# Patient Record
Sex: FEMALE | Race: WHITE | Employment: OTHER | URBAN - METROPOLITAN AREA
[De-identification: names, ages, dates, MRNs, and addresses within clinical notes are randomized per-mention and may not be internally consistent; named-entity substitution may affect disease eponyms.]

---

## 2017-03-29 ENCOUNTER — OP HISTORICAL/CONVERTED ENCOUNTER (OUTPATIENT)
Dept: OTHER | Age: 72
End: 2017-03-29

## 2017-03-30 ENCOUNTER — OP HISTORICAL/CONVERTED ENCOUNTER (OUTPATIENT)
Dept: OTHER | Age: 72
End: 2017-03-30

## 2017-04-12 ENCOUNTER — OP HISTORICAL/CONVERTED ENCOUNTER (OUTPATIENT)
Dept: OTHER | Age: 72
End: 2017-04-12

## 2017-05-02 ENCOUNTER — OP HISTORICAL/CONVERTED ENCOUNTER (OUTPATIENT)
Dept: OTHER | Age: 72
End: 2017-05-02

## 2017-05-12 ENCOUNTER — OP HISTORICAL/CONVERTED ENCOUNTER (OUTPATIENT)
Dept: OTHER | Age: 72
End: 2017-05-12

## 2017-06-05 ENCOUNTER — OP HISTORICAL/CONVERTED ENCOUNTER (OUTPATIENT)
Dept: OTHER | Age: 72
End: 2017-06-05

## 2017-06-06 ENCOUNTER — OP HISTORICAL/CONVERTED ENCOUNTER (OUTPATIENT)
Dept: OTHER | Age: 72
End: 2017-06-06

## 2017-08-24 ENCOUNTER — OP HISTORICAL/CONVERTED ENCOUNTER (OUTPATIENT)
Dept: OTHER | Age: 72
End: 2017-08-24

## 2019-07-11 ENCOUNTER — ED HISTORICAL/CONVERTED ENCOUNTER (OUTPATIENT)
Dept: OTHER | Age: 74
End: 2019-07-11

## 2019-10-15 ENCOUNTER — OP HISTORICAL/CONVERTED ENCOUNTER (OUTPATIENT)
Dept: OTHER | Age: 74
End: 2019-10-15

## 2020-07-17 ENCOUNTER — ED HISTORICAL/CONVERTED ENCOUNTER (OUTPATIENT)
Dept: OTHER | Age: 75
End: 2020-07-17

## 2021-05-09 ENCOUNTER — HOSPITAL ENCOUNTER (INPATIENT)
Age: 76
LOS: 3 days | Discharge: NURSING, REHAB OR LONG-TERM CARE | DRG: 871 | End: 2021-05-13
Attending: EMERGENCY MEDICINE | Admitting: FAMILY MEDICINE
Payer: MEDICARE

## 2021-05-09 ENCOUNTER — APPOINTMENT (OUTPATIENT)
Dept: GENERAL RADIOLOGY | Age: 76
DRG: 871 | End: 2021-05-09
Attending: EMERGENCY MEDICINE
Payer: MEDICARE

## 2021-05-09 ENCOUNTER — APPOINTMENT (OUTPATIENT)
Dept: CT IMAGING | Age: 76
DRG: 871 | End: 2021-05-09
Attending: EMERGENCY MEDICINE
Payer: MEDICARE

## 2021-05-09 DIAGNOSIS — S30.0XXA LUMBAR CONTUSION, INITIAL ENCOUNTER: ICD-10-CM

## 2021-05-09 DIAGNOSIS — S80.00XA CONTUSION OF KNEE, UNSPECIFIED LATERALITY, INITIAL ENCOUNTER: ICD-10-CM

## 2021-05-09 DIAGNOSIS — N39.0 URINARY TRACT INFECTION WITHOUT HEMATURIA, SITE UNSPECIFIED: ICD-10-CM

## 2021-05-09 DIAGNOSIS — R53.1 GENERALIZED WEAKNESS: ICD-10-CM

## 2021-05-09 DIAGNOSIS — A41.9 SEPTICEMIA (HCC): Primary | ICD-10-CM

## 2021-05-09 LAB
ALBUMIN SERPL-MCNC: 3.5 G/DL (ref 3.5–5)
ALBUMIN/GLOB SERPL: 1 {RATIO} (ref 1.1–2.2)
ALP SERPL-CCNC: 106 U/L (ref 45–117)
ALT SERPL-CCNC: 43 U/L (ref 12–78)
ANION GAP SERPL CALC-SCNC: 13 MMOL/L (ref 5–15)
APPEARANCE UR: CLEAR
AST SERPL W P-5'-P-CCNC: 52 U/L (ref 15–37)
BACTERIA URNS QL MICRO: ABNORMAL /HPF
BACTERIA URNS QL MICRO: ABNORMAL /HPF
BASOPHILS # BLD: 0 K/UL (ref 0–0.1)
BASOPHILS NFR BLD: 0 % (ref 0–1)
BILIRUB SERPL-MCNC: 1 MG/DL (ref 0.2–1)
BILIRUB UR QL: NEGATIVE
BUN SERPL-MCNC: 12 MG/DL (ref 6–20)
BUN/CREAT SERPL: 13 (ref 12–20)
CA-I BLD-MCNC: 9.6 MG/DL (ref 8.5–10.1)
CHLORIDE SERPL-SCNC: 112 MMOL/L (ref 97–108)
CK SERPL-CCNC: 1109 U/L (ref 26–192)
CO2 SERPL-SCNC: 17 MMOL/L (ref 21–32)
COLOR UR: ABNORMAL
CREAT SERPL-MCNC: 0.9 MG/DL (ref 0.55–1.02)
DIFFERENTIAL METHOD BLD: ABNORMAL
EOSINOPHIL # BLD: 0 K/UL (ref 0–0.4)
EOSINOPHIL NFR BLD: 0 % (ref 0–7)
ERYTHROCYTE [DISTWIDTH] IN BLOOD BY AUTOMATED COUNT: 13.2 % (ref 11.5–14.5)
GLOBULIN SER CALC-MCNC: 3.6 G/DL (ref 2–4)
GLUCOSE SERPL-MCNC: 114 MG/DL (ref 65–100)
GLUCOSE UR STRIP.AUTO-MCNC: NEGATIVE MG/DL
HCT VFR BLD AUTO: 38.9 % (ref 35–47)
HGB BLD-MCNC: 13.5 G/DL (ref 11.5–16)
HGB UR QL STRIP: ABNORMAL
IMM GRANULOCYTES # BLD AUTO: 0 K/UL (ref 0–0.04)
IMM GRANULOCYTES NFR BLD AUTO: 0 % (ref 0–0.5)
INR PPP: 1 (ref 0.9–1.1)
KETONES UR QL STRIP.AUTO: NEGATIVE MG/DL
LACTATE SERPL-SCNC: 5.6 MMOL/L (ref 0.4–2)
LEUKOCYTE ESTERASE UR QL STRIP.AUTO: NEGATIVE
LYMPHOCYTES # BLD: 0.9 K/UL (ref 0.8–3.5)
LYMPHOCYTES NFR BLD: 7 % (ref 12–49)
MAGNESIUM SERPL-MCNC: 1.4 MG/DL (ref 1.6–2.4)
MCH RBC QN AUTO: 32.2 PG (ref 26–34)
MCHC RBC AUTO-ENTMCNC: 34.7 G/DL (ref 30–36.5)
MCV RBC AUTO: 92.8 FL (ref 80–99)
MONOCYTES # BLD: 0.7 K/UL (ref 0–1)
MONOCYTES NFR BLD: 6 % (ref 5–13)
MUCOUS THREADS URNS QL MICRO: ABNORMAL /LPF
NEUTS SEG # BLD: 10.5 K/UL (ref 1.8–8)
NEUTS SEG NFR BLD: 87 % (ref 32–75)
NITRITE UR QL STRIP.AUTO: POSITIVE
NRBC # BLD: 0 K/UL (ref 0–0.01)
NRBC BLD-RTO: 0 PER 100 WBC
PH UR STRIP: 5 [PH] (ref 5–8)
PLATELET # BLD AUTO: 288 K/UL (ref 150–400)
PMV BLD AUTO: 9.7 FL (ref 8.9–12.9)
POTASSIUM SERPL-SCNC: 3.2 MMOL/L (ref 3.5–5.1)
PROT SERPL-MCNC: 7.1 G/DL (ref 6.4–8.2)
PROT UR STRIP-MCNC: NEGATIVE MG/DL
PROTHROMBIN TIME: 13.3 SEC (ref 11.9–14.7)
RBC # BLD AUTO: 4.19 M/UL (ref 3.8–5.2)
RBC #/AREA URNS HPF: ABNORMAL /HPF (ref 0–5)
RBC #/AREA URNS HPF: ABNORMAL /HPF (ref 0–5)
SODIUM SERPL-SCNC: 142 MMOL/L (ref 136–145)
SP GR UR REFRACTOMETRY: 1.02 (ref 1–1.03)
TROPONIN I SERPL-MCNC: <0.05 NG/ML
UROBILINOGEN UR QL STRIP.AUTO: 0.1 EU/DL (ref 0.1–1)
WBC # BLD AUTO: 12.2 K/UL (ref 3.6–11)
WBC URNS QL MICRO: ABNORMAL /HPF (ref 0–4)
WBC URNS QL MICRO: ABNORMAL /HPF (ref 0–4)

## 2021-05-09 PROCEDURE — 36415 COLL VENOUS BLD VENIPUNCTURE: CPT

## 2021-05-09 PROCEDURE — 72131 CT LUMBAR SPINE W/O DYE: CPT

## 2021-05-09 PROCEDURE — 81001 URINALYSIS AUTO W/SCOPE: CPT

## 2021-05-09 PROCEDURE — 74011000636 HC RX REV CODE- 636: Performed by: EMERGENCY MEDICINE

## 2021-05-09 PROCEDURE — 74011000258 HC RX REV CODE- 258: Performed by: EMERGENCY MEDICINE

## 2021-05-09 PROCEDURE — 82550 ASSAY OF CK (CPK): CPT

## 2021-05-09 PROCEDURE — 70450 CT HEAD/BRAIN W/O DYE: CPT

## 2021-05-09 PROCEDURE — 74011250637 HC RX REV CODE- 250/637: Performed by: EMERGENCY MEDICINE

## 2021-05-09 PROCEDURE — 96365 THER/PROPH/DIAG IV INF INIT: CPT

## 2021-05-09 PROCEDURE — 96361 HYDRATE IV INFUSION ADD-ON: CPT

## 2021-05-09 PROCEDURE — 85025 COMPLETE CBC W/AUTO DIFF WBC: CPT

## 2021-05-09 PROCEDURE — 80053 COMPREHEN METABOLIC PANEL: CPT

## 2021-05-09 PROCEDURE — 85610 PROTHROMBIN TIME: CPT

## 2021-05-09 PROCEDURE — 73562 X-RAY EXAM OF KNEE 3: CPT

## 2021-05-09 PROCEDURE — 93005 ELECTROCARDIOGRAM TRACING: CPT

## 2021-05-09 PROCEDURE — 84484 ASSAY OF TROPONIN QUANT: CPT

## 2021-05-09 PROCEDURE — 94760 N-INVAS EAR/PLS OXIMETRY 1: CPT

## 2021-05-09 PROCEDURE — 74011250636 HC RX REV CODE- 250/636: Performed by: EMERGENCY MEDICINE

## 2021-05-09 PROCEDURE — 51702 INSERT TEMP BLADDER CATH: CPT

## 2021-05-09 PROCEDURE — 70498 CT ANGIOGRAPHY NECK: CPT

## 2021-05-09 PROCEDURE — 83605 ASSAY OF LACTIC ACID: CPT

## 2021-05-09 PROCEDURE — 87040 BLOOD CULTURE FOR BACTERIA: CPT

## 2021-05-09 PROCEDURE — 71045 X-RAY EXAM CHEST 1 VIEW: CPT

## 2021-05-09 PROCEDURE — 83735 ASSAY OF MAGNESIUM: CPT

## 2021-05-09 PROCEDURE — 99285 EMERGENCY DEPT VISIT HI MDM: CPT

## 2021-05-09 RX ORDER — ASPIRIN 325 MG
325 TABLET ORAL
Status: COMPLETED | OUTPATIENT
Start: 2021-05-09 | End: 2021-05-09

## 2021-05-09 RX ADMIN — SODIUM CHLORIDE 1000 ML: 9 INJECTION, SOLUTION INTRAVENOUS at 22:11

## 2021-05-09 RX ADMIN — PIPERACILLIN AND TAZOBACTAM 3.38 G: 3; .375 INJECTION, POWDER, LYOPHILIZED, FOR SOLUTION INTRAVENOUS at 23:06

## 2021-05-09 RX ADMIN — ASPIRIN 325 MG ORAL TABLET 325 MG: 325 PILL ORAL at 22:58

## 2021-05-09 RX ADMIN — IOPAMIDOL 100 ML: 755 INJECTION, SOLUTION INTRAVENOUS at 22:42

## 2021-05-10 PROBLEM — N39.0 UTI (URINARY TRACT INFECTION): Status: ACTIVE | Noted: 2021-05-10

## 2021-05-10 LAB
ANION GAP SERPL CALC-SCNC: 7 MMOL/L (ref 5–15)
ATRIAL RATE: 394 BPM
BUN SERPL-MCNC: 9 MG/DL (ref 6–20)
BUN/CREAT SERPL: 12 (ref 12–20)
CA-I BLD-MCNC: 8.9 MG/DL (ref 8.5–10.1)
CALCULATED P AXIS, ECG09: 77 DEGREES
CALCULATED R AXIS, ECG10: -66 DEGREES
CALCULATED T AXIS, ECG11: -68 DEGREES
CHLORIDE SERPL-SCNC: 118 MMOL/L (ref 97–108)
CO2 SERPL-SCNC: 19 MMOL/L (ref 21–32)
CREAT SERPL-MCNC: 0.77 MG/DL (ref 0.55–1.02)
DIAGNOSIS, 93000: NORMAL
ERYTHROCYTE [DISTWIDTH] IN BLOOD BY AUTOMATED COUNT: 13.7 % (ref 11.5–14.5)
GLUCOSE SERPL-MCNC: 87 MG/DL (ref 65–100)
HCT VFR BLD AUTO: 36.3 % (ref 35–47)
HGB BLD-MCNC: 12.2 G/DL (ref 11.5–16)
LACTATE SERPL-SCNC: 2.2 MMOL/L (ref 0.4–2)
MAGNESIUM SERPL-MCNC: 1.8 MG/DL (ref 1.6–2.4)
MCH RBC QN AUTO: 31.9 PG (ref 26–34)
MCHC RBC AUTO-ENTMCNC: 33.6 G/DL (ref 30–36.5)
MCV RBC AUTO: 95 FL (ref 80–99)
NRBC # BLD: 0 K/UL (ref 0–0.01)
NRBC BLD-RTO: 0 PER 100 WBC
P-R INTERVAL, ECG05: 138 MS
PLATELET # BLD AUTO: 210 K/UL (ref 150–400)
PMV BLD AUTO: 10 FL (ref 8.9–12.9)
POTASSIUM SERPL-SCNC: 4 MMOL/L (ref 3.5–5.1)
Q-T INTERVAL, ECG07: 336 MS
QRS DURATION, ECG06: 68 MS
QTC CALCULATION (BEZET), ECG08: 458 MS
RBC # BLD AUTO: 3.82 M/UL (ref 3.8–5.2)
SODIUM SERPL-SCNC: 144 MMOL/L (ref 136–145)
TSH SERPL DL<=0.05 MIU/L-ACNC: 0.08 UIU/ML (ref 0.36–3.74)
VENTRICULAR RATE, ECG03: 112 BPM
WBC # BLD AUTO: 8 K/UL (ref 3.6–11)

## 2021-05-10 PROCEDURE — 83605 ASSAY OF LACTIC ACID: CPT

## 2021-05-10 PROCEDURE — 87186 SC STD MICRODIL/AGAR DIL: CPT

## 2021-05-10 PROCEDURE — 96375 TX/PRO/DX INJ NEW DRUG ADDON: CPT

## 2021-05-10 PROCEDURE — 97530 THERAPEUTIC ACTIVITIES: CPT

## 2021-05-10 PROCEDURE — 74011250637 HC RX REV CODE- 250/637: Performed by: FAMILY MEDICINE

## 2021-05-10 PROCEDURE — 80061 LIPID PANEL: CPT

## 2021-05-10 PROCEDURE — 74011250636 HC RX REV CODE- 250/636: Performed by: FAMILY MEDICINE

## 2021-05-10 PROCEDURE — 80048 BASIC METABOLIC PNL TOTAL CA: CPT

## 2021-05-10 PROCEDURE — 85027 COMPLETE CBC AUTOMATED: CPT

## 2021-05-10 PROCEDURE — 96366 THER/PROPH/DIAG IV INF ADDON: CPT

## 2021-05-10 PROCEDURE — 83735 ASSAY OF MAGNESIUM: CPT

## 2021-05-10 PROCEDURE — 97161 PT EVAL LOW COMPLEX 20 MIN: CPT

## 2021-05-10 PROCEDURE — 74011250637 HC RX REV CODE- 250/637: Performed by: PHYSICIAN ASSISTANT

## 2021-05-10 PROCEDURE — 65270000029 HC RM PRIVATE

## 2021-05-10 PROCEDURE — 84443 ASSAY THYROID STIM HORMONE: CPT

## 2021-05-10 PROCEDURE — 87086 URINE CULTURE/COLONY COUNT: CPT

## 2021-05-10 PROCEDURE — 74011250636 HC RX REV CODE- 250/636: Performed by: EMERGENCY MEDICINE

## 2021-05-10 PROCEDURE — 87077 CULTURE AEROBIC IDENTIFY: CPT

## 2021-05-10 RX ORDER — PANTOPRAZOLE SODIUM 40 MG/1
40 TABLET, DELAYED RELEASE ORAL
Status: DISCONTINUED | OUTPATIENT
Start: 2021-05-10 | End: 2021-05-13 | Stop reason: HOSPADM

## 2021-05-10 RX ORDER — MAGNESIUM SULFATE 1 G/100ML
1 INJECTION INTRAVENOUS
Status: COMPLETED | OUTPATIENT
Start: 2021-05-10 | End: 2021-05-10

## 2021-05-10 RX ORDER — TROSPIUM CHLORIDE 20 MG/1
20 TABLET, FILM COATED ORAL 2 TIMES DAILY
Status: DISCONTINUED | OUTPATIENT
Start: 2021-05-10 | End: 2021-05-13 | Stop reason: HOSPADM

## 2021-05-10 RX ORDER — PROMETHAZINE HYDROCHLORIDE 25 MG/1
12.5 TABLET ORAL
Status: DISCONTINUED | OUTPATIENT
Start: 2021-05-10 | End: 2021-05-13 | Stop reason: HOSPADM

## 2021-05-10 RX ORDER — ATORVASTATIN CALCIUM 40 MG/1
40 TABLET, FILM COATED ORAL
Status: DISCONTINUED | OUTPATIENT
Start: 2021-05-10 | End: 2021-05-13 | Stop reason: HOSPADM

## 2021-05-10 RX ORDER — LEVOTHYROXINE SODIUM 75 UG/1
75 TABLET ORAL DAILY
COMMUNITY
Start: 2021-04-14 | End: 2021-05-13

## 2021-05-10 RX ORDER — POLYETHYLENE GLYCOL 3350 17 G/17G
17 POWDER, FOR SOLUTION ORAL DAILY PRN
Status: DISCONTINUED | OUTPATIENT
Start: 2021-05-10 | End: 2021-05-13 | Stop reason: HOSPADM

## 2021-05-10 RX ORDER — SODIUM CHLORIDE 9 MG/ML
75 INJECTION, SOLUTION INTRAVENOUS CONTINUOUS
Status: DISPENSED | OUTPATIENT
Start: 2021-05-10 | End: 2021-05-10

## 2021-05-10 RX ORDER — ATORVASTATIN CALCIUM 40 MG/1
40 TABLET, FILM COATED ORAL
COMMUNITY
Start: 2021-05-03

## 2021-05-10 RX ORDER — TOLTERODINE TARTRATE 2 MG/1
2 CAPSULE, EXTENDED RELEASE ORAL DAILY
COMMUNITY
Start: 2021-04-14 | End: 2022-06-02

## 2021-05-10 RX ORDER — LOPERAMIDE HYDROCHLORIDE 2 MG/1
4 CAPSULE ORAL
Status: DISCONTINUED | OUTPATIENT
Start: 2021-05-10 | End: 2021-05-13 | Stop reason: HOSPADM

## 2021-05-10 RX ORDER — ACETAMINOPHEN 325 MG/1
650 TABLET ORAL
Status: DISCONTINUED | OUTPATIENT
Start: 2021-05-10 | End: 2021-05-13 | Stop reason: HOSPADM

## 2021-05-10 RX ORDER — POTASSIUM CHLORIDE 20 MEQ/1
20 TABLET, EXTENDED RELEASE ORAL 2 TIMES DAILY
Status: DISCONTINUED | OUTPATIENT
Start: 2021-05-10 | End: 2021-05-10

## 2021-05-10 RX ORDER — PROPRANOLOL HYDROCHLORIDE 20 MG/1
10 TABLET ORAL 2 TIMES DAILY
Status: DISCONTINUED | OUTPATIENT
Start: 2021-05-10 | End: 2021-05-13 | Stop reason: HOSPADM

## 2021-05-10 RX ORDER — PROPRANOLOL HYDROCHLORIDE 10 MG/1
20 TABLET ORAL 2 TIMES DAILY
COMMUNITY
Start: 2021-02-26 | End: 2022-06-06

## 2021-05-10 RX ORDER — ONDANSETRON 2 MG/ML
4 INJECTION INTRAMUSCULAR; INTRAVENOUS
Status: DISCONTINUED | OUTPATIENT
Start: 2021-05-10 | End: 2021-05-13 | Stop reason: HOSPADM

## 2021-05-10 RX ORDER — ACETAMINOPHEN 650 MG/1
650 SUPPOSITORY RECTAL
Status: DISCONTINUED | OUTPATIENT
Start: 2021-05-10 | End: 2021-05-13 | Stop reason: HOSPADM

## 2021-05-10 RX ORDER — POTASSIUM CHLORIDE 750 MG/1
40 TABLET, FILM COATED, EXTENDED RELEASE ORAL
Status: COMPLETED | OUTPATIENT
Start: 2021-05-10 | End: 2021-05-10

## 2021-05-10 RX ORDER — LEVOTHYROXINE SODIUM 75 UG/1
75 TABLET ORAL DAILY
Status: DISCONTINUED | OUTPATIENT
Start: 2021-05-10 | End: 2021-05-11

## 2021-05-10 RX ADMIN — TROSPIUM CHLORIDE 20 MG: 20 TABLET, FILM COATED ORAL at 12:39

## 2021-05-10 RX ADMIN — ACETAMINOPHEN 650 MG: 325 TABLET, FILM COATED ORAL at 16:22

## 2021-05-10 RX ADMIN — LEVOTHYROXINE SODIUM 75 MCG: 75 TABLET ORAL at 09:03

## 2021-05-10 RX ADMIN — ATORVASTATIN CALCIUM 40 MG: 40 TABLET, FILM COATED ORAL at 21:55

## 2021-05-10 RX ADMIN — PANTOPRAZOLE SODIUM 40 MG: 40 TABLET, DELAYED RELEASE ORAL at 09:03

## 2021-05-10 RX ADMIN — PROPRANOLOL HYDROCHLORIDE 10 MG: 20 TABLET ORAL at 09:03

## 2021-05-10 RX ADMIN — TROSPIUM CHLORIDE 20 MG: 20 TABLET, FILM COATED ORAL at 21:55

## 2021-05-10 RX ADMIN — POTASSIUM CHLORIDE 40 MEQ: 750 TABLET, FILM COATED, EXTENDED RELEASE ORAL at 00:39

## 2021-05-10 RX ADMIN — SODIUM CHLORIDE 75 ML/HR: 9 INJECTION, SOLUTION INTRAVENOUS at 00:37

## 2021-05-10 RX ADMIN — LOPERAMIDE HYDROCHLORIDE 4 MG: 2 CAPSULE ORAL at 16:22

## 2021-05-10 RX ADMIN — PROPRANOLOL HYDROCHLORIDE 10 MG: 20 TABLET ORAL at 21:55

## 2021-05-10 RX ADMIN — MAGNESIUM SULFATE HEPTAHYDRATE 1 G: 1 INJECTION, SOLUTION INTRAVENOUS at 00:03

## 2021-05-10 NOTE — ED PROVIDER NOTES
EMERGENCY DEPARTMENT HISTORY AND PHYSICAL EXAM      Date: 5/9/2021  Patient Name: Marge Conway      History of Presenting Illness     Chief Complaint   Patient presents with   [facility] Fall    Altered mental status       History Provided By: Patient and EMS    HPI: Marge Conway, 77 y.o. female with a past medical history significant Unknown at this time presents to the ED with cc of fall, possible stroke. This morning neighbor found patient on the floor. Patient refused to come to emergency department. Later on today patient was found by cousin on the floor and appears to be more confused and had slightly slurred speech. Cousin called ambulance ambulance brought the patient to emergency department. Upon arrival patient has a slight confusion. She complains of generalized weakness. She complains of bilateral knee pain. She complains of lower back pain. Patient denies chest pain or shortness of breath. Patient denies any other complaints at this time. There are no other complaints, changes, or physical findings at this time. PCP: No primary care provider on file. Past History     Past Medical History:  Past Medical History:   Diagnosis Date    HTN (hypertension)     Hypercholesterolemia     Hypothyroidism     Urinary incontinence        Past Surgical History:  History reviewed. No pertinent surgical history. Family History:  History reviewed. No pertinent family history. Social History:  Social History     Tobacco Use    Smoking status: Never Smoker    Smokeless tobacco: Never Used   Substance Use Topics    Alcohol use: Not Currently    Drug use: Not Currently       Allergies: Allergies   Allergen Reactions    Codeine Unknown (comments)         Review of Systems     Review of Systems   Constitutional: Positive for chills and fatigue. HENT: Negative. Eyes: Negative. Respiratory: Negative. Cardiovascular: Negative. Gastrointestinal: Negative.     Genitourinary: Negative. Musculoskeletal: Negative. Skin: Negative. Neurological: Positive for weakness. Psychiatric/Behavioral: Negative. All other systems reviewed and are negative. Physical Exam     Physical Exam  Vitals signs and nursing note reviewed. Constitutional:       General: She is not in acute distress. Appearance: Normal appearance. She is normal weight. She is not ill-appearing or diaphoretic. HENT:      Head: Normocephalic. Right Ear: External ear normal.      Left Ear: External ear normal.      Nose: Nose normal. No congestion or rhinorrhea. Mouth/Throat:      Pharynx: Oropharynx is clear. No oropharyngeal exudate or posterior oropharyngeal erythema. Eyes:      General: No scleral icterus. Right eye: No discharge. Left eye: No discharge. Extraocular Movements: Extraocular movements intact. Conjunctiva/sclera: Conjunctivae normal.      Pupils: Pupils are equal, round, and reactive to light. Neck:      Musculoskeletal: Normal range of motion and neck supple. No neck rigidity or muscular tenderness. Cardiovascular:      Rate and Rhythm: Normal rate and regular rhythm. Pulses: Normal pulses. Heart sounds: Normal heart sounds. No murmur. Pulmonary:      Effort: Pulmonary effort is normal. No respiratory distress. Breath sounds: Normal breath sounds. No stridor. No wheezing, rhonchi or rales. Chest:      Chest wall: No tenderness. Abdominal:      General: Abdomen is flat. Bowel sounds are normal. There is no distension. Palpations: Abdomen is soft. Tenderness: There is no abdominal tenderness. There is no right CVA tenderness, left CVA tenderness, guarding or rebound. Musculoskeletal:         General: Tenderness present. No swelling, deformity or signs of injury. Right lower leg: No edema. Left lower leg: No edema.       Comments: Positive tenderness and mild erythema noted on the anterior aspects of the both knees. Decreased range of motion of both knees due to the pain. Neurovascular intact. Positive tenderness and decreased range of motion of lower lumbar area. Neurovascular intact. No signs of erythema, cellulitis or abscess. Skin:     General: Skin is warm. Capillary Refill: Capillary refill takes less than 2 seconds. Coloration: Skin is not jaundiced or pale. Findings: No bruising, erythema, lesion or rash. Neurological:      General: No focal deficit present. Mental Status: She is alert. Mental status is at baseline. GCS: GCS eye subscore is 4. GCS verbal subscore is 5. GCS motor subscore is 6. Cranial Nerves: No cranial nerve deficit. Sensory: No sensory deficit. Motor: No weakness. Coordination: Coordination normal.      Comments: Positive generalized weakness. Positive mild confusion. Questionable slurred speech. Patient follows command. Patient moves all her extremities. However she says she is kind of weak in the lower extremities. Psychiatric:         Mood and Affect: Mood normal.         Behavior: Behavior normal.         Thought Content:  Thought content normal.         Judgment: Judgment normal.         Lab and Diagnostic Study Results     Labs -     Recent Results (from the past 12 hour(s))   URINALYSIS W/ RFLX MICROSCOPIC    Collection Time: 05/09/21  9:50 PM   Result Value Ref Range    Color Yellow/Straw      Appearance Clear Clear      Specific gravity 1.023 1.003 - 1.030      pH (UA) 5.0 5.0 - 8.0      Protein Negative Negative mg/dL    Glucose Negative Negative mg/dL    Ketone Negative Negative mg/dL    Bilirubin Negative Negative      Blood Moderate (A) Negative      Urobilinogen 0.1 0.1 - 1.0 EU/dL    Nitrites Positive (A) Negative      Leukocyte Esterase Negative Negative      WBC 5-10 0 - 4 /hpf    RBC 0-5 0 - 5 /hpf    Bacteria 3+ (A) Negative /hpf    Mucus Trace /lpf   URINE MICROSCOPIC    Collection Time: 05/09/21  9:50 PM Result Value Ref Range    WBC 5-10 0 - 4 /hpf    RBC 0-5 0 - 5 /hpf    Bacteria 3+ (A) Negative /hpf   CBC WITH AUTOMATED DIFF    Collection Time: 05/09/21 10:05 PM   Result Value Ref Range    WBC 12.2 (H) 3.6 - 11.0 K/uL    RBC 4.19 3.80 - 5.20 M/uL    HGB 13.5 11.5 - 16.0 g/dL    HCT 38.9 35.0 - 47.0 %    MCV 92.8 80.0 - 99.0 FL    MCH 32.2 26.0 - 34.0 PG    MCHC 34.7 30.0 - 36.5 g/dL    RDW 13.2 11.5 - 14.5 %    PLATELET 334 834 - 705 K/uL    MPV 9.7 8.9 - 12.9 FL    NRBC 0.0 0.0  WBC    ABSOLUTE NRBC 0.00 0.00 - 0.01 K/uL    NEUTROPHILS 87 (H) 32 - 75 %    LYMPHOCYTES 7 (L) 12 - 49 %    MONOCYTES 6 5 - 13 %    EOSINOPHILS 0 0 - 7 %    BASOPHILS 0 0 - 1 %    IMMATURE GRANULOCYTES 0 0 - 0.5 %    ABS. NEUTROPHILS 10.5 (H) 1.8 - 8.0 K/UL    ABS. LYMPHOCYTES 0.9 0.8 - 3.5 K/UL    ABS. MONOCYTES 0.7 0.0 - 1.0 K/UL    ABS. EOSINOPHILS 0.0 0.0 - 0.4 K/UL    ABS. BASOPHILS 0.0 0.0 - 0.1 K/UL    ABS. IMM. GRANS. 0.0 0.00 - 0.04 K/UL    DF AUTOMATED     PROTHROMBIN TIME + INR    Collection Time: 05/09/21 10:05 PM   Result Value Ref Range    Prothrombin time 13.3 11.9 - 14.7 sec    INR 1.0 0.9 - 1.1     METABOLIC PANEL, COMPREHENSIVE    Collection Time: 05/09/21 10:05 PM   Result Value Ref Range    Sodium 142 136 - 145 mmol/L    Potassium 3.2 (L) 3.5 - 5.1 mmol/L    Chloride 112 (H) 97 - 108 mmol/L    CO2 17 (L) 21 - 32 mmol/L    Anion gap 13 5 - 15 mmol/L    Glucose 114 (H) 65 - 100 mg/dL    BUN 12 6 - 20 mg/dL    Creatinine 0.90 0.55 - 1.02 mg/dL    BUN/Creatinine ratio 13 12 - 20      GFR est AA >60 >60 ml/min/1.73m2    GFR est non-AA >60 >60 ml/min/1.73m2    Calcium 9.6 8.5 - 10.1 mg/dL    Bilirubin, total 1.0 0.2 - 1.0 mg/dL    AST (SGOT) 52 (H) 15 - 37 U/L    ALT (SGPT) 43 12 - 78 U/L    Alk.  phosphatase 106 45 - 117 U/L    Protein, total 7.1 6.4 - 8.2 g/dL    Albumin 3.5 3.5 - 5.0 g/dL    Globulin 3.6 2.0 - 4.0 g/dL    A-G Ratio 1.0 (L) 1.1 - 2.2     TROPONIN I    Collection Time: 05/09/21 10:05 PM   Result Value Ref Range    Troponin-I, Qt. <0.05 <0.05 ng/mL   MAGNESIUM    Collection Time: 05/09/21 10:05 PM   Result Value Ref Range    Magnesium 1.4 (L) 1.6 - 2.4 mg/dL   LACTIC ACID    Collection Time: 05/09/21 10:05 PM   Result Value Ref Range    Lactic acid 5.6 (HH) 0.4 - 2.0 mmol/L   CK    Collection Time: 05/09/21 10:05 PM   Result Value Ref Range    CK 1,109 (H) 26 - 192 U/L       Radiologic Studies -   [unfilled]  CT Results  (Last 48 hours)               05/09/21 2241  CT SPINE LUMB WO CONT Final result    Impression:  Mild multilevel degenerative disc changes, chronic superior endplate   depressions of T12 and L4, but no acute findings related to the patient's   current trauma. Narrative:  HISTORY:  fall. pain   Dose reduction technique: All CT scans at this facility are performed using dose reduction optimization   technique as appropriate on the exam including the following: Automated exposure   control, adjustment of the MA and/or KV according to patient size of use of   iterative reconstructive technique. .       TECHNIQUE: Noncontrast CT of the lumbar spine with multiplanar reconstructions. COMPARISON: 7/11/2019 abdominopelvic CT   LIMITATIONS: None       FRACTURES: No acute fractures. Superior endplate depression at R23 and L4 are   unchanged from the patient's prior exam.   ALIGNMENT: Normal   VERTEBRAL BODIES: Some mild anterior osteophyte formation. Superior endplate   depressions at T12 and L4 again noted. DISC SPACES: Mild posterior disc bulge at L2-3. Posterior disc/osteophyte   complex at L3-4 and with mild bilateral neural foraminal narrowing. POSTERIOR ELEMENTS: Mild multilevel facet arthropathic changes. SPINAL CANAL: Normal   SACROILIAC JOINT: Visualized portions unremarkable   PARASPINAL SOFT TISSUES: Normal       OTHER: None           05/09/21 2241  CTA CODE NEURO HEAD AND NECK W CONT Final result    Impression:  Normal CT angiogram of the neck.             HISTORY: weakness   Dose reduction technique: All CT scans at this facility are performed using dose reduction optimization   technique as appropriate on the exam including the following: Automated exposure   control, adjustment of the MA and/or KV according to patient size and/or use of   iterative reconstructive technique. TECHNIQUE: CTA CODE NEURO HEAD W CONTCT angiogram of the brain was performed and   maximum intensity projection images (MIPS) were generated. 100 cc Isovue-370 injected. All stenosis measurements performed using NASCET   criteria. COMPARISON: Brain CT performed as previous to this examination   LIMITATIONS: None           Noncontrast brain CT demonstrates no evidence of acute intracranial hemorrhage,   mass effect, or midline shift. No acute extra-axial abnormalities. Ventricles   are patent and clear. Postcontrast brain CT angiogram findings:   CAROTID ARTERIES: Normal   ANTERIOR CEREBRAL ARTERIES: Normal   MIDDLE CEREBRAL ARTERIES: Normal   POSTERIOR CEREBRAL ARTERIES: Normal   BASILAR ARTERY: Normal   VERTEBRAL ARTERIES: Normal   VENOUS STRUCTURES: Visualized venous structures unremarkable. OTHER: None       IMPRESSION: Normal CT angiogram of the brain. Narrative:  HISTORY:  weakness   Dose reduction technique: All CT scans at this facility are performed using dose reduction optimization   technique as appropriate on the exam including the following: Automated exposure   control, adjustment of the MA and/or KV according to patient size of use of   iterative reconstructive technique. .       TECHNIQUE: CTA CODE NEURO NECK W CONT   CT angiogram of the neck performed and   maximum intensity projection images (MIPS) generated. 100 cc Isovue-370 injected.                              All stenosis measurements performed using NASCET   criteria   COMPARISON: None   LIMITATIONS: None       AORTIC ARCH: Visualized aortic arch normal   CAROTID ARTERIES: Normal   VERTEBRAL ARTERIES: Normal       OTHER ARTERIES: Other visualized arteries appear unremarkable   VENOUS STRUCTURES: Visualized venous structures appear unremarkable   OSSEOUS STRUCTURES and SOFT TISSUES: Normal   OTHER: None           05/09/21 2147  CT CODE NEURO HEAD WO CONTRAST Final result    Impression:      1. No acute intracranial abnormality. 2. Atrophy with periventricular white matter hypoattenuation, nonspecific, but   most commonly seen in the setting of chronic small vessel ischemic disease. I called these results to Dr. Laila Meyers at 10:00 PM on 5/9/2021       Narrative:  EXAMINATION: Computed tomography of the head without contrast.       HISTORY: Weakness       TECHNIQUE: Routine axial images were obtained through the brain without the use   of IV contrast. Sagittal and coronal reformatted images were performed at the CT   console. Dose Reduction: All CT scans at this facility are performed using dose reduction   optimization techniques as appropriate to a performed exam including the   following: Automated exposure control, adjustments of the mA and/or kV according   to patient size, or use of iterative reconstruction technique. COMPARISON: CT angiography of the head dated 8/12/2011       FINDINGS:    There is no acute intracranial hemorrhage. There is moderate periventricular and   deep white matter hypoattenuation. No definite CT evidence of acute vascular   territorial distribution infarction. There is mild generalized volume loss. Ventricles are normal in size and   morphology. There is no mass effect or midline shift. The basilar cisterns are   patent. Native lenses are absent. There are degenerative changes of the   temporomandibular joints. There is mild mucosal thickening of the maxillary sinuses. Mastoid air cells are   clear. There is no acute calvarial fracture.                CXR Results  (Last 48 hours)               05/09/21 2253  XR CHEST PORT Final result    Impression:  No acute cardiopulmonary abnormality. Narrative:  EXAMINATION: XR CHEST PORT       HISTORY: Fall       COMPARISON: 11/21/2016       FINDINGS: Single view. The lungs are clear. There is no pneumothorax or pleural   effusion. Heart size and mediastinal contours are unchanged. The thoracic aorta   is atherosclerotic. Small, incompletely evaluated focus of sclerosis in the left   proximal humerus. Medical Decision Making and ED Course   - I am the first and primary provider for this patient AND AM THE PRIMARY PROVIDER OF RECORD. - I reviewed the vital signs, available nursing notes, past medical history, past surgical history, family history and social history. - Initial assessment performed. The patients presenting problems have been discussed, and the staff are in agreement with the care plan formulated and outlined with them. I have encouraged them to ask questions as they arise throughout their visit. Vital Signs-Reviewed the patient's vital signs. Patient Vitals for the past 12 hrs:   Temp Pulse Resp BP SpO2   05/09/21 2309  96 24 134/80 100 %   05/09/21 2200 97.6 °F (36.4 °C) 76 17 (!) 170/65 98 %       EKG interpretation: (Preliminary): EKG was done at 9:52 PM.  Sinus tachycardia. Rate of 112. Left axis deviation. No acute ST-T elevation. No STEMI. No old EKG available for comparison at this point. }.       Records Reviewed: Nursing Notes        ED Course:              Provider Notes (Medical Decision Making):     MDM  Number of Diagnoses or Management Options  Contusion of knee, unspecified laterality, initial encounter: new, needed workup  Generalized weakness: new, needed workup  Lumbar contusion, initial encounter: new, needed workup  Septicemia [facility]): new, needed workup  Urinary tract infection without hematuria, site unspecified: new, needed workup  Diagnosis management comments: Differential diagnosis includes CVA, TIA, fall, multiple injury, cardiac event, CNS event, sepsis, UTI, pneumonia. Amount and/or Complexity of Data Reviewed  Clinical lab tests: ordered and reviewed  Tests in the radiology section of CPT®: ordered and reviewed  Tests in the medicine section of CPT®: reviewed and ordered  Discuss the patient with other providers: yes (Case discussed with Dr. Katy Montano. We will admit the patient to her service.)  Independent visualization of images, tracings, or specimens: yes (EKG was done at 9:52 PM.  Sinus tachycardia. Rate of 112. Left axis deviation. No acute ST-T elevation. No STEMI. No old EKG available for comparison at this point.)    Risk of Complications, Morbidity, and/or Mortality  Presenting problems: high  Diagnostic procedures: high  Management options: high  General comments: Patient remained stable throughout the course of treatment. CT of head, CTA of head, CT of neck were all negative for any acute pathology. Patient has a leukocytosis and lactic acidosis. UA was positive for UTI. Patient was made to order blood culture. IV fluid was given. IV antibiotic was given. Will admit patient to hospital.  Case discussed with admitting physician. Will admit to her service.                Consultations:       Consultations:         Procedures and Critical Care       Performed by: Annette Castro DO  PROCEDURES:  Procedures               CRITICAL CARE NOTE :  9:36 PM  Amount of Critical Care Time: 75(minutes)    IMPENDING DETERIORATION -Metabolic and CNS and sepsis  ASSOCIATED RISK FACTORS - Metabolic changes and Dehydration  MANAGEMENT- Bedside Assessment and Supervision of Care  INTERPRETATION -  Xrays, CT Scan, ECG and Blood Pressure  INTERVENTIONS - hemodynamic mngmt and Neurologic interventions   CASE REVIEW - Hospitalist/Intensivist  TREATMENT RESPONSE -Stable  PERFORMED BY - Self    NOTES   :  I have spent critical care time involved in lab review, consultations with specialist, family decision- making, bedside attention and documentation. This time excludes time spent in any separate billed procedures. During this entire length of time I was immediately available to the patient . Guerrero Haas DO        Disposition     Disposition: Condition stable    Admitted      Diagnosis     Clinical Impression:   1. Septicemia ([facility] [address]    2. Urinary tract infection without hematuria, site unspecified    3. Lumbar contusion, initial encounter    4. Contusion of knee, unspecified laterality, initial encounter    5. Generalized weakness        Attestations:    Guerrero Haas DO    Please note that this dictation was completed with [employer], the computer voice recognition software. Quite often unanticipated grammatical, syntax, homophones, and other interpretive errors are inadvertently transcribed by the computer software. Please disregard these errors. Please excuse any errors that have escaped final proofreading. Thank you.

## 2021-05-10 NOTE — PROGRESS NOTES
PHYSICAL THERAPY EVALUATION  Patient: Willie Esteves (67 y.o. female)  Date: 5/10/2021  Primary Diagnosis: UTI (urinary tract infection) [N39.0]        Precautions: falls    ASSESSMENT  This is a 76 y/o female  with past medical history of essential tremors, hyperlipidemia, hypothyroidism and urinary incontinence presenting to the ER from home for evaluation of confusion and multiple recent falls . On 5/8/21 , emergency medical services were called after neighbor found patient on the floor of her home. Initially patient refused to come to the ER. On 5/8/21  evening, cousin found her on the floor with some confusion. She has had some increased fatigue and weakness over the past several days. Pt admitted on 5/9/21 for UTI. CTA of the head reports normal CT angiogram of the brain. CT of the C-spine and   X-ray of the left and right knee have no evidence of acute fractures. Pt. Is A& O x 3 , disoriented to time ,   as per pt report , she lives  alone in a singe story home with 5 steps to enter , HR on bilateral sides ,  IND with ADL's and mod I for functional transfers/mobility with rollator prior to admission . Based on the objective data described below, the patient presents with confusion , [place], decreased strength , 3+/5 grossly in  b/l LE ,  balance deficits , generalized weakness , decreased safety awareness , decreased activity tolerance and increased need for  assist with functional mobility ( needs SBA for rolling from side to side  , Calista for supine >sit transfers , intact static sitting balance with support, Calista for sit <>stand  , poor static  standing balance with constant support sec to body tremors , is able to take few side steps towards the [facility]- 3' with Rw, modA  with narrow TATO , and decreased foot clearance b/l Le ) . Pt would benefit from skilled PT services while at [facility] in order to increase safety and independence with functional transfers/mobility. Recommend d/c to SNF when medically appropriate . Current Level of Function Impacting Discharge (mobility/balance): Requires min/modA for functional mobility . Functional Outcome Measure: The patient scored 14 on the AM PAC basic mobility  outcome measure which is indicative of medium complexity . Other factors to consider for discharge: PLOF , lives alone , severity of deficits          PLAN :  Recommendations and Planned Interventions: bed mobility training, transfer training, gait training, therapeutic exercises, neuromuscular re-education, patient and family training/education, and therapeutic activities      Frequency/Duration: Patient will be followed by physical therapy:  5 times a week to address goals. Recommendation for discharge: (in order for the patient to meet his/her long term goals)  SNF    This discharge recommendation:  Has been made in collaboration with the attending provider and/or case management    IF patient discharges home will need the following DME: wheelchair         SUBJECTIVE:   Patient stated I fell at home     OBJECTIVE DATA SUMMARY:   HISTORY:    Past Medical History:   Diagnosis Date    Essential tremor     Hypercholesterolemia     Hypothyroidism     Urinary incontinence    History reviewed. No pertinent surgical history. Personal factors and/or comorbidities impacting plan of care:     Home Situation  Home Environment: Private residence  # Steps to Enter: 5  Rails to Enter: Yes  Hand Rails : Bilateral  One/Two Story Residence: One story  Living Alone: Yes  Current DME Used/Available at Home: Walker, rollator, Shower chair    PLOF: Pt IND for ADLS/IADLS, mod I with mobility with rollator prior to admission.      EXAMINATION/PRESENTATION/DECISION MAKING:   Critical Behavior:  Neurologic State: Alert, Confused  Orientation Level: Oriented to person, Oriented to place, Disoriented to situation, Disoriented to time  Cognition: Appropriate safety awareness, Follows commands, Memory loss, Recognition of people/places     Hearing: Auditory  Auditory Impairment: Hard of hearing, bilateral  Skin:  intact where exposed     Range Of Motion:  AROM: Within functional limits    Strength:    Strength: Generally decreased, functional    Tone & Sensation:   Tone: Normal    Sensation: Intact    Coordination:  Coordination: Generally decreased, functional(3+/5 grossly)    Functional Mobility:  Bed Mobility:  Rolling: Stand-by assistance  Supine to Sit: Minimum assistance  Sit to Supine: Maximum assistance  Scooting: Moderate assistance  Transfers:  Sit to Stand: Minimum assistance  Stand to Sit: Minimum assistance     Balance:   Sitting: Intact; With support  Standing: Impaired; With support  Standing - Static: Poor;Constant support  Standing - Dynamic : Poor;Constant support  Ambulation/Gait Training:  Distance (ft): 3 Feet (ft)(took few side steps towards the [facility] )  Assistive Device: Gait belt;Walker, rolling  Ambulation - Level of Assistance: Moderate assistance    Base of Support: Narrowed      Functional Measure:    [address] [place] [id number] AM-PAC 6 Clicks         Basic Mobility Inpatient Short Form  How much difficulty does the patient currently have. .. Unable A Lot A Little None   1. Turning over in bed (including adjusting bedclothes, sheets and blankets)? [] 1   [] 2   [x] 3   [] 4   2. Sitting down on and standing up from a chair with arms ( e.g., wheelchair, bedside commode, etc.)   [] 1   [] 2   [x] 3   [] 4   3. Moving from lying on back to sitting on the side of the bed? [] 1   [] 2   [x] 3   [] 4          How much help from another person does the patient currently need. .. Total A Lot A Little None   4. Moving to and from a bed to a chair (including a wheelchair)? [] 1   [x] 2   [] 3   [] 4   5. Need to walk in hospital room? [] 1   [x] 2   [] 3   [] 4   6. Climbing 3-5 steps with a railing? [x] 1   [] 2   [] 3   [] 4   © 2007, Trustees of [address] [place] [address] [address] [address], under license to [employer].  All rights reserved     Score:  Initial:14 Most Recent: X (Date: 5/10/21-- )   Interpretation of Tool:  Represents activities that are increasingly more difficult (i.e. Bed mobility, Transfers, Gait). Score 24 23 22-20 19-15 14-10 9-7 6   Modifier CH CI CJ CK CL CM CN          Physical Therapy Evaluation Charge Determination   History Examination Presentation Decision-Making   MEDIUM  Complexity : 1-2 comorbidities / personal factors will impact the outcome/ POC  MEDIUM Complexity : 3 Standardized tests and measures addressing body structure, function, activity limitation and / or participation in recreation  LOW Complexity : Stable, uncomplicated  Other Functional Measure [facility] 6 medium complexity      Based on the above components, the patient evaluation is determined to be of the following complexity level: LOW     Pain Ratin/10    Activity Tolerance:   Fair  Please refer to the flowsheet for vital signs taken during this treatment. After treatment patient left in no apparent distress:   Supine in bed, Call bell within reach and Bed / chair alarm activated    COMMUNICATION/EDUCATION:   The patients plan of care was discussed with: Registered nurse. Fall prevention education was provided and the patient/caregiver indicated understanding. and Patient/family agree to work toward stated goals and plan of care. Problem: Mobility Impaired (Adult and Pediatric)  Goal: *Acute Goals and Plan of Care (Insert Text)  Description: Pt will be I with LE HEP in 7 days. Pt will perform bed mobility with mod I in 7 days. Pt will perform transfers with CGA in 7 days. Pt will amb -40 feet with LRAD safely with Calista in 7 days.    Outcome: Not Met     Thank you for this referral.  Fabiola Bergman   Time Calculation: 39 mins

## 2021-05-10 NOTE — PROGRESS NOTES
Problem: Falls - Risk of  Goal: *Absence of Falls  Description: Document Omid Shove Fall Risk and appropriate interventions in the flowsheet. Outcome: Progressing Towards Goal  Note: Fall Risk Interventions:  Mobility Interventions: PT Consult for mobility concerns    Mentation Interventions: Eyeglasses and hearing aids, Door open when patient unattended, More frequent rounding, Reorient patient         Elimination Interventions: Call light in reach              Problem: Patient Education: Go to Patient Education Activity  Goal: Patient/Family Education  Outcome: Progressing Towards Goal     Problem: Pressure Injury - Risk of  Goal: *Prevention of pressure injury  Description: Document Garth Scale and appropriate interventions in the flowsheet.   Outcome: Progressing Towards Goal  Note: Pressure Injury Interventions:       Moisture Interventions: Absorbent underpads    Activity Interventions: Assess need for specialty bed, Increase time out of bed, PT/OT evaluation    Mobility Interventions: PT/OT evaluation    Nutrition Interventions: Document food/fluid/supplement intake                     Problem: Patient Education: Go to Patient Education Activity  Goal: Patient/Family Education  Outcome: Progressing Towards Goal

## 2021-05-10 NOTE — H&P
History and Physical    Patient: Tip Echeverria MRN: [id number]  SSN: [id number]    YOB: 1945  Age: 77 y.o. Sex: female      Subjective:      Chief Complaint: Multiple falls, confusion    HPI: Tip Echeverria is a 77 y.o. female with past medical history of essential tremors, hyperlipidemia, hypothyroidism and urinary incontinence presenting to the ER from home for evaluation of confusion and multiple falls. Ms. Latoya Marrero is currently alert and oriented x3 at the time of my examination. She gives a history of essential tremors and requiring a walker to assist with ambulation over the past several years. She reports multiple falls over the past several days. Yesterday, emergency medical services were called after neighbor found patient on the floor of her home. Initially patient refused to come to the ER. Yesterday evening, cousin found her on the floor with some confusion. Ms. Latoya Marrero reports recent nausea, vomiting, chills. She has had some increased fatigue and weakness over the past several days. She denies recent fever, abdominal pain, dysuria, expressive aphasia, numbness/tingling of extremities, facial droop, headache, change in vision, shortness of breath, chest pain or palpitations. Ms. Latoya Marrero was brought to the ER for further treatment and evaluation. On arrival to the ER, temperature is 97.6 °F, blood pressure 170/65, pulse 76, respirations 17 and oxygen saturation 98% on room air. Stroke alert was called by EMS. On initial examination by ER physician, Ms. Latoya Marrero did not have her dentures in and had a hard time speaking. She was found to have no neurological deficits. Neurologist was consulted via telemedicine. CTA of the head reports normal CT angiogram of the brain. CT of the C-spine had no evidence of acute fracture or injury. Neurologist did not recommend TPA. On examination, patient had no neurological deficits.   Abnormal laboratory work includes white blood cell count 12.2, lactic acid 5.6 and urinalysis was positive for nitrites, leukocyte esterase, bacteria and white blood cells. Chest x-ray had no evidence of acute cardiopulmonary process. X-ray of the left and right knee have no evidence of acute fractures. Aspirin, IV Zosyn and potassium chloride (20 mEq) was administered in the ER. Hospital service has been asked to admit for further treatment and evaluation. Past medical history, past surgical history, family history, social history and home medication list was reviewed at the time of admission. Ms. Barbara Vera denies a history of tobacco, alcohol or illicit drug use. Ms. Barbara Vera currently lives alone and uses a walker to assist with ambulation. Patient is a full code. Past Medical History:   Diagnosis Date    Essential tremor     Hypercholesterolemia     Hypothyroidism     Urinary incontinence      History reviewed. No pertinent surgical history. History reviewed. No pertinent family history. Social History     Tobacco Use    Smoking status: Never Smoker    Smokeless tobacco: Never Used   Substance Use Topics    Alcohol use: Not Currently      Prior to Admission medications    Medication Sig Start Date End Date Taking? Authorizing Provider   atorvastatin (LIPITOR) 40 mg tablet Take 40 mg by mouth nightly. 5/3/21  Yes Provider, Historical   levothyroxine (SYNTHROID) 75 mcg tablet Take 75 mcg by mouth daily. 4/14/21  Yes Provider, Historical   Detrol LA 2 mg ER capsule Take 2 mg by mouth daily. 4/14/21  Yes Provider, Historical   propranoloL (INDERAL) 10 mg tablet Take 10 mg by mouth two (2) times a day. 2/26/21   Provider, Historical        Allergies   Allergen Reactions    Codeine Unknown (comments)       Review of Systems:  Constitutional: Positive for falls, generalized weakness, fatigue, chills. Denies fevers, unexplained weight loss, night sweats.   Head, Eyes, Ears, Nose, Mouth, Throat: Denies nasal congestion, sore throat, rhinorrhea, earache, ringing of the ears, difficulty hearing, facial pain, facial swelling. Respiratory: Denies shortness of breath, wheezing, cough, sputum production, hemoptysis. Denies use of oxygen at home. Cardiovascular: Denies chest pain, irregular heart beat, racing pulse, lower extremity edema, dizziness, dyspnea on exertion, orthopnea. Gastrointestinal: Positive for nausea, vomiting. Denies diarrhea, constipation, abdominal pain, loss of appetite, acid reflux, melena, hematochezia, change in bowel habits. Endocrine: Denies intolerance to heat or cold. Denies polyuria, polydipsia, polyphagia. Genitourinary: No increased urinary frequency, dysuria, hematuria, urinary incontinence, increased urinary frequency  Integument/Breast: Denies rash, itching, new skin lesion. Musculoskeletal: Denies joint swelling, joint pain, myalgias, neck pain, back pain. Neurological: Denies headaches, dizziness, confusion, tremors, numbness/tingling, weakness, problems with balance, loss of consciousness. Hematologic: Denies easy bleeding, easy bruising, lymphadenopathy. Behavioral/Psychiatric: Denies anxiety, depression, increased irritability, mood swings, delusions, hallucination, SI/HI. Objective:     Vitals:    05/09/21 2200 05/09/21 2309 05/10/21 0122   BP: (!) 170/65 134/80    Pulse: 76 96 82   Resp: 17 24    Temp: 97.6 °F (36.4 °C)     SpO2: 98% 100%    Weight: 56.2 kg (124 lb)     Height: 5' 2\" (1.575 m)          Physical Exam:  General: Alert and Oriented x 3. Cooperative and friendly. No acute distress. Nourished and well developed. Patient is hard of hearing. Head/Eyes: Normocephalic, atraumatic, EOMI, PERRLA. Nose/Mouth: Turbinates within normal limits, No drainage. Mucous membranes are moist.  Edentulous. Throat and Neck: No masses, JVD, thyromegaly or lymphadenopathy appreciated. Cervical spine has good range of motion without pain. Lung: Clear to auscultation bilaterally without wheezes, rhonchi or crackles.  Good air movement bilaterally. Symmetric chest rise with respirations. Heart: Regular rate and rhythm. Normal S1/S2. No appreciated murmurs, rubs or gallops. No lower extremity edema. Abdomen: Soft, non-tender, non-distended. Bowel sounds present in all four quadrants. No masses appreciated. Extremities:  Atraumatic. Able to move all extremities symmetrically. No abnormal bony protuberances appreciated. Resting tremor in upper extremities. Back: No pain with palpation over spinous processes or paraspinal musculature. No CVA tenderness. Skin: Clean, dry and intact without appreciated lesions. Neurologic: A&Ox3. Cranial nerves 2-12 are grossly intact. Intact sensation and motor strength in all 4 extremities. Facial features are symmetric and speech is clear. No focal deficits. Psychiatric: Normal affect, normal thought process, good eye contact.      Recent Results (from the past 24 hour(s))   URINALYSIS W/ RFLX MICROSCOPIC    Collection Time: 05/09/21  9:50 PM   Result Value Ref Range    Color Yellow/Straw      Appearance Clear Clear      Specific gravity 1.023 1.003 - 1.030      pH (UA) 5.0 5.0 - 8.0      Protein Negative Negative mg/dL    Glucose Negative Negative mg/dL    Ketone Negative Negative mg/dL    Bilirubin Negative Negative      Blood Moderate (A) Negative      Urobilinogen 0.1 0.1 - 1.0 EU/dL    Nitrites Positive (A) Negative      Leukocyte Esterase Negative Negative      WBC 5-10 0 - 4 /hpf    RBC 0-5 0 - 5 /hpf    Bacteria 3+ (A) Negative /hpf    Mucus Trace /lpf   URINE MICROSCOPIC    Collection Time: 05/09/21  9:50 PM   Result Value Ref Range    WBC 5-10 0 - 4 /hpf    RBC 0-5 0 - 5 /hpf    Bacteria 3+ (A) Negative /hpf   CBC WITH AUTOMATED DIFF    Collection Time: 05/09/21 10:05 PM   Result Value Ref Range    WBC 12.2 (H) 3.6 - 11.0 K/uL    RBC 4.19 3.80 - 5.20 M/uL    HGB 13.5 11.5 - 16.0 g/dL    HCT 38.9 35.0 - 47.0 %    MCV 92.8 80.0 - 99.0 FL    MCH 32.2 26.0 - 34.0 PG    MCHC 34.7 30.0 - 36.5 g/dL    RDW 13.2 11.5 - 14.5 %    PLATELET 162 972 - 468 K/uL    MPV 9.7 8.9 - 12.9 FL    NRBC 0.0 0.0  WBC    ABSOLUTE NRBC 0.00 0.00 - 0.01 K/uL    NEUTROPHILS 87 (H) 32 - 75 %    LYMPHOCYTES 7 (L) 12 - 49 %    MONOCYTES 6 5 - 13 %    EOSINOPHILS 0 0 - 7 %    BASOPHILS 0 0 - 1 %    IMMATURE GRANULOCYTES 0 0 - 0.5 %    ABS. NEUTROPHILS 10.5 (H) 1.8 - 8.0 K/UL    ABS. LYMPHOCYTES 0.9 0.8 - 3.5 K/UL    ABS. MONOCYTES 0.7 0.0 - 1.0 K/UL    ABS. EOSINOPHILS 0.0 0.0 - 0.4 K/UL    ABS. BASOPHILS 0.0 0.0 - 0.1 K/UL    ABS. IMM. GRANS. 0.0 0.00 - 0.04 K/UL    DF AUTOMATED     PROTHROMBIN TIME + INR    Collection Time: 05/09/21 10:05 PM   Result Value Ref Range    Prothrombin time 13.3 11.9 - 14.7 sec    INR 1.0 0.9 - 1.1     METABOLIC PANEL, COMPREHENSIVE    Collection Time: 05/09/21 10:05 PM   Result Value Ref Range    Sodium 142 136 - 145 mmol/L    Potassium 3.2 (L) 3.5 - 5.1 mmol/L    Chloride 112 (H) 97 - 108 mmol/L    CO2 17 (L) 21 - 32 mmol/L    Anion gap 13 5 - 15 mmol/L    Glucose 114 (H) 65 - 100 mg/dL    BUN 12 6 - 20 mg/dL    Creatinine 0.90 0.55 - 1.02 mg/dL    BUN/Creatinine ratio 13 12 - 20      GFR est AA >60 >60 ml/min/1.73m2    GFR est non-AA >60 >60 ml/min/1.73m2    Calcium 9.6 8.5 - 10.1 mg/dL    Bilirubin, total 1.0 0.2 - 1.0 mg/dL    AST (SGOT) 52 (H) 15 - 37 U/L    ALT (SGPT) 43 12 - 78 U/L    Alk.  phosphatase 106 45 - 117 U/L    Protein, total 7.1 6.4 - 8.2 g/dL    Albumin 3.5 3.5 - 5.0 g/dL    Globulin 3.6 2.0 - 4.0 g/dL    A-G Ratio 1.0 (L) 1.1 - 2.2     TROPONIN I    Collection Time: 05/09/21 10:05 PM   Result Value Ref Range    Troponin-I, Qt. <0.05 <0.05 ng/mL   MAGNESIUM    Collection Time: 05/09/21 10:05 PM   Result Value Ref Range    Magnesium 1.4 (L) 1.6 - 2.4 mg/dL   LACTIC ACID    Collection Time: 05/09/21 10:05 PM   Result Value Ref Range    Lactic acid 5.6 (HH) 0.4 - 2.0 mmol/L   CK    Collection Time: 05/09/21 10:05 PM   Result Value Ref Range    CK 1,109 (H) 26 - 192 U/L XR Results (maximum last 3): Results from [place] encounter on 05/09/21   XR KNEE RT 3 V    Narrative EXAMINATION: XR KNEE RT 3 V    HISTORY: Fall    COMPARISON: None available. FINDINGS: 3 view(s) of the right knee are submitted. Study is slightly  overpenetrated, which limits evaluation of the soft tissues. There is no acute  fracture or dislocation. There may be trace fluid in the joint. Impression 1. No acute fracture   XR KNEE LT 3 V    Narrative EXAMINATION: XR KNEE LT 3 V    HISTORY: Fall    COMPARISON: None available. FINDINGS: 3 view(s) of the left knee are submitted. There is no acute fracture  or dislocation. No definite joint effusion. There are mild degenerative changes. Impression 1. No acute fracture of the left knee. XR CHEST PORT    Narrative EXAMINATION: XR CHEST PORT    HISTORY: Fall    COMPARISON: 11/21/2016    FINDINGS: Single view. The lungs are clear. There is no pneumothorax or pleural  effusion. Heart size and mediastinal contours are unchanged. The thoracic aorta  is atherosclerotic. Small, incompletely evaluated focus of sclerosis in the left  proximal humerus. Impression No acute cardiopulmonary abnormality. CT Results (maximum last 3): Results from [place] encounter on 05/09/21   CTA CODE NEURO HEAD AND NECK W CONT    Narrative HISTORY:  weakness  Dose reduction technique: All CT scans at this facility are performed using dose reduction optimization  technique as appropriate on the exam including the following: Automated exposure  control, adjustment of the MA and/or KV according to patient size of use of  iterative reconstructive technique. .    TECHNIQUE: CTA CODE NEURO NECK W CONT   CT angiogram of the neck performed and  maximum intensity projection images (MIPS) generated. 100 cc Isovue-370 injected.                             All stenosis measurements performed using NASCET  criteria  COMPARISON: None  LIMITATIONS: None    AORTIC ARCH: Visualized aortic arch normal  CAROTID ARTERIES: Normal  VERTEBRAL ARTERIES: Normal    OTHER ARTERIES: Other visualized arteries appear unremarkable  VENOUS STRUCTURES: Visualized venous structures appear unremarkable  OSSEOUS STRUCTURES and SOFT TISSUES: Normal  OTHER: None      Impression Normal CT angiogram of the neck. HISTORY:  weakness  Dose reduction technique: All CT scans at this facility are performed using dose reduction optimization  technique as appropriate on the exam including the following: Automated exposure  control, adjustment of the MA and/or KV according to patient size and/or use of  iterative reconstructive technique. TECHNIQUE: CTA CODE NEURO HEAD W CONTCT angiogram of the brain was performed and  maximum intensity projection images (MIPS) were generated. 100 cc Isovue-370 injected. All stenosis measurements performed using NASCET  criteria. COMPARISON: Brain CT performed as previous to this examination  LIMITATIONS: None      Noncontrast brain CT demonstrates no evidence of acute intracranial hemorrhage,  mass effect, or midline shift. No acute extra-axial abnormalities. Ventricles  are patent and clear. Postcontrast brain CT angiogram findings:  CAROTID ARTERIES: Normal  ANTERIOR CEREBRAL ARTERIES: Normal  MIDDLE CEREBRAL ARTERIES: Normal  POSTERIOR CEREBRAL ARTERIES: Normal  BASILAR ARTERY: Normal  VERTEBRAL ARTERIES: Normal  VENOUS STRUCTURES: Visualized venous structures unremarkable. OTHER: None    IMPRESSION: Normal CT angiogram of the brain. CT SPINE LUMB WO CONT    Narrative HISTORY:  fall. pain  Dose reduction technique:   All CT scans at this facility are performed using dose reduction optimization  technique as appropriate on the exam including the following: Automated exposure  control, adjustment of the MA and/or KV according to patient size of use of  iterative reconstructive technique. .    TECHNIQUE: Noncontrast CT of the lumbar spine with multiplanar reconstructions. COMPARISON: 7/11/2019 abdominopelvic CT  LIMITATIONS: None    FRACTURES: No acute fractures. Superior endplate depression at P34 and L4 are  unchanged from the patient's prior exam.  ALIGNMENT: Normal  VERTEBRAL BODIES: Some mild anterior osteophyte formation. Superior endplate  depressions at T12 and L4 again noted. DISC SPACES: Mild posterior disc bulge at L2-3. Posterior disc/osteophyte  complex at L3-4 and with mild bilateral neural foraminal narrowing. POSTERIOR ELEMENTS: Mild multilevel facet arthropathic changes. SPINAL CANAL: Normal  SACROILIAC JOINT: Visualized portions unremarkable  PARASPINAL SOFT TISSUES: Normal    OTHER: None      Impression Mild multilevel degenerative disc changes, chronic superior endplate  depressions of T12 and L4, but no acute findings related to the patient's  current trauma. CT CODE NEURO HEAD WO CONTRAST    Narrative EXAMINATION: Computed tomography of the head without contrast.    HISTORY: Weakness    TECHNIQUE: Routine axial images were obtained through the brain without the use  of IV contrast. Sagittal and coronal reformatted images were performed at the CT  console. Dose Reduction: All CT scans at this facility are performed using dose reduction  optimization techniques as appropriate to a performed exam including the  following: Automated exposure control, adjustments of the mA and/or kV according  to patient size, or use of iterative reconstruction technique. COMPARISON: CT angiography of the head dated 8/12/2011    FINDINGS:   There is no acute intracranial hemorrhage. There is moderate periventricular and  deep white matter hypoattenuation. No definite CT evidence of acute vascular  territorial distribution infarction. There is mild generalized volume loss. Ventricles are normal in size and  morphology.  There is no mass effect or midline shift. The basilar cisterns are  patent. Native lenses are absent. There are degenerative changes of the  temporomandibular joints. There is mild mucosal thickening of the maxillary sinuses. Mastoid air cells are  clear. There is no acute calvarial fracture. Impression 1. No acute intracranial abnormality. 2. Atrophy with periventricular white matter hypoattenuation, nonspecific, but  most commonly seen in the setting of chronic small vessel ischemic disease. I called these results to Dr. Yohan Olivera at 10:00 PM on 5/9/2021       Assessment:     Beverley Fisher is a 77 y.o. female who presents with generalized weakness, fatigue, falls, chills and nausea with vomiting. EMS called stroke alert because patient had difficulty speaking. Ms. Alexandra Marcos did not have her dentures and had a hard time speaking initially on arrival.  At the time of my examination, patient does not have any neurological deficits. I have low suspicion for acute stroke. Urine analysis is positive for acute infection and lactic acid is 5.6. Admit Ms. Alexandra Marcos for sepsis secondary to urinary tract infection. Plan:     1. Admit to remote telemetry bed. 2. Change IV Zosyn to ceftriaxone. Monitor blood cultures. Order urine culture. 3. Repeat lactic acid. 4. Hypokalemia, potassium was replenished with p.o. supplements. Continue to monitor electrolytes during hospitalization and replenish as needed. 5. Order physical therapy consult for complaints of multiple falls and generalized weakness. 6. For history of hypothyroidism, continue home dose of Synthroid and check TSH level. 7. For history of essential tremor, continue home dose of propranolol (with holding parameters). 8. For history of hyperlipidemia, continue home dose of Lipitor. Check lipid panel. 9. History of urinary incontinence, continue home dose of Detrol. GI PPX: Diet ordered. DVT PPX: SCDs.      Signed By: Eliane Cohen MD     May 10, 2021

## 2021-05-10 NOTE — ED TRIAGE NOTES
Earlier today friends found her in the floor and called EMS but she refused transport at that time, later today cousin came over and felt that she was a little more confused than normal and not as alert as normal and that her speech is different than it normal is.  Stroke alert called at triage

## 2021-05-10 NOTE — PROGRESS NOTES
12 Hr Chart Check      Hospitalist Progress Note    Subjective:   Daily Progress Note: 5/10/2021 7:54 AM    Nettie Allison is a 77 y.o. female with past medical history of essential tremors, hyperlipidemia, hypothyroidism and urinary incontinence presenting to the ER from home for evaluation of confusion and multiple falls. She gives a history of essential tremors and requiring a walker to assist with ambulation over the past several years. In  the ER, temperature is 97.6 °F, blood pressure 170/65, pulse 76, respirations 17 and oxygen saturation 98% on room air. Stroke alert was called by EMS. Neurologist was consulted via telemedicine. CTA of the head reports normal CT angiogram of the brain. CT of the C-spine had no evidence of acute fracture or injury. Neurologist did not recommend TPA. Abnormal laboratory work includes white blood cell count 12.2, lactic acid 5.6 and urinalysis was positive for nitrites, leukocyte esterase, bacteria and white blood cells. Chest x-ray had no evidence of acute cardiopulmonary process. X-ray of the left and right knee have no evidence of acute fractures. Aspirin, IV Zosyn and potassium chloride (20 mEq) was administered in the ER. Started on IV rocephin. PT/OT consulted. Urine and blood cultures pending. Subjective: No acute issues overnight.      Current Facility-Administered Medications   Medication Dose Route Frequency    acetaminophen (TYLENOL) tablet 650 mg  650 mg Oral Q6H PRN    Or    acetaminophen (TYLENOL) suppository 650 mg  650 mg Rectal Q6H PRN    polyethylene glycol (MIRALAX) packet 17 g  17 g Oral DAILY PRN    promethazine (PHENERGAN) tablet 12.5 mg  12.5 mg Oral Q6H PRN    Or    ondansetron (ZOFRAN) injection 4 mg  4 mg IntraVENous Q6H PRN    0.9% sodium chloride infusion  75 mL/hr IntraVENous CONTINUOUS    cefTRIAXone (ROCEPHIN) 1 g in sterile water (preservative free) 10 mL IV syringe  1 g IntraVENous Q12H    atorvastatin (LIPITOR) tablet 40 mg  40 mg Oral QHS    . PHARMACY TO SUBSTITUTE PER PROTOCOL (Reordered from: Detrol LA 2 mg ER capsule)    Per Protocol    levothyroxine (SYNTHROID) tablet 75 mcg  75 mcg Oral DAILY    propranoloL (INDERAL) tablet 10 mg  10 mg Oral BID        Review of Systems  Constitutional: No fevers, No chills, No sweats, ++ fatigue, ++Weakness  Eyes: No redness  Ears, nose, mouth, throat, and face: No nasal congestion, No sore throat, No voice change  Respiratory: No Shortness of Breath, No cough, No wheezing  Cardiovascular: No chest pain, No palpitations, No extremity edema  Gastrointestinal: No nausea, No vomiting, No diarrhea, No abdominal pain  Genitourinary: No frequency, No dysuria, No hematuria  Integument/breast: No skin lesion(s)   Neurological: No Confusion, No headaches, No dizziness      Objective:     Visit Vitals  /78   Pulse 95   Temp 98.4 °F (36.9 °C)   Resp 20   Ht 5' 2\" (1.575 m)   Wt 56.2 kg (124 lb)   SpO2 98%   Breastfeeding No   BMI 22.68 kg/m²           Temp (24hrs), Av °F (36.7 °C), Min:97.6 °F (36.4 °C), Max:98.4 °F (36.9 °C)      No intake/output data recorded.  1901 - 05/10 0700  In: 1100 [I.V.:1100]  Out: -     PHYSICAL EXAM:  Constitutional: elderly appearing, No acute distress  Skin: Extremities and face reveal no rashes. HEENT: Sclerae anicteric. Extra-occular muscles are intact. No oral ulcers. The neck is supple and no masses. Cardiovascular: Regular rate and rhythm. Respiratory:  Clear breath sounds bilaterally with no wheezes, rales, or rhonchi. GI: Abdomen nondistended, soft, and nontender. Normal active bowel sounds. Musculoskeletal: No pitting edema of the lower legs. Able to move all ext  Neurological:  Patient is alert and oriented.  Cranial nerves II-XII grossly intact  Psychiatric: Mood appears appropriate       Data Review    Recent Results (from the past 24 hour(s))   URINALYSIS W/ RFLX MICROSCOPIC    Collection Time: 21  9:50 PM   Result Value Ref Range Color Yellow/Straw      Appearance Clear Clear      Specific gravity 1.023 1.003 - 1.030      pH (UA) 5.0 5.0 - 8.0      Protein Negative Negative mg/dL    Glucose Negative Negative mg/dL    Ketone Negative Negative mg/dL    Bilirubin Negative Negative      Blood Moderate (A) Negative      Urobilinogen 0.1 0.1 - 1.0 EU/dL    Nitrites Positive (A) Negative      Leukocyte Esterase Negative Negative      WBC 5-10 0 - 4 /hpf    RBC 0-5 0 - 5 /hpf    Bacteria 3+ (A) Negative /hpf    Mucus Trace /lpf   URINE MICROSCOPIC    Collection Time: 05/09/21  9:50 PM   Result Value Ref Range    WBC 5-10 0 - 4 /hpf    RBC 0-5 0 - 5 /hpf    Bacteria 3+ (A) Negative /hpf   CBC WITH AUTOMATED DIFF    Collection Time: 05/09/21 10:05 PM   Result Value Ref Range    WBC 12.2 (H) 3.6 - 11.0 K/uL    RBC 4.19 3.80 - 5.20 M/uL    HGB 13.5 11.5 - 16.0 g/dL    HCT 38.9 35.0 - 47.0 %    MCV 92.8 80.0 - 99.0 FL    MCH 32.2 26.0 - 34.0 PG    MCHC 34.7 30.0 - 36.5 g/dL    RDW 13.2 11.5 - 14.5 %    PLATELET 473 523 - 582 K/uL    MPV 9.7 8.9 - 12.9 FL    NRBC 0.0 0.0  WBC    ABSOLUTE NRBC 0.00 0.00 - 0.01 K/uL    NEUTROPHILS 87 (H) 32 - 75 %    LYMPHOCYTES 7 (L) 12 - 49 %    MONOCYTES 6 5 - 13 %    EOSINOPHILS 0 0 - 7 %    BASOPHILS 0 0 - 1 %    IMMATURE GRANULOCYTES 0 0 - 0.5 %    ABS. NEUTROPHILS 10.5 (H) 1.8 - 8.0 K/UL    ABS. LYMPHOCYTES 0.9 0.8 - 3.5 K/UL    ABS. MONOCYTES 0.7 0.0 - 1.0 K/UL    ABS. EOSINOPHILS 0.0 0.0 - 0.4 K/UL    ABS. BASOPHILS 0.0 0.0 - 0.1 K/UL    ABS. IMM.  GRANS. 0.0 0.00 - 0.04 K/UL    DF AUTOMATED     PROTHROMBIN TIME + INR    Collection Time: 05/09/21 10:05 PM   Result Value Ref Range    Prothrombin time 13.3 11.9 - 14.7 sec    INR 1.0 0.9 - 1.1     METABOLIC PANEL, COMPREHENSIVE    Collection Time: 05/09/21 10:05 PM   Result Value Ref Range    Sodium 142 136 - 145 mmol/L    Potassium 3.2 (L) 3.5 - 5.1 mmol/L    Chloride 112 (H) 97 - 108 mmol/L    CO2 17 (L) 21 - 32 mmol/L    Anion gap 13 5 - 15 mmol/L    Glucose 114 (H) 65 - 100 mg/dL    BUN 12 6 - 20 mg/dL    Creatinine 0.90 0.55 - 1.02 mg/dL    BUN/Creatinine ratio 13 12 - 20      GFR est AA >60 >60 ml/min/1.73m2    GFR est non-AA >60 >60 ml/min/1.73m2    Calcium 9.6 8.5 - 10.1 mg/dL    Bilirubin, total 1.0 0.2 - 1.0 mg/dL    AST (SGOT) 52 (H) 15 - 37 U/L    ALT (SGPT) 43 12 - 78 U/L    Alk. phosphatase 106 45 - 117 U/L    Protein, total 7.1 6.4 - 8.2 g/dL    Albumin 3.5 3.5 - 5.0 g/dL    Globulin 3.6 2.0 - 4.0 g/dL    A-G Ratio 1.0 (L) 1.1 - 2.2     TROPONIN I    Collection Time: 05/09/21 10:05 PM   Result Value Ref Range    Troponin-I, Qt. <0.05 <0.05 ng/mL   MAGNESIUM    Collection Time: 05/09/21 10:05 PM   Result Value Ref Range    Magnesium 1.4 (L) 1.6 - 2.4 mg/dL   LACTIC ACID    Collection Time: 05/09/21 10:05 PM   Result Value Ref Range    Lactic acid 5.6 (HH) 0.4 - 2.0 mmol/L   CK    Collection Time: 05/09/21 10:05 PM   Result Value Ref Range    CK 1,109 (H) 26 - 192 U/L       Radiology review: CTA of head, spine, and XR of chest and knees BL    Assessment:   1. Acute UTI/urinary incontinence  2. Metabolic encephalopathy  3. Hypothyroidism  4. Essential tremor  5. Hyperlipidemia  6. Generalized weakness with multiple falls    Plan:    1. Patient been afebrile. Urinalysis positive. Will order for urine culture. Blood culture pending. On IV Rocephin. Lactic acid 5.6. Continue with IV fluids  2. CT of the head showed no evidence of acute stroke. Confusion most likely secondary to urinary tract infection and hypokalemia. 3.  Continue with Synthroid  4. Propanolol  5. On Lipitor  6. PT OT  7. Case management for discharge planning  8. CBC BMP lactic acid in a.m. CODE STATUS Full     DVT prophylaxis: SCDs  Ulcer prophylaxis: Protonix    Care Plan discussed with: Patient/Family, Nurse and        Cousin --> Sy Nura [phone]. Called and updated her. Answered all question. Concern for safety at home due to multiple falls. Total time spent with patient: 34 minutes.

## 2021-05-11 LAB
ANION GAP SERPL CALC-SCNC: 6 MMOL/L (ref 5–15)
BASOPHILS # BLD: 0.1 K/UL (ref 0–0.1)
BASOPHILS NFR BLD: 1 % (ref 0–1)
BUN SERPL-MCNC: 10 MG/DL (ref 6–20)
BUN/CREAT SERPL: 14 (ref 12–20)
CA-I BLD-MCNC: 8.9 MG/DL (ref 8.5–10.1)
CHLORIDE SERPL-SCNC: 116 MMOL/L (ref 97–108)
CHOLEST SERPL-MCNC: 112 MG/DL
CO2 SERPL-SCNC: 22 MMOL/L (ref 21–32)
CREAT SERPL-MCNC: 0.7 MG/DL (ref 0.55–1.02)
DIFFERENTIAL METHOD BLD: ABNORMAL
EOSINOPHIL # BLD: 0.3 K/UL (ref 0–0.4)
EOSINOPHIL NFR BLD: 5 % (ref 0–7)
ERYTHROCYTE [DISTWIDTH] IN BLOOD BY AUTOMATED COUNT: 13.5 % (ref 11.5–14.5)
GLUCOSE SERPL-MCNC: 74 MG/DL (ref 65–100)
HCT VFR BLD AUTO: 35.6 % (ref 35–47)
HDLC SERPL-MCNC: 52 MG/DL
HDLC SERPL: 2.2 {RATIO} (ref 0–5)
HGB BLD-MCNC: 12 G/DL (ref 11.5–16)
IMM GRANULOCYTES # BLD AUTO: 0 K/UL (ref 0–0.04)
IMM GRANULOCYTES NFR BLD AUTO: 0 % (ref 0–0.5)
LACTATE SERPL-SCNC: 1.3 MMOL/L (ref 0.4–2)
LDLC SERPL CALC-MCNC: 44.6 MG/DL (ref 0–100)
LIPID PROFILE,FLP: NORMAL
LYMPHOCYTES # BLD: 1.9 K/UL (ref 0.8–3.5)
LYMPHOCYTES NFR BLD: 27 % (ref 12–49)
MCH RBC QN AUTO: 32.3 PG (ref 26–34)
MCHC RBC AUTO-ENTMCNC: 33.7 G/DL (ref 30–36.5)
MCV RBC AUTO: 96 FL (ref 80–99)
MONOCYTES # BLD: 0.6 K/UL (ref 0–1)
MONOCYTES NFR BLD: 9 % (ref 5–13)
NEUTS SEG # BLD: 4.2 K/UL (ref 1.8–8)
NEUTS SEG NFR BLD: 58 % (ref 32–75)
NRBC # BLD: 0 K/UL (ref 0–0.01)
NRBC BLD-RTO: 0 PER 100 WBC
PLATELET # BLD AUTO: 217 K/UL (ref 150–400)
PMV BLD AUTO: 10 FL (ref 8.9–12.9)
POTASSIUM SERPL-SCNC: 4.2 MMOL/L (ref 3.5–5.1)
RBC # BLD AUTO: 3.71 M/UL (ref 3.8–5.2)
SODIUM SERPL-SCNC: 144 MMOL/L (ref 136–145)
TRIGL SERPL-MCNC: 77 MG/DL (ref ?–150)
VLDLC SERPL CALC-MCNC: 15.4 MG/DL
WBC # BLD AUTO: 7.1 K/UL (ref 3.6–11)

## 2021-05-11 PROCEDURE — 80048 BASIC METABOLIC PNL TOTAL CA: CPT

## 2021-05-11 PROCEDURE — 85025 COMPLETE CBC W/AUTO DIFF WBC: CPT

## 2021-05-11 PROCEDURE — 74011250637 HC RX REV CODE- 250/637: Performed by: FAMILY MEDICINE

## 2021-05-11 PROCEDURE — 74011250636 HC RX REV CODE- 250/636: Performed by: PHYSICIAN ASSISTANT

## 2021-05-11 PROCEDURE — 65270000029 HC RM PRIVATE

## 2021-05-11 PROCEDURE — 97165 OT EVAL LOW COMPLEX 30 MIN: CPT

## 2021-05-11 PROCEDURE — 97530 THERAPEUTIC ACTIVITIES: CPT

## 2021-05-11 PROCEDURE — 83605 ASSAY OF LACTIC ACID: CPT

## 2021-05-11 PROCEDURE — 74011000250 HC RX REV CODE- 250: Performed by: PHYSICIAN ASSISTANT

## 2021-05-11 PROCEDURE — 36415 COLL VENOUS BLD VENIPUNCTURE: CPT

## 2021-05-11 PROCEDURE — 74011250637 HC RX REV CODE- 250/637: Performed by: PHYSICIAN ASSISTANT

## 2021-05-11 RX ORDER — LEVOTHYROXINE SODIUM 25 UG/1
50 TABLET ORAL DAILY
Status: DISCONTINUED | OUTPATIENT
Start: 2021-05-12 | End: 2021-05-13 | Stop reason: HOSPADM

## 2021-05-11 RX ORDER — ENOXAPARIN SODIUM 100 MG/ML
40 INJECTION SUBCUTANEOUS EVERY 24 HOURS
Status: DISCONTINUED | OUTPATIENT
Start: 2021-05-11 | End: 2021-05-13 | Stop reason: HOSPADM

## 2021-05-11 RX ADMIN — LOPERAMIDE HYDROCHLORIDE 4 MG: 2 CAPSULE ORAL at 08:29

## 2021-05-11 RX ADMIN — CEFTRIAXONE SODIUM 2 G: 2 INJECTION, POWDER, FOR SOLUTION INTRAMUSCULAR; INTRAVENOUS at 08:33

## 2021-05-11 RX ADMIN — ACETAMINOPHEN 650 MG: 325 TABLET, FILM COATED ORAL at 16:01

## 2021-05-11 RX ADMIN — TROSPIUM CHLORIDE 20 MG: 20 TABLET, FILM COATED ORAL at 21:00

## 2021-05-11 RX ADMIN — ENOXAPARIN SODIUM 40 MG: 40 INJECTION SUBCUTANEOUS at 12:21

## 2021-05-11 RX ADMIN — PANTOPRAZOLE SODIUM 40 MG: 40 TABLET, DELAYED RELEASE ORAL at 08:29

## 2021-05-11 RX ADMIN — PROPRANOLOL HYDROCHLORIDE 10 MG: 20 TABLET ORAL at 08:28

## 2021-05-11 RX ADMIN — LEVOTHYROXINE SODIUM 75 MCG: 75 TABLET ORAL at 08:29

## 2021-05-11 RX ADMIN — PROPRANOLOL HYDROCHLORIDE 10 MG: 20 TABLET ORAL at 21:21

## 2021-05-11 RX ADMIN — ACETAMINOPHEN 650 MG: 325 TABLET, FILM COATED ORAL at 08:40

## 2021-05-11 RX ADMIN — TROSPIUM CHLORIDE 20 MG: 20 TABLET, FILM COATED ORAL at 08:40

## 2021-05-11 RX ADMIN — ATORVASTATIN CALCIUM 40 MG: 40 TABLET, FILM COATED ORAL at 21:21

## 2021-05-11 NOTE — PROGRESS NOTES
PHYSICAL THERAPY TREATMENT  Patient: Ángel Queen (17 y.o. female)  Date: 5/11/2021  Diagnosis: UTI (urinary tract infection) [N39.0] <principal problem not specified>       Precautions:    Chart, physical therapy assessment, plan of care and goals were reviewed. ASSESSMENT  Patient continues with skilled PT services and is progressing towards goals. Pt. Semi supine in bed upon arrival and agreeable to therapy session. Able to perform bed mobility and transfers. Essential tremors noticed with active movement of BUE. Required Min a to Max a for bed mobility and transfers. Able to bridge well and assist with scooting to [facility]. Attempted 1 sit to stand with tremors increasing. Recommend DC to SNF. Current Level of Function Impacting Discharge (mobility/balance): Endurance, coordination, strength, balance    Other factors to consider for discharge: PMH         PLAN :  Patient continues to benefit from skilled intervention to address the above impairments. Continue treatment per established plan of care. to address goals. Recommendation for discharge: (in order for the patient to meet his/her long term goals)  Therapy up to 5 days/week in SNF setting    This discharge recommendation:  Has been made in collaboration with the attending provider and/or case management    IF patient discharges home will need the following DME: rolling walker and wheelchair       SUBJECTIVE:   Patient stated IM OKAY.     OBJECTIVE DATA SUMMARY:   Critical Behavior:  Neurologic State: Alert  Orientation Level: Oriented X4  Cognition: Appropriate for age attention/concentration, Appropriate safety awareness, Follows commands     Functional Mobility Training:  Bed Mobility:  Rolling: Minimum assistance  Supine to Sit: Minimum assistance  Sit to Supine: Maximum assistance  Scooting: Contact guard assistance        Transfers:  Sit to Stand: Minimum assistance  Stand to Sit: Minimum assistance Balance:  Sitting: Impaired; With support  Sitting - Static: Fair (occasional)  Sitting - Dynamic: Fair (occasional)  Standing: Impaired;Pull to stand; With support  Standing - Static: Constant support;Poor  Standing - Dynamic : Constant support;Poor  Ambulation/Gait Training:                                                        Stairs: Therapeutic Exercises:   Bridge 5x  AP 10x  Heel slide 10x  Pain Ratin    Activity Tolerance:   Fair  Please refer to the flowsheet for vital signs taken during this treatment. After treatment patient left in no apparent distress:   Supine in bed    COMMUNICATION/COLLABORATION:   The patients plan of care was discussed with: Physical therapy assistant.      Hola Long PTA   Time Calculation: 20 mins

## 2021-05-11 NOTE — ROUTINE PROCESS
Bedside and Verbal shift change report given to Patricia Duffy RN (oncoming nurse) by Vishal Rutherford (offgoing nurse). Report included the following information SBAR, Kardex, Intake/Output, MAR, Accordion, Recent Results and Cardiac Rhythm Sinus Rhythm.

## 2021-05-11 NOTE — PROGRESS NOTES
Bedside and Verbal shift change report given to Yanick RASHID  (oncoming nurse) by Mary Dumont (offgoing nurse). Report included the following information SBAR, MAR and Recent Results.

## 2021-05-11 NOTE — PROGRESS NOTES
Problem: Self Care Deficits Care Plan (Adult)  Goal: *Acute Goals and Plan of Care (Insert Text)  Description: Pt will be MI sup<->sit in prep for EOB ADL's  Pt will be MI  LB dressing EOB level  Pt will be MI  sit EOB 10 minutes in prep for EOB ADL's  Pt will be MI  grooming EOB level  Pt will be MI  sit<-> prep for toilet transfer  Pt will be MI  toilet transfer with LRAD  Pt will be MI  toileting/cloth mgmt LRAD  Pt will be MI  grooming standing sink  Pt will be Mi bathing sitting/standing sink LRAD  Pt will be MI wilber UE HEP in prep for self care tasks      Outcome: Not Met     OCCUPATIONAL THERAPY EVALUATION  Patient: Omayra Cao (21 y.o. female)  Date: 5/11/2021  Primary Diagnosis: UTI (urinary tract infection) [N39.0]        Precautions: falls     ASSESSMENT  Pt is 76 y/o female came to [facility] with multiple falls, confusion and adm 5/9/2021 for acute UTI, metabolic encephalopathy, hypothyroidism, essential tremors, generalized weakness. Pt has hx of essential tremors, HLD, hypothyroidism, urinary incontinence. Pt received semi supine in bed A&Ox4 and agreeable for OT eval/tx. Per pt report, pt lives alone in 1 story home with 10 steps wilber rails to enter and is MI for self care (bathing at sink) and functional transfers/mobility using RW. Pt currently presents with decreased balance, decreased activity tolerance, decreased safety awareness, generalized weakness and increased need for assist with self care (mod A LB dressing bed level, max A drink to mouth 2/2 tremors, min A simple grooming increased time) and functional transfers/mobility (min A sup->sit, CGA scooting EOB, mod A sit<->stand and take one side step to right towards HOB in prep for BSC transfer, max A sit->sup 2/2 significant tremors). Pt educated on wilber UE HEP in prep for self care tasks with pt verbalizing understanding.  Pt would benefit from skilled OT services while at [facility] in order to increase safety and independence with self care and functional transfers/mobility. Recommend discharge to home with [facility] when medically appropriate. Other factors to consider for discharge: time since onset, severity of deficits, PLOF        PLAN :  Recommendations and Planned Interventions: self care training, functional mobility training, therapeutic exercise, balance training, therapeutic activities, endurance activities, patient education, and home safety training    Frequency/Duration: Patient will be followed by occupational therapy 5 times a week to address goals. Recommendation for discharge: (in order for the patient to meet his/her long term goals)  SNF    This discharge recommendation:  Has been made in collaboration with the attending provider and/or case management    IF patient discharges home will need the following DME: TBD       SUBJECTIVE:   Patient stated I dont always shake this bad.     OBJECTIVE DATA SUMMARY:   HISTORY:   Past Medical History:   Diagnosis Date    Essential tremor     Hypercholesterolemia     Hypothyroidism     Urinary incontinence    History reviewed. No pertinent surgical history. Expanded or extensive additional review of patient history:     Home Situation  Home Environment: Private residence  # Steps to Enter: 10  Rails to Enter: Yes  Hand Rails : Bilateral  One/Two Story Residence: One story  Living Alone: Yes  Patient Expects to be Discharged to[de-identified] Rehabilitation facility  Current DME Used/Available at Home: [employer], rollator, Shower chair    PLOF: Pt MI for ADLS/IADLS, MI with mobility prior to admission. EXAMINATION OF PERFORMANCE DEFICITS:  Cognitive/Behavioral Status:  Neurologic State: Alert  Orientation Level: Oriented X4  Cognition: Appropriate for age attention/concentration; Appropriate safety awareness; Follows commands     Hearing:   Auditory  Auditory Impairment: Hard of hearing, bilateral    Range of Motion:  AROM: Within functional limits     Strength:  Strength: Generally decreased, functional     Coordination:  Coordination: Grossly decreased, non-functional(2/2 tremors at this time)       Balance:  Sitting: Impaired; With support  Sitting - Static: Fair (occasional)  Sitting - Dynamic: Fair (occasional)  Standing: Impaired;Pull to stand; With support  Standing - Static: Constant support;Poor  Standing - Dynamic : Constant support;Poor    Functional Mobility and Transfers for ADLs:  Bed Mobility:  Rolling: Minimum assistance  Supine to Sit: Minimum assistance  Sit to Supine: Maximum assistance  Scooting: Contact guard assistance    Transfers:  Sit to Stand: Moderate assistance  Stand to Sit: Moderate assistance  Assistive Device : Gait Belt;Walker, rolling    ADL Assessment:  Feeding: Maximum assistance    Oral Facial Hygiene/Grooming: Minimum assistance    Lower Body Dressing: Moderate assistance    ADL Intervention and task modifications:  Feeding  Drink to Mouth: Maximum assistance    Grooming  Grooming Assistance: Minimum assistance  Position Performed: (supine in bed)  Washing Face: Minimum assistance    Lower Body Dressing Assistance  Socks: Moderate assistance  Position Performed: Supine    [address] [place] [id number] AM-PACTM \"6 Clicks\"                                                       Daily Activity Inpatient Short Form  How much help from another person does the patient currently need. .. Total; A Lot A Little None   1. Putting on and taking off regular lower body clothing? []  1 [x]  2 []  3 []  4   2. Bathing (including washing, rinsing, drying)? []  1 [x]  2 []  3 []  4   3. Toileting, which includes using toilet, bedpan or urinal? [] 1 [x]  2 []  3 []  4   4. Putting on and taking off regular upper body clothing? []  1 []  2 [x]  3 []  4   5. Taking care of personal grooming such as brushing teeth? []  1 []  2 [x]  3 []  4   6. Eating meals? []  1 [x]  2 []  3 []  4   © 2007, Trustees of [facility] [address] [address], under license to [employer].  All rights reserved     Score: 14/24 Interpretation of Tool:  Represents clinically-significant functional categories (i.e. Activities of daily living). Percentage of Impairment CH    0%   CI    1-19% CJ    20-39% CK    40-59% CL    60-79% CM    80-99% CN     100%   AMPA  Score 6-24 24 23 20-22 15-19 10-14 7-9 6        Occupational Therapy Evaluation Charge Determination   History Examination Decision-Making   LOW Complexity : Brief history review  MEDIUM Complexity : 3-5 performance deficits relating to physical, cognitive , or psychosocial skils that result in activity limitations and / or participation restrictions MEDIUM Complexity : Patient may present with comorbidities that affect occupational performnce. Miniml to moderate modification of tasks or assistance (eg, physical or verbal ) with assesment(s) is necessary to enable patient to complete evaluation       Based on the above components, the patient evaluation is determined to be of the following complexity level: LOW   Pain Rating:  No pain reported    Activity Tolerance:   Fair  Please refer to the flowsheet for vital signs taken during this treatment. After treatment patient left in no apparent distress:    Supine in bed, Call bell within reach, and Bed / chair alarm activated    COMMUNICATION/EDUCATION:   The patients plan of care was discussed with: Registered nurse. Home safety education was provided and the patient/caregiver indicated understanding. and Patient/family have participated as able in goal setting and plan of care. This patients plan of care is appropriate for delegation to hospitals.     Thank you for this referral.  Jose Alberto Lujan  Time Calculation: 31 mins

## 2021-05-11 NOTE — PROGRESS NOTES
Hospitalist Progress Note    Subjective:   Daily Progress Note: 5/11/2021     Floreen Claude is a 77 y.o. female with past medical history of essential tremors, hyperlipidemia, hypothyroidism and urinary incontinence presenting to the ER from home for evaluation of confusion and multiple falls. She gives a history of essential tremors and requiring a walker to assist with ambulation over the past several years. In  the ER, temperature is 97.6 °F, blood pressure 170/65, pulse 76, respirations 17 and oxygen saturation 98% on room air. Stroke alert was called by EMS. Neurologist was consulted via telemedicine. CTA of the head reports normal CT angiogram of the brain. CT of the C-spine had no evidence of acute fracture or injury. Neurologist did not recommend TPA. Abnormal laboratory work includes white blood cell count 12.2, lactic acid 5.6 and urinalysis was positive for nitrites, leukocyte esterase, bacteria and white blood cells. Chest x-ray had no evidence of acute cardiopulmonary process. X-ray of the left and right knee have no evidence of acute fractures. Aspirin,  and potassium chloride (20 mEq) was administered in the ER. Started on IV rocephin. PT/OT consulted. Urine and blood cultures pending. Subjective: No acute issues overnight.      Current Facility-Administered Medications   Medication Dose Route Frequency    acetaminophen (TYLENOL) tablet 650 mg  650 mg Oral Q6H PRN    Or    acetaminophen (TYLENOL) suppository 650 mg  650 mg Rectal Q6H PRN    polyethylene glycol (MIRALAX) packet 17 g  17 g Oral DAILY PRN    promethazine (PHENERGAN) tablet 12.5 mg  12.5 mg Oral Q6H PRN    Or    ondansetron (ZOFRAN) injection 4 mg  4 mg IntraVENous Q6H PRN    atorvastatin (LIPITOR) tablet 40 mg  40 mg Oral QHS    trospium (SANCTURA) tablet 20 mg  20 mg Oral BID    levothyroxine (SYNTHROID) tablet 75 mcg  75 mcg Oral DAILY    propranoloL (INDERAL) tablet 10 mg  10 mg Oral BID    pantoprazole (PROTONIX) tablet 40 mg  40 mg Oral ACB    cefTRIAXone (ROCEPHIN) 2 g in sterile water (preservative free) 20 mL IV syringe  2 g IntraVENous Q24H    loperamide (IMODIUM) capsule 4 mg  4 mg Oral Q6H PRN        Review of Systems  Constitutional: No fevers, No chills, No sweats, + fatigue, ++Weakness  Eyes: No redness  Ears, nose, mouth, throat, and face: No nasal congestion, No sore throat, No voice change  Respiratory: No Shortness of Breath, No cough, No wheezing  Cardiovascular: No chest pain, No palpitations, No extremity edema  Gastrointestinal: No nausea, No vomiting, No diarrhea, No abdominal pain  Genitourinary: No frequency, No dysuria, No hematuria  Integument/breast: No skin lesion(s)   Neurological: No Confusion, No headaches, No dizziness      Objective:     Visit Vitals  /71 (BP 1 Location: Left upper arm, BP Patient Position: At rest)   Pulse 85   Temp 98 °F (36.7 °C)   Resp 18   Ht 5' 2\" (1.575 m)   Wt 56.2 kg (124 lb)   SpO2 97%   Breastfeeding No   BMI 22.68 kg/m²      O2 Device: None (Room air)    Temp (24hrs), Av °F (36.7 °C), Min:97.9 °F (36.6 °C), Max:98 °F (36.7 °C)      No intake/output data recorded.  1901 -  0700  In: 1100 [I.V.:1100]  Out: 900 [Urine:900]    PHYSICAL EXAM:  Constitutional: elderly appearing, No acute distress  Skin: Extremities and face reveal no rashes. HEENT: Sclerae anicteric. Extra-occular muscles are intact. No oral ulcers. The neck is supple and no masses. Cardiovascular: Regular rate and rhythm. Respiratory:  Clear breath sounds bilaterally with no wheezes, rales, or rhonchi. GI: Abdomen nondistended, soft, and nontender. Normal active bowel sounds. Musculoskeletal: No pitting edema of the lower legs. Able to move all ext  Neurological:  Patient is alert and oriented.  Cranial nerves II-XII grossly intact  Psychiatric: Mood appears appropriate       Data Review    Recent Results (from the past 24 hour(s))   METABOLIC PANEL, BASIC    Collection Time: 05/10/21 11:48 AM   Result Value Ref Range    Sodium 144 136 - 145 mmol/L    Potassium 4.0 3.5 - 5.1 mmol/L    Chloride 118 (H) 97 - 108 mmol/L    CO2 19 (L) 21 - 32 mmol/L    Anion gap 7 5 - 15 mmol/L    Glucose 87 65 - 100 mg/dL    BUN 9 6 - 20 mg/dL    Creatinine 0.77 0.55 - 1.02 mg/dL    BUN/Creatinine ratio 12 12 - 20      GFR est AA >60 >60 ml/min/1.73m2    GFR est non-AA >60 >60 ml/min/1.73m2    Calcium 8.9 8.5 - 10.1 mg/dL   MAGNESIUM    Collection Time: 05/10/21 11:48 AM   Result Value Ref Range    Magnesium 1.8 1.6 - 2.4 mg/dL   LACTIC ACID    Collection Time: 05/10/21 11:48 AM   Result Value Ref Range    Lactic acid 2.2 (HH) 0.4 - 2.0 mmol/L   CBC W/O DIFF    Collection Time: 05/10/21 11:48 AM   Result Value Ref Range    WBC 8.0 3.6 - 11.0 K/uL    RBC 3.82 3.80 - 5.20 M/uL    HGB 12.2 11.5 - 16.0 g/dL    HCT 36.3 35.0 - 47.0 %    MCV 95.0 80.0 - 99.0 FL    MCH 31.9 26.0 - 34.0 PG    MCHC 33.6 30.0 - 36.5 g/dL    RDW 13.7 11.5 - 14.5 %    PLATELET 934 728 - 048 K/uL    MPV 10.0 8.9 - 12.9 FL    NRBC 0.0 0.0  WBC    ABSOLUTE NRBC 0.00 0.00 - 0.01 K/uL   TSH 3RD GENERATION    Collection Time: 05/10/21 11:48 AM   Result Value Ref Range    TSH 0.08 (L) 0.36 - 3.74 uIU/mL   LIPID PANEL    Collection Time: 05/10/21 11:48 AM   Result Value Ref Range    LIPID PROFILE        Cholesterol, total 112 <200 mg/dL    Triglyceride 77 <150 mg/dL    HDL Cholesterol 52 mg/dL    LDL, calculated 44.6 0 - 100 mg/dL    VLDL, calculated 15.4 mg/dL    CHOL/HDL Ratio 2.2 0.0 - 5.0     LACTIC ACID    Collection Time: 05/11/21  7:58 AM   Result Value Ref Range    Lactic acid 1.3 0.4 - 2.0 mmol/L   METABOLIC PANEL, BASIC    Collection Time: 05/11/21  7:58 AM   Result Value Ref Range    Sodium 144 136 - 145 mmol/L    Potassium 4.2 3.5 - 5.1 mmol/L    Chloride 116 (H) 97 - 108 mmol/L    CO2 22 21 - 32 mmol/L    Anion gap 6 5 - 15 mmol/L    Glucose 74 65 - 100 mg/dL    BUN 10 6 - 20 mg/dL    Creatinine 0. 70 0.55 - 1.02 mg/dL    BUN/Creatinine ratio 14 12 - 20      GFR est AA >60 >60 ml/min/1.73m2    GFR est non-AA >60 >60 ml/min/1.73m2    Calcium 8.9 8.5 - 10.1 mg/dL   CBC WITH AUTOMATED DIFF    Collection Time: 05/11/21  7:58 AM   Result Value Ref Range    WBC 7.1 3.6 - 11.0 K/uL    RBC 3.71 (L) 3.80 - 5.20 M/uL    HGB 12.0 11.5 - 16.0 g/dL    HCT 35.6 35.0 - 47.0 %    MCV 96.0 80.0 - 99.0 FL    MCH 32.3 26.0 - 34.0 PG    MCHC 33.7 30.0 - 36.5 g/dL    RDW 13.5 11.5 - 14.5 %    PLATELET 558 063 - 464 K/uL    MPV 10.0 8.9 - 12.9 FL    NRBC 0.0 0.0  WBC    ABSOLUTE NRBC 0.00 0.00 - 0.01 K/uL    NEUTROPHILS 58 32 - 75 %    LYMPHOCYTES 27 12 - 49 %    MONOCYTES 9 5 - 13 %    EOSINOPHILS 5 0 - 7 %    BASOPHILS 1 0 - 1 %    IMMATURE GRANULOCYTES 0 0 - 0.5 %    ABS. NEUTROPHILS 4.2 1.8 - 8.0 K/UL    ABS. LYMPHOCYTES 1.9 0.8 - 3.5 K/UL    ABS. MONOCYTES 0.6 0.0 - 1.0 K/UL    ABS. EOSINOPHILS 0.3 0.0 - 0.4 K/UL    ABS. BASOPHILS 0.1 0.0 - 0.1 K/UL    ABS. IMM. GRANS. 0.0 0.00 - 0.04 K/UL    DF AUTOMATED         Radiology review: CTA of head, spine, and XR of chest and knees BL    Assessment:   1. Acute UTI/urinary incontinence  2. Metabolic encephalopathy  3. Hypothyroidism  4. Essential tremor  5. Hyperlipidemia  6. Generalized weakness with multiple falls    Plan:    1. Acute urinary tract infection  UA positive  Urine culture greater than 100,000 colonies of gram-negative rods   Blood culture pending. Lactic acidosis resolved    2. Metabolic encephalopathy   CT of the head showed no evidence of acute stroke. Resolved    3. Hypothyroidism   Continue with Synthroid 75 mcg changed to 50 mcg due to low TSH  TSH 0.08  Repeat TSH in 1 month    4.  Essential tremor   Propanolol    5. Hyperlipidemia   On Lipitor    6. Generalized weakness  PT/OT    Case management for discharge planning and Rehab  CBC CMP and CK in a.m.      CODE STATUS Full     DVT prophylaxis: Lovenox  Ulcer prophylaxis: Protonix    Care Plan discussed with: Patient/Family, Nurse and        Cousin --> Karen Haver [phone]. Called and updated her. Answered all question. Concern for safety at home due to multiple falls. Total time spent with patient:>35 minutes.

## 2021-05-11 NOTE — PROGRESS NOTES
Problem: Falls - Risk of  Goal: *Absence of Falls  Description: Document Jose Alberto Aviles Fall Risk and appropriate interventions in the flowsheet. Outcome: Progressing Towards Goal  Note: Fall Risk Interventions:  Mobility Interventions: Bed/chair exit alarm, Communicate number of staff needed for ambulation/transfer, OT consult for ADLs, Patient to call before getting OOB, PT Consult for mobility concerns, PT Consult for assist device competence, Utilize walker, cane, or other assistive device, Strengthening exercises (ROM-active/passive)    Mentation Interventions: Bed/chair exit alarm, Door open when patient unattended, Increase mobility, More frequent rounding, Reorient patient, Room close to nurse's station, Toileting rounds, Update white board, Eyeglasses and hearing aids, Evaluate medications/consider consulting pharmacy    Medication Interventions: Evaluate medications/consider consulting pharmacy, Bed/chair exit alarm    Elimination Interventions: Call light in reach, Bed/chair exit alarm, Stay With Me (per policy), Toileting schedule/hourly rounds    History of Falls Interventions: Bed/chair exit alarm, Consult care management for discharge planning, Evaluate medications/consider consulting pharmacy, Investigate reason for fall, Room close to nurse's station, Utilize gait belt for transfer/ambulation, Door open when patient unattended         Problem: Patient Education: Go to Patient Education Activity  Goal: Patient/Family Education  Outcome: Progressing Towards Goal     Problem: Pressure Injury - Risk of  Goal: *Prevention of pressure injury  Description: Document Grath Scale and appropriate interventions in the flowsheet.   Outcome: Progressing Towards Goal  Note: Pressure Injury Interventions:  Sensory Interventions: Assess changes in LOC, Assess need for specialty bed, Avoid rigorous massage over bony prominences, Check visual cues for pain, Discuss PT/OT consult with provider, Float heels, Keep linens dry and wrinkle-free, Maintain/enhance activity level, Minimize linen layers, Monitor skin under medical devices, Pad between skin to skin, Pressure redistribution bed/mattress (bed type), Turn and reposition approx. every two hours (pillows and wedges if needed)    Moisture Interventions: Absorbent underpads, Apply protective barrier, creams and emollients, Check for incontinence Q2 hours and as needed, Internal/External urinary devices, Limit adult briefs, Maintain skin hydration (lotion/cream), Minimize layers, Offer toileting Q_hr, Moisture barrier    Activity Interventions: Pressure redistribution bed/mattress(bed type), PT/OT evaluation    Mobility Interventions: Float heels, HOB 30 degrees or less, Pressure redistribution bed/mattress (bed type), PT/OT evaluation, Turn and reposition approx.  every two hours(pillow and wedges), Assess need for specialty bed    Nutrition Interventions: Document food/fluid/supplement intake, Discuss nutritional consult with provider, Offer support with meals,snacks and hydration    Friction and Shear Interventions: Apply protective barrier, creams and emollients, HOB 30 degrees or less, Minimize layers, Lift sheet                Problem: Patient Education: Go to Patient Education Activity  Goal: Patient/Family Education  Outcome: Progressing Towards Goal

## 2021-05-12 LAB
ALBUMIN SERPL-MCNC: 2.8 G/DL (ref 3.5–5)
ALBUMIN/GLOB SERPL: 0.9 {RATIO} (ref 1.1–2.2)
ALP SERPL-CCNC: 77 U/L (ref 45–117)
ALT SERPL-CCNC: 32 U/L (ref 12–78)
ANION GAP SERPL CALC-SCNC: 6 MMOL/L (ref 5–15)
AST SERPL W P-5'-P-CCNC: 33 U/L (ref 15–37)
BACTERIA SPEC CULT: ABNORMAL
BASOPHILS # BLD: 0.1 K/UL (ref 0–0.1)
BASOPHILS NFR BLD: 1 % (ref 0–1)
BILIRUB SERPL-MCNC: 0.4 MG/DL (ref 0.2–1)
BUN SERPL-MCNC: 9 MG/DL (ref 6–20)
BUN/CREAT SERPL: 13 (ref 12–20)
CA-I BLD-MCNC: 9 MG/DL (ref 8.5–10.1)
CHLORIDE SERPL-SCNC: 113 MMOL/L (ref 97–108)
CK SERPL-CCNC: 167 U/L (ref 26–192)
CO2 SERPL-SCNC: 23 MMOL/L (ref 21–32)
COLONY COUNT,CNT: ABNORMAL
CREAT SERPL-MCNC: 0.7 MG/DL (ref 0.55–1.02)
DIFFERENTIAL METHOD BLD: NORMAL
EOSINOPHIL # BLD: 0.4 K/UL (ref 0–0.4)
EOSINOPHIL NFR BLD: 7 % (ref 0–7)
ERYTHROCYTE [DISTWIDTH] IN BLOOD BY AUTOMATED COUNT: 13.4 % (ref 11.5–14.5)
GLOBULIN SER CALC-MCNC: 3.1 G/DL (ref 2–4)
GLUCOSE SERPL-MCNC: 74 MG/DL (ref 65–100)
HCT VFR BLD AUTO: 36.1 % (ref 35–47)
HGB BLD-MCNC: 12 G/DL (ref 11.5–16)
IMM GRANULOCYTES # BLD AUTO: 0 K/UL (ref 0–0.04)
IMM GRANULOCYTES NFR BLD AUTO: 0 % (ref 0–0.5)
LYMPHOCYTES # BLD: 2.2 K/UL (ref 0.8–3.5)
LYMPHOCYTES NFR BLD: 43 % (ref 12–49)
MCH RBC QN AUTO: 31.6 PG (ref 26–34)
MCHC RBC AUTO-ENTMCNC: 33.2 G/DL (ref 30–36.5)
MCV RBC AUTO: 95 FL (ref 80–99)
MONOCYTES # BLD: 0.4 K/UL (ref 0–1)
MONOCYTES NFR BLD: 8 % (ref 5–13)
NEUTS SEG # BLD: 2.1 K/UL (ref 1.8–8)
NEUTS SEG NFR BLD: 41 % (ref 32–75)
NRBC # BLD: 0 K/UL (ref 0–0.01)
NRBC BLD-RTO: 0 PER 100 WBC
PLATELET # BLD AUTO: 227 K/UL (ref 150–400)
PMV BLD AUTO: 10 FL (ref 8.9–12.9)
POTASSIUM SERPL-SCNC: 4.1 MMOL/L (ref 3.5–5.1)
PROT SERPL-MCNC: 5.9 G/DL (ref 6.4–8.2)
RBC # BLD AUTO: 3.8 M/UL (ref 3.8–5.2)
SODIUM SERPL-SCNC: 142 MMOL/L (ref 136–145)
SPECIAL REQUESTS,SREQ: ABNORMAL
WBC # BLD AUTO: 5.1 K/UL (ref 3.6–11)

## 2021-05-12 PROCEDURE — 85025 COMPLETE CBC W/AUTO DIFF WBC: CPT

## 2021-05-12 PROCEDURE — 65270000029 HC RM PRIVATE

## 2021-05-12 PROCEDURE — 74011250637 HC RX REV CODE- 250/637: Performed by: FAMILY MEDICINE

## 2021-05-12 PROCEDURE — 80053 COMPREHEN METABOLIC PANEL: CPT

## 2021-05-12 PROCEDURE — 97530 THERAPEUTIC ACTIVITIES: CPT

## 2021-05-12 PROCEDURE — 74011250636 HC RX REV CODE- 250/636: Performed by: PHYSICIAN ASSISTANT

## 2021-05-12 PROCEDURE — 74011250637 HC RX REV CODE- 250/637: Performed by: PHYSICIAN ASSISTANT

## 2021-05-12 PROCEDURE — 74011000250 HC RX REV CODE- 250: Performed by: PHYSICIAN ASSISTANT

## 2021-05-12 PROCEDURE — 82550 ASSAY OF CK (CPK): CPT

## 2021-05-12 PROCEDURE — 36415 COLL VENOUS BLD VENIPUNCTURE: CPT

## 2021-05-12 RX ADMIN — TROSPIUM CHLORIDE 20 MG: 20 TABLET, FILM COATED ORAL at 21:00

## 2021-05-12 RX ADMIN — PROPRANOLOL HYDROCHLORIDE 10 MG: 20 TABLET ORAL at 22:20

## 2021-05-12 RX ADMIN — ENOXAPARIN SODIUM 40 MG: 40 INJECTION SUBCUTANEOUS at 12:05

## 2021-05-12 RX ADMIN — CEFTRIAXONE SODIUM 2 G: 2 INJECTION, POWDER, FOR SOLUTION INTRAMUSCULAR; INTRAVENOUS at 10:03

## 2021-05-12 RX ADMIN — ATORVASTATIN CALCIUM 40 MG: 40 TABLET, FILM COATED ORAL at 22:20

## 2021-05-12 RX ADMIN — LEVOTHYROXINE SODIUM 50 MCG: 0.03 TABLET ORAL at 10:03

## 2021-05-12 RX ADMIN — PANTOPRAZOLE SODIUM 40 MG: 40 TABLET, DELAYED RELEASE ORAL at 07:54

## 2021-05-12 RX ADMIN — TROSPIUM CHLORIDE 20 MG: 20 TABLET, FILM COATED ORAL at 09:00

## 2021-05-12 NOTE — PROGRESS NOTES
Bedside shift change report given to Carlton Lang RN (oncoming nurse) by Elio Streeter RN (offgoing nurse). Report included the following information SBAR.

## 2021-05-12 NOTE — ROUTINE PROCESS
Bedside shift report given to Edwin Anne RN  (oncoming nurse) by Dayan Borden RN (off going nurse).  
  
Report to include SBAR, plan of care, recent results, discharge planning, and patient's questions and concerns.

## 2021-05-12 NOTE — PROGRESS NOTES
PHYSICAL THERAPY TREATMENT  Patient: Genoveva Driscoll (28 y.o. female)  Date: 5/12/2021  Diagnosis: UTI (urinary tract infection) [N39.0] <principal problem not specified>       Precautions:    Chart, physical therapy assessment, plan of care and goals were reviewed. ASSESSMENT  Patient continues with skilled PT services and is progressing towards goals. Pt. Semi supine in bed upon arrival and agreeable to Co treat with PRYOR for patient and therapist safety. Pt. Continues to have essential tremmors that appear to increase with standing and ambulation. Requires Min a X2 for ambulation to assist with AD utilization, and balance. Able to perform 5 bridges and scoot self to [facility]. Tolerated supine LE TE. Recommend DC to SNF. .     Current Level of Function Impacting Discharge (mobility/balance):[place], Coordination, endurance, strength, balance    Other factors to consider for discharge: PMH         PLAN :  Patient continues to benefit from skilled intervention to address the above impairments. Continue treatment per established plan of care. to address goals. Recommendation for discharge: (in order for the patient to meet his/her long term goals)  Therapy up to 5 days/week in SNF setting    This discharge recommendation:  Has been made in collaboration with the attending provider and/or case management    IF patient discharges home will need the following DME: rolling walker       SUBJECTIVE:   Patient stated Gayle Dollar.     OBJECTIVE DATA SUMMARY:   Critical Behavior:  Neurologic State: Alert  Orientation Level: Oriented X4  Cognition: Appropriate safety awareness, Appropriate for age attention/concentration, Follows commands     Functional Mobility Training:  Bed Mobility:  Rolling: Minimum assistance  Supine to Sit: Minimum assistance  Sit to Supine: Stand-by assistance  Scooting: Stand-by assistance        Transfers:  Sit to Stand: Minimum assistance;Assist x2  Stand to Sit: Minimum assistance;Assist x2  Stand Pivot Transfers: Minimum assistance;Assist x2                          Balance:  Sitting: Impaired; With support  Sitting - Static: Fair (occasional)  Sitting - Dynamic: Fair (occasional)  Standing: Impaired;Pull to stand; With support  Standing - Static: Fair;Constant support  Standing - Dynamic : Fair;Constant support  Ambulation/Gait Training:  Distance (ft): 40 Feet (ft)  Assistive Device: Walker, rolling;Gait belt  Ambulation - Level of Assistance: Minimal assistance;Assist x2                 Base of Support: Narrowed                             Stairs: Therapeutic Exercises  5 BRIDGE  5SLR  5HEEL SLIDE  Pain Ratin    Activity Tolerance:   Fair  Please refer to the flowsheet for vital signs taken during this treatment. After treatment patient left in no apparent distress:   Supine in bed    COMMUNICATION/COLLABORATION:   The patients plan of care was discussed with: Physical therapy assistant.      Katerina Umaña PTA   Time Calculation: 30 mins

## 2021-05-12 NOTE — PROGRESS NOTES
Notified Dr. Isha Escobar via perfect serve that patient IV accidentally removed, patient refused restart.

## 2021-05-12 NOTE — PROGRESS NOTES
Reason for Admission:  UTI                     RUR Score:  9%                   Plan for utilizing home health:      No    PCP: First and Last name:  Lucy Mayes MD     Name of Practice:    Are you a current patient: Yes/No: Yes   Approximate date of last visit:    Can you participate in a virtual visit with your PCP:                     Current Advanced Directive/Advance Care Plan: Full Code      Healthcare Decision Maker:   Susan Engel (cousin) [phone] (cell) / [phone] (home)  Click here to complete [employer] including selection of the Healthcare Decision Maker Relationship (ie \"Primary\")                             Transition of Care Plan:      SNF                Cm met with pt at the bedside to complete discharge assessment. Pt was alert and oriented. Cm verified home address and emergency contact - [facility] [employer]. Pt reports living at home alone. Pt has a walker. PT is recommending SNF. CM dicussed and educated SNF. Pt is agreeable to going to SNF for rehab. Choice obtained for [facility]. Cm informed pt we are anticipating discharge tomorrow. Cm presented and discussed IMM. CM informed pt writer will contact her cousin - [facility] so she is aware of dc plan. Pt was agreeable. Cm attempted to contact [facility] [phone] with no answer. Cm called another number [phone]. Pt's dc plan was discussed. Cm informed her writer will send a referral to [facility]. At this time, pt will go to [facility] for short term only. [facility] indicated she does grocery shopping of pt once a week and she takes her to her appointments. Cm informed [facility] we are anticipating discharge for tomorrow, but Cm will call her to confirm discharge. Referral made via [facility] to [facility].

## 2021-05-12 NOTE — PROGRESS NOTES
Problem: Self Care Deficits Care Plan (Adult)  Goal: *Acute Goals and Plan of Care (Insert Text)  Description: Pt will be MI sup<->sit in prep for EOB ADL's  Pt will be MI  LB dressing EOB level  Pt will be MI  sit EOB 10 minutes in prep for EOB ADL's  Pt will be MI  grooming EOB level  Pt will be MI  sit<-> prep for toilet transfer  Pt will be MI  toilet transfer with LRAD  Pt will be MI  toileting/cloth mgmt LRAD  Pt will be MI  grooming standing sink  Pt will be Mi bathing sitting/standing sink LRAD  Pt will be MI wilber UE HEP in prep for self care tasks      Outcome: Progressing Towards Goal   OCCUPATIONAL THERAPY TREATMENT  Patient: Genoveva Driscoll (89 y.o. female)  Date: 5/12/2021  Diagnosis: UTI (urinary tract infection) [N39.0] <principal problem not specified>       Precautions: FALL  Chart, occupational therapy assessment, plan of care, and goals were reviewed. ASSESSMENT  Patient continues with skilled OT services and is progressing towards goals. Patient received semi supine in bed upon PRYOR/PTA arrival and agreeable for co-tx for safety. Min A for bed mobility, supine to sit EOB, SBA sit to supine and scooting ( bridging/using bed rail). Long sitting  to don wilber socks with SBA /set-up. Seated EOB, patient able to perform simple grooming task with SBA and set-up. Patient heart monitor was not operating correctly and PTA went to notify nursing which replaced batteries. Patient's HR 99 to 105 during activities. STS min a x2 , min A x2 2/2 tremors throughout body for bed<->toilet transfer using RW with vc's for correct technique and increased time. Patient educated on and completed bilateral UE HEP in prep for self care task and strength for OOB ADL'S, see grid below. Patient would benefit from continued skilled Occupational services while at [facility] in order to increase safety and independence with self care and functional tranfers/mobility.  Recommend at discharge to SNF when medically appropriate. Current Level of Function Impacting Discharge (ADLs): MIN A x2 for functional tf's and SBA set-up for grooming    Other factors to consider for discharge: Time of onset, medical prognosis/diagnosis, severity of deficits, PLOF, home environment, and family support          PLAN :  Patient continues to benefit from skilled intervention to address the above impairments. Continue treatment per established plan of care. to address goals. Recommend with staff: EOB for simple grooming with supervision    Recommend next OT session: functional transfer     Recommendation for discharge: (in order for the patient to meet his/her long term goals)  Therapy up to 5 days/week in SNF setting    This discharge recommendation:  Has been made in collaboration with the attending provider and/or case management    IF patient discharges home will need the following DME: TBD       SUBJECTIVE:   Patient stated You want me to walk that far    OBJECTIVE DATA SUMMARY:   Cognitive/Behavioral Status:  Neurologic State: Alert  Orientation Level: Oriented X4  Cognition: Appropriate decision making; Appropriate for age attention/concentration; Appropriate safety awareness; Follows commands             Functional Mobility and Transfers for ADLs:  Bed Mobility:  Rolling: Minimum assistance  Supine to Sit: Minimum assistance  Sit to Supine: Stand-by assistance  Scooting: Stand-by assistance    Transfers:  Sit to Stand: Minimum assistance;Assist x2  Functional Transfers  Toilet Transfer : Minimum assistance;Assist x2  Cues: Verbal cues provided  Adaptive Equipment: Grab bars; Walker (comment)       Balance:  Sitting: Impaired; With support  Sitting - Static: Fair (occasional)  Sitting - Dynamic: Fair (occasional)  Standing: Impaired;Pull to stand; With support  Standing - Static: Fair;Constant support  Standing - Dynamic : Fair;Constant support    ADL Intervention:       Grooming  Grooming Assistance: Set-up; Stand-by assistance  Position Performed: Seated edge of bed  Brushing/Combing Hair: Stand-by assistance;Set-up      Lower Body Dressing Assistance  Dressing Assistance: Set-up; Stand-by assistance  Socks: Set-up; Stand-by assistance  Position Performed: Long sitting on bed              Therapeutic Exercises:   Exercise Sets Reps AROM AAROM PROM Self PROM Comments   Shoulder flex/ext 1 10 [x] [] [] [] Semi supine in bed [facility]   Elbow flex/ext 1 10 [x] [] [] [] WFL   Digit flex/ext 1 10 [x] [] [] [] WFL      [] [] [] []         Pain:  0/10    Activity Tolerance:   Fair  Please refer to the flowsheet for vital signs taken during this treatment. After treatment patient left in no apparent distress:   Supine in bed, Call bell within reach, Bed / chair alarm activated, and Side rails x 3    COMMUNICATION/COLLABORATION:   The patients plan of care was discussed with: Physical therapy assistant and Registered nurse.      KONSTANTIN Rey  Time Calculation: 30 mins

## 2021-05-12 NOTE — PROGRESS NOTES
Physician Progress Note      Man Boggs  CSN #:                  [id number]  :                       1945  ADMIT DATE:       2021 9:35 PM  DISCH DATE:  RESPONDING  PROVIDER #:        LUIS MURDOCK PA-C          QUERY TEXT:    Pt admitted with SEPSIS. Pt noted to have clinical indicators of rhabdomyolysis i.e. hematuria, elevated CK, myoglobin. If possible, please document in progress notes and discharge summary if you are evaluating and/or treating any of the following: The medical record reflects the following:  Risk Factors: FALL, CONFUSION, ESSENTIAL TREMORS, SEPSIS, UTI  Clinical Indicators: ER PN: \"This morning neighbor found patient on the floor. Patient refused to come to emergency department. Later on today patient was found by cousin on the floor and appears to be more confused\" CK: 1,109. URINE: MODERATE BLOOD, RBC: 0-5. Treatment: NS 1L BOLUS, HEAD CT, XR CHEST & KNEES, LAB MONITORING. Thank you,  PAN Bo, RN, CDI Specialist  [phone] or [email or web address]      Per [email or web address]  Traumatic rhabdomyolysis cause examples: crush syndrome, prolonged immobilization  Nontraumatic rhabdomyolysis cause examples:  marked exertion, hyperthermia, metabolic myopathy, drugs or toxins, infections, electrolyte disorders. Options provided:  -- Traumatic rhabdomyolysis  -- Nontraumatic rhabdomyolysis  -- Rhabdomyolysis ruled out  -- Other - I will add my own diagnosis  -- Disagree - Not applicable / Not valid  -- Disagree - Clinically unable to determine / Unknown  -- Refer to Clinical Documentation Reviewer    PROVIDER RESPONSE TEXT:    This patient has nontraumatic rhabdomyolysis.     Query created by: Rosario Patel on 2021 2:56 PM      Electronically signed by:  Maryjane Robledo PA-C 2021 9:57 AM

## 2021-05-12 NOTE — PROGRESS NOTES
Hospitalist Progress Note    Subjective:   Daily Progress Note: 5/12/2021     Macho Law is a 77 y.o. female with past medical history of essential tremors, hyperlipidemia, hypothyroidism and urinary incontinence presenting to the ER from home for evaluation of confusion and multiple falls. She gives a history of essential tremors and requiring a walker to assist with ambulation over the past several years. In  the ER, temperature is 97.6 °F, blood pressure 170/65, pulse 76, respirations 17 and oxygen saturation 98% on room air. Stroke alert was called by EMS. Neurologist was consulted via telemedicine. CTA of the head reports normal CT angiogram of the brain. CT of the C-spine had no evidence of acute fracture or injury. Neurologist did not recommend TPA. Abnormal laboratory work includes white blood cell count 12.2, lactic acid 5.6 and urinalysis was positive for nitrites, leukocyte esterase, bacteria and white blood cells. Chest x-ray had no evidence of acute cardiopulmonary process. X-ray of the left and right knee have no evidence of acute fractures. Aspirin,  and potassium chloride was administered in the ER. Started on IV rocephin. PT/OT consulted. Urine culture positive for E. coli. Sensitive to Rocephin. PT OT for rehab facilities. Authorization pending      Subjective: No acute issues overnight.  Diarrhea resolved    Current Facility-Administered Medications   Medication Dose Route Frequency    levothyroxine (SYNTHROID) tablet 50 mcg  50 mcg Oral DAILY    enoxaparin (LOVENOX) injection 40 mg  40 mg SubCUTAneous Q24H    benzocaine-menthoL (CEPACOL) lozenge 1 Lozenge  1 Lozenge Mucous Membrane PRN    acetaminophen (TYLENOL) tablet 650 mg  650 mg Oral Q6H PRN    Or    acetaminophen (TYLENOL) suppository 650 mg  650 mg Rectal Q6H PRN    polyethylene glycol (MIRALAX) packet 17 g  17 g Oral DAILY PRN    promethazine (PHENERGAN) tablet 12.5 mg  12.5 mg Oral Q6H PRN    Or    ondansetron (ZOFRAN) injection 4 mg  4 mg IntraVENous Q6H PRN    atorvastatin (LIPITOR) tablet 40 mg  40 mg Oral QHS    trospium (SANCTURA) tablet 20 mg  20 mg Oral BID    propranoloL (INDERAL) tablet 10 mg  10 mg Oral BID    pantoprazole (PROTONIX) tablet 40 mg  40 mg Oral ACB    cefTRIAXone (ROCEPHIN) 2 g in sterile water (preservative free) 20 mL IV syringe  2 g IntraVENous Q24H    loperamide (IMODIUM) capsule 4 mg  4 mg Oral Q6H PRN        Review of Systems  Constitutional: No fevers, No chills, No sweats, + fatigue, ++Weakness  Eyes: No redness  Ears, nose, mouth, throat, and face: No nasal congestion, No sore throat, No voice change  Respiratory: No Shortness of Breath, No cough, No wheezing  Cardiovascular: No chest pain, No palpitations, No extremity edema  Gastrointestinal: No nausea, No vomiting, No diarrhea, No abdominal pain  Genitourinary: No frequency, No dysuria, No hematuria  Integument/breast: No skin lesion(s)   Neurological: No Confusion, No headaches, No dizziness      Objective:     Visit Vitals  BP (!) 102/56 (BP 1 Location: Left arm, BP Patient Position: At rest)   Pulse 78   Temp 97.5 °F (36.4 °C)   Resp 20   Ht 5' 2\" (1.575 m)   Wt 56.2 kg (124 lb)   SpO2 96%   Breastfeeding No   BMI 22.68 kg/m²      O2 Device: None (Room air)    Temp (24hrs), Av.5 °F (36.4 °C), Min:97.5 °F (36.4 °C), Max:97.5 °F (36.4 °C)      701 -  1900  In: -   Out: 700 [Urine:700]  05/10 1901 -  0700  In: 480 [P.O.:480]  Out: 775 [Urine:775]    PHYSICAL EXAM:  Constitutional: elderly appearing, No acute distress  Skin: Extremities and face reveal no rashes. HEENT: Sclerae anicteric. Extra-occular muscles are intact. No oral ulcers. The neck is supple and no masses. Cardiovascular: Regular rate and rhythm. Respiratory:  Clear breath sounds bilaterally with no wheezes, rales, or rhonchi. GI: Abdomen nondistended, soft, and nontender. Normal active bowel sounds.   Musculoskeletal: No pitting edema of the lower legs. Able to move all ext  Neurological:  Patient is alert and oriented. Cranial nerves II-XII grossly intact  Psychiatric: Mood appears appropriate       Data Review    Recent Results (from the past 24 hour(s))   CBC WITH AUTOMATED DIFF    Collection Time: 05/12/21  6:06 AM   Result Value Ref Range    WBC 5.1 3.6 - 11.0 K/uL    RBC 3.80 3.80 - 5.20 M/uL    HGB 12.0 11.5 - 16.0 g/dL    HCT 36.1 35.0 - 47.0 %    MCV 95.0 80.0 - 99.0 FL    MCH 31.6 26.0 - 34.0 PG    MCHC 33.2 30.0 - 36.5 g/dL    RDW 13.4 11.5 - 14.5 %    PLATELET 605 466 - 135 K/uL    MPV 10.0 8.9 - 12.9 FL    NRBC 0.0 0.0  WBC    ABSOLUTE NRBC 0.00 0.00 - 0.01 K/uL    NEUTROPHILS 41 32 - 75 %    LYMPHOCYTES 43 12 - 49 %    MONOCYTES 8 5 - 13 %    EOSINOPHILS 7 0 - 7 %    BASOPHILS 1 0 - 1 %    IMMATURE GRANULOCYTES 0 0 - 0.5 %    ABS. NEUTROPHILS 2.1 1.8 - 8.0 K/UL    ABS. LYMPHOCYTES 2.2 0.8 - 3.5 K/UL    ABS. MONOCYTES 0.4 0.0 - 1.0 K/UL    ABS. EOSINOPHILS 0.4 0.0 - 0.4 K/UL    ABS. BASOPHILS 0.1 0.0 - 0.1 K/UL    ABS. IMM. GRANS. 0.0 0.00 - 0.04 K/UL    DF AUTOMATED     METABOLIC PANEL, COMPREHENSIVE    Collection Time: 05/12/21  6:06 AM   Result Value Ref Range    Sodium 142 136 - 145 mmol/L    Potassium 4.1 3.5 - 5.1 mmol/L    Chloride 113 (H) 97 - 108 mmol/L    CO2 23 21 - 32 mmol/L    Anion gap 6 5 - 15 mmol/L    Glucose 74 65 - 100 mg/dL    BUN 9 6 - 20 mg/dL    Creatinine 0.70 0.55 - 1.02 mg/dL    BUN/Creatinine ratio 13 12 - 20      GFR est AA >60 >60 ml/min/1.73m2    GFR est non-AA >60 >60 ml/min/1.73m2    Calcium 9.0 8.5 - 10.1 mg/dL    Bilirubin, total 0.4 0.2 - 1.0 mg/dL    AST (SGOT) 33 15 - 37 U/L    ALT (SGPT) 32 12 - 78 U/L    Alk.  phosphatase 77 45 - 117 U/L    Protein, total 5.9 (L) 6.4 - 8.2 g/dL    Albumin 2.8 (L) 3.5 - 5.0 g/dL    Globulin 3.1 2.0 - 4.0 g/dL    A-G Ratio 0.9 (L) 1.1 - 2.2     CK    Collection Time: 05/12/21  6:06 AM   Result Value Ref Range     26 - 192 U/L Radiology review: CTA of head, spine, and XR of chest and knees BL    Assessment:   1. Acute UTI/urinary incontinence  2. Metabolic encephalopathy  3. Hypothyroidism  4. Essential tremor  5. Hyperlipidemia  6. Generalized weakness with multiple falls    Plan:    1. Acute urinary tract infection  UA positive  Urine culture greater than 100,000 colonies of gram-negative rods, E coli   Blood culture no growth. Lactic acidosis resolved  Continue Rocephin, Cipro at time of discharge    2. Metabolic encephalopathy   CT of the head showed no evidence of acute stroke. Resolved    3. Hypothyroidism   Continue with Synthroid 75 mcg changed to 50 mcg due to low TSH  TSH 0.08  Repeat TSH in 1 month    4.  Essential tremor   Propanolol    5. Hyperlipidemia   On Lipitor    6. Generalized weakness  PT/OT    7. Rhabdomyolysis  CK greater than 2000, trended to normal  Resolved    Case management for discharge planning and Rehab  CBC CMP  in a.m. CODE STATUS Full     DVT prophylaxis: Lovenox  Ulcer prophylaxis: Protonix    Care Plan discussed with: Patient/Family, Nurse and        Cousin --> Vijay Mccollum [phone]. Called and updated her. Answered all question. Concern for safety at home due to multiple falls. Total time spent with patient:>35 minutes.

## 2021-05-13 VITALS
SYSTOLIC BLOOD PRESSURE: 108 MMHG | TEMPERATURE: 98.2 F | DIASTOLIC BLOOD PRESSURE: 60 MMHG | HEART RATE: 76 BPM | OXYGEN SATURATION: 97 % | BODY MASS INDEX: 22.82 KG/M2 | WEIGHT: 124 LBS | RESPIRATION RATE: 20 BRPM | HEIGHT: 62 IN

## 2021-05-13 PROBLEM — A41.9 SEPSIS (HCC): Status: ACTIVE | Noted: 2021-05-13

## 2021-05-13 PROBLEM — R29.6 FALLS FREQUENTLY: Status: ACTIVE | Noted: 2021-05-13

## 2021-05-13 PROBLEM — N39.0 COMPLICATED UTI (URINARY TRACT INFECTION): Status: ACTIVE | Noted: 2021-05-13

## 2021-05-13 PROBLEM — E87.20 LACTIC ACIDOSIS: Status: ACTIVE | Noted: 2021-05-13

## 2021-05-13 PROBLEM — M62.82 RHABDOMYOLYSIS: Status: ACTIVE | Noted: 2021-05-13

## 2021-05-13 PROBLEM — G93.41 SEPTIC ENCEPHALOPATHY: Status: ACTIVE | Noted: 2021-05-13

## 2021-05-13 LAB
ALBUMIN SERPL-MCNC: 2.8 G/DL (ref 3.5–5)
ALBUMIN/GLOB SERPL: 0.9 {RATIO} (ref 1.1–2.2)
ALP SERPL-CCNC: 83 U/L (ref 45–117)
ALT SERPL-CCNC: 30 U/L (ref 12–78)
ANION GAP SERPL CALC-SCNC: 6 MMOL/L (ref 5–15)
AST SERPL W P-5'-P-CCNC: 26 U/L (ref 15–37)
BASOPHILS # BLD: 0.1 K/UL (ref 0–0.1)
BASOPHILS NFR BLD: 1 % (ref 0–1)
BILIRUB SERPL-MCNC: 0.5 MG/DL (ref 0.2–1)
BUN SERPL-MCNC: 11 MG/DL (ref 6–20)
BUN/CREAT SERPL: 14 (ref 12–20)
CA-I BLD-MCNC: 9.1 MG/DL (ref 8.5–10.1)
CHLORIDE SERPL-SCNC: 113 MMOL/L (ref 97–108)
CO2 SERPL-SCNC: 22 MMOL/L (ref 21–32)
CREAT SERPL-MCNC: 0.76 MG/DL (ref 0.55–1.02)
DIFFERENTIAL METHOD BLD: NORMAL
EOSINOPHIL # BLD: 0.3 K/UL (ref 0–0.4)
EOSINOPHIL NFR BLD: 5 % (ref 0–7)
ERYTHROCYTE [DISTWIDTH] IN BLOOD BY AUTOMATED COUNT: 13.1 % (ref 11.5–14.5)
GLOBULIN SER CALC-MCNC: 3.2 G/DL (ref 2–4)
GLUCOSE SERPL-MCNC: 81 MG/DL (ref 65–100)
HCT VFR BLD AUTO: 36.4 % (ref 35–47)
HGB BLD-MCNC: 12.3 G/DL (ref 11.5–16)
IMM GRANULOCYTES # BLD AUTO: 0 K/UL (ref 0–0.04)
IMM GRANULOCYTES NFR BLD AUTO: 0 % (ref 0–0.5)
LYMPHOCYTES # BLD: 2.2 K/UL (ref 0.8–3.5)
LYMPHOCYTES NFR BLD: 34 % (ref 12–49)
MCH RBC QN AUTO: 31.9 PG (ref 26–34)
MCHC RBC AUTO-ENTMCNC: 33.8 G/DL (ref 30–36.5)
MCV RBC AUTO: 94.5 FL (ref 80–99)
MONOCYTES # BLD: 0.5 K/UL (ref 0–1)
MONOCYTES NFR BLD: 8 % (ref 5–13)
NEUTS SEG # BLD: 3.4 K/UL (ref 1.8–8)
NEUTS SEG NFR BLD: 52 % (ref 32–75)
NRBC # BLD: 0 K/UL (ref 0–0.01)
NRBC BLD-RTO: 0 PER 100 WBC
PLATELET # BLD AUTO: 266 K/UL (ref 150–400)
PMV BLD AUTO: 10 FL (ref 8.9–12.9)
POTASSIUM SERPL-SCNC: 3.8 MMOL/L (ref 3.5–5.1)
PROT SERPL-MCNC: 6 G/DL (ref 6.4–8.2)
RBC # BLD AUTO: 3.85 M/UL (ref 3.8–5.2)
SODIUM SERPL-SCNC: 141 MMOL/L (ref 136–145)
WBC # BLD AUTO: 6.5 K/UL (ref 3.6–11)

## 2021-05-13 PROCEDURE — 85025 COMPLETE CBC W/AUTO DIFF WBC: CPT

## 2021-05-13 PROCEDURE — 74011250637 HC RX REV CODE- 250/637: Performed by: FAMILY MEDICINE

## 2021-05-13 PROCEDURE — 36415 COLL VENOUS BLD VENIPUNCTURE: CPT

## 2021-05-13 PROCEDURE — 74011250637 HC RX REV CODE- 250/637: Performed by: PHYSICIAN ASSISTANT

## 2021-05-13 PROCEDURE — 80053 COMPREHEN METABOLIC PANEL: CPT

## 2021-05-13 RX ORDER — CIPROFLOXACIN 500 MG/1
500 TABLET ORAL 2 TIMES DAILY
Qty: 10 TAB | Refills: 0 | Status: SHIPPED | OUTPATIENT
Start: 2021-05-13 | End: 2022-06-02

## 2021-05-13 RX ORDER — LEVOTHYROXINE SODIUM 50 UG/1
50 TABLET ORAL DAILY
Qty: 30 TAB | Refills: 1 | Status: SHIPPED | OUTPATIENT
Start: 2021-05-13

## 2021-05-13 RX ADMIN — PANTOPRAZOLE SODIUM 40 MG: 40 TABLET, DELAYED RELEASE ORAL at 08:15

## 2021-05-13 RX ADMIN — TROSPIUM CHLORIDE 20 MG: 20 TABLET, FILM COATED ORAL at 09:00

## 2021-05-13 RX ADMIN — LEVOTHYROXINE SODIUM 50 MCG: 0.03 TABLET ORAL at 09:14

## 2021-05-13 RX ADMIN — PROPRANOLOL HYDROCHLORIDE 10 MG: 20 TABLET ORAL at 09:14

## 2021-05-13 NOTE — DISCHARGE INSTRUCTIONS
Patient Education        Sepsis: Care Instructions  Overview     Sepsis is an intense reaction to an infection. It can cause damage to the body and lead to dangerously low blood pressure. You may have inflammation across large areas of your body. It can damage tissue and even go deep into your organs. Infections that can lead to sepsis include:  · A skin infection such as from a cut. · A lung infection like pneumonia. · A kidney infection. · A gut infection such as E. coli. Sepsis is treated with antibiotics. Your doctor will try to find the infection that led to sepsis. Raven Vieira also get fluids through a vein (IV). Machines will track your vital signs, including temperature, blood pressure, breathing rate, and pulse rate. The physical and mental effects of sepsis may not be seen for several weeks after treatment. And they may last long after the infection is gone. Physical problems may include:  · Feeling weak and tired. · Feeling out of breath. · Aches and pains. · Problems with getting around. · Trouble falling asleep or staying asleep. · Dry and itchy skin, brittle nails, and hair loss. Some of these effects can lead to problems with your organs or your feet, legs, hands, or arms. Sepsis can also affect your mind and emotions. Problems may include:  · Self-doubt. · Anxiety. · Nightmares. · Depression and mood problems. · Wanting to avoid other people. · Confusion. · Flashbacks and bad memories of your illness. It's important to care for yourself and try to avoid infections. This may lower your risk of getting sepsis again. Follow-up care is a key part of your treatment and safety. Be sure to make and go to all appointments, and call your doctor if you are having problems. It's also a good idea to know your test results and keep a list of the medicines you take. How can you care for yourself at home? · Be safe with medicines. Take your medicines exactly as prescribed.  Call your doctor if you think you are having a problem with your medicine. · If your doctor prescribed antibiotics, take them as directed. Do not stop taking them just because you feel better. You need to take the full course of antibiotics. · Help prevent infections that could again lead to sepsis. ? Try to avoid colds and flu. If you must be around people who have a cold or the flu, wash your hands often. And get a flu vaccine every year. ? Ask your doctor if you need a pneumococcal vaccine (to prevent pneumonia, meningitis, and other infections). If you have had one before, ask your doctor if you need another dose. ? Clean any wounds or scrapes. · Do not smoke or use other tobacco products. When you quit smoking, you are less likely to get a cold, the flu, bronchitis, and pneumonia. If you need help quitting, talk to your doctor about stop-smoking programs and medicines. These can increase your chances of quitting for good. · Drink plenty of fluids to prevent dehydration. Choose water and other caffeine-free clear liquids until you feel better. If you have kidney, heart, or liver disease and have to limit fluids, talk with your doctor before you increase the amount of fluids you drink. · Eat a healthy diet. Include fruits, vegetables, and whole grains in your diet every day. · If your doctor recommends it, try doing some physical activity. Walking is a good choice. Bit by bit, increase the amount you walk every day. · Talk with your family and friends about your challenges. Ask for help if you need it. · Keep a journal. Writing down your thoughts and feelings can help reduce your stress. · Ask family members to fill in gaps in your memory. · Set small goals for yourself that you can reach. Reward yourself for success. When should you call for help? Call  911 anytime you think you may need emergency care. For example, call if:    · You passed out (lost consciousness).    Call your doctor now or seek immediate medical care if:    · You have symptoms such as:  ? Shortness of breath. ? Feeling very sick. ? Severe pain. ? A fast heart rate. ? Cool, pale, or clammy skin. ? Feeling confused. ? Feeling very sleepy, or you are hard to wake up.     · You are dizzy or lightheaded, or you feel like you may faint.     · You have a fever or chills. Watch closely for changes in your health, and be sure to contact your doctor if:    · You do not get better as expected. Where can you learn more? Go to http://www.gray.com/  Enter T383 in the search box to learn more about \"Sepsis: Care Instructions. \"  Current as of: September 23, 2020               Content Version: 12.8  © 2006-2021 [employer]. Care instructions adapted under license by [employer] (which disclaims liability or warranty for this information). If you have questions about a medical condition or this instruction, always ask your healthcare professional. [place] [address] any warranty or liability for your use of this information. Patient Education        Urinary Tract Infection (UTI) in Women: Care Instructions  Overview     A urinary tract infection, or UTI, is a general term for an infection anywhere between the kidneys and the urethra (where urine comes out). Most UTIs are bladder infections. They often cause pain or burning when you urinate. UTIs are caused by bacteria and can be cured with antibiotics. Be sure to complete your treatment so that the infection does not get worse. Follow-up care is a key part of your treatment and safety. Be sure to make and go to all appointments, and call your doctor if you are having problems. It's also a good idea to know your test results and keep a list of the medicines you take. How can you care for yourself at home? · Take your antibiotics as directed. Do not stop taking them just because you feel better.  You need to take the full course of antibiotics. · Drink extra water and other fluids for the next day or two. This will help make the urine less concentrated and help wash out the bacteria that are causing the infection. (If you have kidney, heart, or liver disease and have to limit fluids, talk with your doctor before you increase the amount of fluids you drink.)  · Avoid drinks that are carbonated or have caffeine. They can irritate the bladder. · Urinate often. Try to empty your bladder each time. · To relieve pain, take a hot bath or lay a heating pad set on low over your lower belly or genital area. Never go to sleep with a heating pad in place. To prevent UTIs  · Drink plenty of water each day. This helps you urinate often, which clears bacteria from your system. (If you have kidney, heart, or liver disease and have to limit fluids, talk with your doctor before you increase the amount of fluids you drink.)  · Urinate when you need to. · If you are sexually active, urinate right after you have sex. · Change sanitary pads often. · Avoid douches, bubble baths, feminine hygiene sprays, and other feminine hygiene products that have deodorants. · After going to the bathroom, wipe from front to back. When should you call for help? Call your doctor now or seek immediate medical care if:    · Symptoms such as fever, chills, nausea, or vomiting get worse or appear for the first time.     · You have new pain in your back just below your rib cage. This is called flank pain.     · There is new blood or pus in your urine.     · You have any problems with your antibiotic medicine. Watch closely for changes in your health, and be sure to contact your doctor if:    · You are not getting better after taking an antibiotic for 2 days.     · Your symptoms go away but then come back. Where can you learn more?   Go to http://www.gray.com/  Enter T653 in the search box to learn more about \"Urinary Tract Infection (UTI) in Women: Care Instructions. \"  Current as of: June 29, 2020               Content Version: 12.8  © 7568-4769 [employer]. Care instructions adapted under license by [employer] (which disclaims liability or warranty for this information). If you have questions about a medical condition or this instruction, always ask your healthcare professional. [place] [address] any warranty or liability for your use of this information. Patient Education        Sepsis: Care Instructions  Overview     Sepsis is an intense reaction to an infection. It can cause damage to the body and lead to dangerously low blood pressure. You may have inflammation across large areas of your body. It can damage tissue and even go deep into your organs. Infections that can lead to sepsis include:  · A skin infection such as from a cut. · A lung infection like pneumonia. · A kidney infection. · A gut infection such as E. coli. Sepsis is treated with antibiotics. Your doctor will try to find the infection that led to sepsis. [place] also get fluids through a vein (IV). Machines will track your vital signs, including temperature, blood pressure, breathing rate, and pulse rate. The physical and mental effects of sepsis may not be seen for several weeks after treatment. And they may last long after the infection is gone. Physical problems may include:  · Feeling weak and tired. · Feeling out of breath. · Aches and pains. · Problems with getting around. · Trouble falling asleep or staying asleep. · Dry and itchy skin, brittle nails, and hair loss. Some of these effects can lead to problems with your organs or your feet, legs, hands, or arms. Sepsis can also affect your mind and emotions. Problems may include:  · Self-doubt. · Anxiety. · Nightmares. · Depression and mood problems. · Wanting to avoid other people. · Confusion. · Flashbacks and bad memories of your illness.   It's important to care for yourself and try to avoid infections. This may lower your risk of getting sepsis again. Follow-up care is a key part of your treatment and safety. Be sure to make and go to all appointments, and call your doctor if you are having problems. It's also a good idea to know your test results and keep a list of the medicines you take. How can you care for yourself at home? · Be safe with medicines. Take your medicines exactly as prescribed. Call your doctor if you think you are having a problem with your medicine. · If your doctor prescribed antibiotics, take them as directed. Do not stop taking them just because you feel better. You need to take the full course of antibiotics. · Help prevent infections that could again lead to sepsis. ? Try to avoid colds and flu. If you must be around people who have a cold or the flu, wash your hands often. And get a flu vaccine every year. ? Ask your doctor if you need a pneumococcal vaccine (to prevent pneumonia, meningitis, and other infections). If you have had one before, ask your doctor if you need another dose. ? Clean any wounds or scrapes. · Do not smoke or use other tobacco products. When you quit smoking, you are less likely to get a cold, the flu, bronchitis, and pneumonia. If you need help quitting, talk to your doctor about stop-smoking programs and medicines. These can increase your chances of quitting for good. · Drink plenty of fluids to prevent dehydration. Choose water and other caffeine-free clear liquids until you feel better. If you have kidney, heart, or liver disease and have to limit fluids, talk with your doctor before you increase the amount of fluids you drink. · Eat a healthy diet. Include fruits, vegetables, and whole grains in your diet every day. · If your doctor recommends it, try doing some physical activity. Walking is a good choice. Bit by bit, increase the amount you walk every day.   · Talk with your family and friends about your challenges. Ask for help if you need it. · Keep a journal. Writing down your thoughts and feelings can help reduce your stress. · Ask family members to fill in gaps in your memory. · Set small goals for yourself that you can reach. Reward yourself for success. When should you call for help? Call  911 anytime you think you may need emergency care. For example, call if:    · You passed out (lost consciousness). Call your doctor now or seek immediate medical care if:    · You have symptoms such as:  ? Shortness of breath. ? Feeling very sick. ? Severe pain. ? A fast heart rate. ? Cool, pale, or clammy skin. ? Feeling confused. ? Feeling very sleepy, or you are hard to wake up.     · You are dizzy or lightheaded, or you feel like you may faint.     · You have a fever or chills. Watch closely for changes in your health, and be sure to contact your doctor if:    · You do not get better as expected. Where can you learn more? Go to http://www.gray.com/  Enter T383 in the search box to learn more about \"Sepsis: Care Instructions. \"  Current as of: September 23, 2020               Content Version: 12.8  © 2006-2021 [employer]. Care instructions adapted under license by [employer] (which disclaims liability or warranty for this information). If you have questions about a medical condition or this instruction, always ask your healthcare professional. [place] [address] any warranty or liability for your use of this information. Patient Education        Preventing Falls: Care Instructions  Your Care Instructions     Getting around your home safely can be a challenge if you have injuries or health problems that make it easy for you to fall. Loose rugs and furniture in walkways are among the dangers for many older people who have problems walking or who have poor eyesight.  People who have conditions such as arthritis, osteoporosis, or dementia also have to be careful not to fall. You can make your home safer with a few simple measures. Follow-up care is a key part of your treatment and safety. Be sure to make and go to all appointments, and call your doctor if you are having problems. It's also a good idea to know your test results and keep a list of the medicines you take. How can you care for yourself at home? Taking care of yourself  · You may get dizzy if you do not drink enough water. To prevent dehydration, drink plenty of fluids. Choose water and other caffeine-free clear liquids. If you have kidney, heart, or liver disease and have to limit fluids, talk with your doctor before you increase the amount of fluids you drink. · Exercise regularly to improve your strength, muscle tone, and balance. Walk if you can. Swimming may be a good choice if you cannot walk easily. · Have your vision and hearing checked each year or any time you notice a change. If you have trouble seeing and hearing, you might not be able to avoid objects and could lose your balance. · Know the side effects of the medicines you take. Ask your doctor or pharmacist whether the medicines you take can affect your balance. Sleeping pills or sedatives can affect your balance. · Limit the amount of alcohol you drink. Alcohol can impair your balance and other senses. · Ask your doctor whether calluses or corns on your feet need to be removed. If you wear loose-fitting shoes because of calluses or corns, you can lose your balance and fall. · Talk to your doctor if you have numbness in your feet. Preventing falls at home  · Remove raised doorway thresholds, throw rugs, and clutter. Repair loose carpet or raised areas in the floor. · Move furniture and electrical cords to keep them out of walking paths. · Use nonskid floor wax, and wipe up spills right away, especially on ceramic tile floors. · If you use a walker or cane, put rubber tips on it.  If you use crutches, clean the bottoms of them regularly with an abrasive pad, such as steel wool. · Keep your house well lit, especially Rich Kroner, and outside walkways. Use night-lights in areas such as hallways and bathrooms. Add extra light switches or use remote switches (such as switches that go on or off when you clap your hands) to make it easier to turn lights on if you have to get up during the night. · Install sturdy handrails on stairways. · Move items in your cabinets so that the things you use a lot are on the lower shelves (about waist level). · Keep a cordless phone and a flashlight with new batteries by your bed. If possible, put a phone in each of the main rooms of your house, or carry a cell phone in case you fall and cannot reach a phone. Or, you can wear a device around your neck or wrist. You push a button that sends a signal for help. · Wear low-heeled shoes that fit well and give your feet good support. Use footwear with nonskid soles. Check the heels and soles of your shoes for wear. Repair or replace worn heels or soles. · Do not wear socks without shoes on wood floors. · Walk on the grass when the sidewalks are slippery. If you live in an area that gets snow and ice in the winter, sprinkle salt on slippery steps and sidewalks. Preventing falls in the bath  · Install grab bars and nonskid mats inside and outside your shower or tub and near the toilet and sinks. · Use shower chairs and bath benches. · Use a hand-held shower head that will allow you to sit while showering. · Get into a tub or shower by putting the weaker leg in first. Get out of a tub or shower with your strong side first.  · Repair loose toilet seats and consider installing a raised toilet seat to make getting on and off the toilet easier. · Keep your bathroom door unlocked while you are in the shower. Where can you learn more?   Go to http://www.gray.com/  Enter G117 in the search box to learn more about \"Preventing Falls: Care Instructions. \"  Current as of: December 7, 2020               Content Version: 12.8  © 2006-2021 [employer]. Care instructions adapted under license by [employer] (which disclaims liability or warranty for this information). If you have questions about a medical condition or this instruction, always ask your healthcare professional. [address] any warranty or liability for your use of this information.

## 2021-05-13 NOTE — PROGRESS NOTES
Pt can discharge to [facility]. Room# 205A and report to call: [phone]. Discharge information uploaded via [employer]. Cm spoke with pt's cousin Tyson Sibley [phone]. CM informed her of discharge today to [facility]. Antohny Montero will  the patient at 2:30pm and will call the nurses station to notify she is at the hospital.     Cm updated pt's primary nurse. Discharge plan of care/case management plan validated with provider's discharge order.

## 2021-05-13 NOTE — PROGRESS NOTES
Patient assisted to bathroom complaining that she can't walk, reminded that she walked with therapy 40 ft. Pt  Shaking as she walk. Ambulated with walker and one person assistance. Voided via commode. Assisted back, requested to sit in  Recliner. Same done.

## 2021-05-13 NOTE — PROGRESS NOTES
Discharge to [facility], transported by her Cousin. off unit via wheel

## 2021-05-13 NOTE — DISCHARGE SUMMARY
Admit date: 5/9/2021   Admitting Provider: Olayinka Arango MD    Discharge date: 5/13/2021  Discharging Provider: Nabil Amanda PA-C      * Admission Diagnoses: UTI (urinary tract infection) [N39.0]    * Discharge Diagnoses:    Hospital Problems as of 5/13/2021 Never Reviewed          Codes Class Noted - Resolved POA    Lactic acidosis ICD-10-CM: E87.2  ICD-9-CM: 276.2  5/13/2021 - Present Yes        Complicated UTI (urinary tract infection) ICD-10-CM: N39.0  ICD-9-CM: 599.0  5/13/2021 - Present Yes        Rhabdomyolysis ICD-10-CM: M62.82  ICD-9-CM: 728.88  5/13/2021 - Present Yes        Falls frequently ICD-10-CM: R29.6  ICD-9-CM: V15.88  5/13/2021 - Present Unknown        Septic encephalopathy ICD-10-CM: G93.41  ICD-9-CM: 348.31  5/13/2021 - Present Unknown        Sepsis ([facility] [address] ICD-10-CM: A41.9  ICD-9-CM: 038.9, 995.91  5/13/2021 - Present Unknown        UTI (urinary tract infection) ICD-10-CM: N39.0  ICD-9-CM: 599.0  5/10/2021 - Present Yes              * Hospital Course:   Patient is a 77 y. o. female with past medical history of essential tremors, hyperlipidemia, hypothyroidism and urinary incontinence presenting to the ER from home for evaluation of confusion and multiple falls. Michelle Rangel gives a history of essential tremors and requiring a walker to assist with ambulation over the past several years.  In  the ER, temperature is 97.6 °F, blood pressure 170/65, pulse 76, respirations 17 and oxygen saturation 98% on room air.  Stroke alert was called by EMS. Neurologist was consulted via telemedicine.   CTA of the head reports normal CT angiogram of the brain.  CT of the C-spine had no evidence of acute fracture or injury.  Neurologist did not recommend TPA.  Abnormal laboratory work includes white blood cell count 12.2, lactic acid 5.6 and urinalysis was positive for nitrites, leukocyte esterase, bacteria and white blood cells.  Chest x-ray had no evidence of acute cardiopulmonary process.  X-ray of the left and right knee have no evidence of acute fractures.  Aspirin,  and potassium chloride was administered in the ER. Started on IV rocephin. PT/OT consulted. Urine culture positive for E. coli. Sensitive to Rocephin and cipro. PT OT for SNF. Patient to be discharged to [facility] skilled nursing facility on May 13, 2021. She will continue on Cipro for 5 more days for full treatment of her complicated urinary tract infection. She will follow-up with her primary care physician in 1 week. Her levothyroxine was decreased due to a low TSH consistent with oversupplementation. Her current dose is 50 mcg and she will need a TSH repeated in 1 month. Discussed discharge instructions with the patient and her cousin Edna Das via phone. New medications include levothyroxine 50 mcg daily and Cipro 500 mg twice daily for 5 days. Prescriptions were provided. * Procedures:   * No surgery found *      Consults:   None    Significant Diagnostic Studies: As discussed in hospital course    Discharge Exam:  Visit Vitals  /60 (BP 1 Location: Right upper arm, BP Patient Position: At rest;Supine)   Pulse 76   Temp 98.2 °F (36.8 °C)   Resp 20   Ht 5' 2\" (1.575 m)   Wt 56.2 kg (124 lb)   SpO2 97%   Breastfeeding No   BMI 22.68 kg/m²     PHYSICAL EXAM:  Constitutional: Alert in no acute distress   HEENT: Sclerae anicteric, The neck is supple. Cardiovascular: Regular rate and rhythm. No murmurs, gallops, or rubs. [place] Respiratory: Clear breath sounds with no wheezes, rales, or rhonchi. GI: Abdomen nondistended, soft, and nontender. Normal active bowel sounds. Rectal: Deferred   Musculoskeletal: No pitting edema of the lower legs. Extremities have good range of motion. Neurological:  Patient is alert and oriented. Cranial nerves II-XII intact, essential tremors noted  Psychiatric: Mood appears appropriate with judgement intact. Lymphatic: No cervical or supraclavicular adenopathy.    Skin: No rashes or breakdown of the skin      * Discharge Condition: stable  * Disposition: [place] ([facility])    Discharge Medications:  Current Discharge Medication List      START taking these medications    Details   ciprofloxacin HCl (Cipro) 500 mg tablet Take 1 Tab by mouth two (2) times a day. Qty: 10 Tab, Refills: 0  Start date: 5/13/2021         CONTINUE these medications which have CHANGED    Details   levothyroxine (SYNTHROID) 50 mcg tablet Take 1 Tab by mouth daily. Qty: 30 Tab, Refills: 1  Start date: 5/13/2021         CONTINUE these medications which have NOT CHANGED    Details   atorvastatin (LIPITOR) 40 mg tablet Take 40 mg by mouth nightly. Detrol LA 2 mg ER capsule Take 2 mg by mouth daily. propranoloL (INDERAL) 10 mg tablet Take 10 mg by mouth two (2) times a day. * Follow-up Care/Patient Instructions:   Activity: Activity as tolerated  Diet: Cardiac Diet  Wound Care: None needed    Follow-up Information     Follow up With Specialties Details Why Contact Info    Teri Campbell MD Internal Medicine In 1 week  [facility]  [phone]            Discharge summary greater than 35 minutes spent with the patient performing discharge instructions, medication review and physical exam    Signed:  Mecca Conteh PA-C  5/13/2021  8:54 AM

## 2021-05-13 NOTE — PROGRESS NOTES
Bedside shift report given to Oriana Chao RN  (oncoming nurse) by Dayan Borden RN (off going nurse).      Report to include SBAR, plan of care, recent results, discharge planning, and patient's questions and concerns

## 2021-05-13 NOTE — PROGRESS NOTES
Discharge plan of care/case management plan validated with provider discharge order. Report called to [place] LPN. Including SBAR MED, e-script.

## 2021-05-16 LAB
BACTERIA SPEC CULT: NORMAL
SPECIAL REQUESTS,SREQ: NORMAL

## 2022-03-18 PROBLEM — N39.0 UTI (URINARY TRACT INFECTION): Status: ACTIVE | Noted: 2021-05-10

## 2022-03-19 PROBLEM — A41.9 SEPSIS (HCC): Status: ACTIVE | Noted: 2021-05-13

## 2022-03-19 PROBLEM — E87.20 LACTIC ACIDOSIS: Status: ACTIVE | Noted: 2021-05-13

## 2022-03-19 PROBLEM — M62.82 RHABDOMYOLYSIS: Status: ACTIVE | Noted: 2021-05-13

## 2022-03-19 PROBLEM — R29.6 FALLS FREQUENTLY: Status: ACTIVE | Noted: 2021-05-13

## 2022-03-20 PROBLEM — G93.41 SEPTIC ENCEPHALOPATHY: Status: ACTIVE | Noted: 2021-05-13

## 2022-03-20 PROBLEM — N39.0 COMPLICATED UTI (URINARY TRACT INFECTION): Status: ACTIVE | Noted: 2021-05-13

## 2022-03-30 ENCOUNTER — TRANSCRIBE ORDER (OUTPATIENT)
Dept: SCHEDULING | Age: 77
End: 2022-03-30

## 2022-03-30 DIAGNOSIS — C50.912 MALIGNANT NEOPLASM OF LEFT BREAST (HCC): Primary | ICD-10-CM

## 2022-04-01 ENCOUNTER — TRANSCRIBE ORDER (OUTPATIENT)
Dept: SCHEDULING | Age: 77
End: 2022-04-01

## 2022-04-01 DIAGNOSIS — Z51.11 ENCOUNTER FOR ANTINEOPLASTIC CHEMOTHERAPY: ICD-10-CM

## 2022-04-01 DIAGNOSIS — Z51.89 ENCOUNTER FOR VOCATIONAL THERAPY: ICD-10-CM

## 2022-04-01 DIAGNOSIS — Z01.89 RADIOLOGICAL EXAMINATION, NOT ELSEWHERE CLASSIFIED: ICD-10-CM

## 2022-04-01 DIAGNOSIS — C50.912 MALIGNANT NEOPLASM OF LEFT BREAST (HCC): Primary | ICD-10-CM

## 2022-04-15 ENCOUNTER — HOSPITAL ENCOUNTER (OUTPATIENT)
Dept: NON INVASIVE DIAGNOSTICS | Age: 77
Discharge: HOME | End: 2022-04-15
Attending: INTERNAL MEDICINE
Payer: MEDICARE

## 2022-04-15 DIAGNOSIS — Z51.89 ENCOUNTER FOR VOCATIONAL THERAPY: ICD-10-CM

## 2022-04-15 DIAGNOSIS — Z01.89 RADIOLOGICAL EXAMINATION, NOT ELSEWHERE CLASSIFIED: ICD-10-CM

## 2022-04-15 DIAGNOSIS — Z51.11 ENCOUNTER FOR ANTINEOPLASTIC CHEMOTHERAPY: ICD-10-CM

## 2022-04-15 DIAGNOSIS — C50.912 MALIGNANT NEOPLASM OF LEFT BREAST (HCC): ICD-10-CM

## 2022-04-15 LAB
ECHO AO ROOT DIAM: 3.2 CM
ECHO AV PEAK GRADIENT: 5 MMHG
ECHO AV PEAK VELOCITY: 1.1 M/S
ECHO AV VELOCITY RATIO: 0.73
ECHO EST RA PRESSURE: 3 MMHG
ECHO LA DIAMETER: 3.3 CM
ECHO LA TO AORTIC ROOT RATIO: 1.03
ECHO LV E' LATERAL VELOCITY: 8 CM/S
ECHO LV E' SEPTAL VELOCITY: 7 CM/S
ECHO LV EJECTION FRACTION BIPLANE: 67 % (ref 55–100)
ECHO LV FRACTIONAL SHORTENING: 39 % (ref 28–44)
ECHO LV INTERNAL DIMENSION DIASTOLIC: 4.4 CM (ref 3.9–5.3)
ECHO LV INTERNAL DIMENSION SYSTOLIC: 2.7 CM
ECHO LV IVSD: 0.9 CM (ref 0.6–0.9)
ECHO LV MASS 2D: 137.8 G (ref 67–162)
ECHO LV POSTERIOR WALL DIASTOLIC: 1 CM (ref 0.6–0.9)
ECHO LV RELATIVE WALL THICKNESS RATIO: 0.45
ECHO LVOT PEAK GRADIENT: 3 MMHG
ECHO LVOT PEAK VELOCITY: 0.8 M/S
ECHO MV A VELOCITY: 0.62 M/S
ECHO MV E DECELERATION TIME (DT): 229 MS
ECHO MV E VELOCITY: 0.63 M/S
ECHO MV E/A RATIO: 1.02
ECHO MV E/E' LATERAL: 7.88
ECHO MV E/E' RATIO (AVERAGED): 8.44
ECHO MV E/E' SEPTAL: 9
ECHO MV REGURGITANT PEAK GRADIENT: 64 MMHG
ECHO MV REGURGITANT PEAK VELOCITY: 4 M/S
ECHO PV MAX VELOCITY: 0.8 M/S
ECHO PV PEAK GRADIENT: 2 MMHG
ECHO RIGHT VENTRICULAR SYSTOLIC PRESSURE (RVSP): 23 MMHG
ECHO RV INTERNAL DIMENSION: 1.9 CM
ECHO TV REGURGITANT MAX VELOCITY: 2.26 M/S
ECHO TV REGURGITANT PEAK GRADIENT: 20 MMHG

## 2022-04-15 PROCEDURE — 93325 DOPPLER ECHO COLOR FLOW MAPG: CPT

## 2022-06-02 ENCOUNTER — APPOINTMENT (OUTPATIENT)
Dept: CT IMAGING | Age: 77
DRG: 565 | End: 2022-06-02
Attending: STUDENT IN AN ORGANIZED HEALTH CARE EDUCATION/TRAINING PROGRAM
Payer: MEDICARE

## 2022-06-02 ENCOUNTER — HOSPITAL ENCOUNTER (INPATIENT)
Age: 77
LOS: 4 days | Discharge: NURSING, REHAB OR LONG-TERM CARE | DRG: 565 | End: 2022-06-06
Attending: STUDENT IN AN ORGANIZED HEALTH CARE EDUCATION/TRAINING PROGRAM | Admitting: INTERNAL MEDICINE
Payer: MEDICARE

## 2022-06-02 DIAGNOSIS — N39.0 URINARY TRACT INFECTION WITH HEMATURIA, SITE UNSPECIFIED: ICD-10-CM

## 2022-06-02 DIAGNOSIS — R31.9 URINARY TRACT INFECTION WITH HEMATURIA, SITE UNSPECIFIED: ICD-10-CM

## 2022-06-02 DIAGNOSIS — N17.9 AKI (ACUTE KIDNEY INJURY) (HCC): ICD-10-CM

## 2022-06-02 DIAGNOSIS — T79.6XXA TRAUMATIC RHABDOMYOLYSIS, INITIAL ENCOUNTER (HCC): Primary | ICD-10-CM

## 2022-06-02 LAB
ALBUMIN SERPL-MCNC: 3.9 G/DL (ref 3.5–5)
ALBUMIN/GLOB SERPL: 1.3 {RATIO} (ref 1.1–2.2)
ALP SERPL-CCNC: 90 U/L (ref 45–117)
ALT SERPL-CCNC: 33 U/L (ref 12–78)
ANION GAP SERPL CALC-SCNC: 11 MMOL/L (ref 5–15)
APPEARANCE UR: ABNORMAL
AST SERPL W P-5'-P-CCNC: 55 U/L (ref 15–37)
ATRIAL RATE: 234 BPM
BACTERIA URNS QL MICRO: ABNORMAL /HPF
BASOPHILS # BLD: 0.1 K/UL (ref 0–0.1)
BASOPHILS NFR BLD: 1 % (ref 0–1)
BILIRUB SERPL-MCNC: 1.9 MG/DL (ref 0.2–1)
BILIRUB UR QL: NEGATIVE
BUN SERPL-MCNC: 18 MG/DL (ref 6–20)
BUN/CREAT SERPL: 15 (ref 12–20)
CA-I BLD-MCNC: 9.4 MG/DL (ref 8.5–10.1)
CALCULATED R AXIS, ECG10: -84 DEGREES
CALCULATED T AXIS, ECG11: 15 DEGREES
CHLORIDE SERPL-SCNC: 108 MMOL/L (ref 97–108)
CK SERPL-CCNC: 1325 U/L (ref 26–192)
CK SERPL-CCNC: 824 U/L (ref 26–192)
CO2 SERPL-SCNC: 19 MMOL/L (ref 21–32)
COLOR UR: YELLOW
CREAT SERPL-MCNC: 1.19 MG/DL (ref 0.55–1.02)
DIAGNOSIS, 93000: NORMAL
DIFFERENTIAL METHOD BLD: ABNORMAL
EOSINOPHIL # BLD: 0 K/UL (ref 0–0.4)
EOSINOPHIL NFR BLD: 0 % (ref 0–7)
ERYTHROCYTE [DISTWIDTH] IN BLOOD BY AUTOMATED COUNT: 13.1 % (ref 11.5–14.5)
GLOBULIN SER CALC-MCNC: 3 G/DL (ref 2–4)
GLUCOSE SERPL-MCNC: 113 MG/DL (ref 65–100)
GLUCOSE UR STRIP.AUTO-MCNC: NEGATIVE MG/DL
HCT VFR BLD AUTO: 40.8 % (ref 35–47)
HGB BLD-MCNC: 14.2 G/DL (ref 11.5–16)
HGB UR QL STRIP: ABNORMAL
HYALINE CASTS URNS QL MICRO: ABNORMAL /LPF (ref 0–5)
IMM GRANULOCYTES # BLD AUTO: 0.1 K/UL (ref 0–0.04)
IMM GRANULOCYTES NFR BLD AUTO: 0 % (ref 0–0.5)
KETONES UR QL STRIP.AUTO: 20 MG/DL
LEUKOCYTE ESTERASE UR QL STRIP.AUTO: ABNORMAL
LYMPHOCYTES # BLD: 1 K/UL (ref 0.8–3.5)
LYMPHOCYTES NFR BLD: 8 % (ref 12–49)
MCH RBC QN AUTO: 31.8 PG (ref 26–34)
MCHC RBC AUTO-ENTMCNC: 34.8 G/DL (ref 30–36.5)
MCV RBC AUTO: 91.5 FL (ref 80–99)
MONOCYTES # BLD: 0.8 K/UL (ref 0–1)
MONOCYTES NFR BLD: 6 % (ref 5–13)
MUCOUS THREADS URNS QL MICRO: ABNORMAL /LPF
NEUTS SEG # BLD: 11.8 K/UL (ref 1.8–8)
NEUTS SEG NFR BLD: 85 % (ref 32–75)
NITRITE UR QL STRIP.AUTO: POSITIVE
NRBC # BLD: 0 K/UL (ref 0–0.01)
NRBC BLD-RTO: 0 PER 100 WBC
PH UR STRIP: 5 [PH] (ref 5–8)
PLATELET # BLD AUTO: 301 K/UL (ref 150–400)
PMV BLD AUTO: 9.4 FL (ref 8.9–12.9)
POTASSIUM SERPL-SCNC: 4.2 MMOL/L (ref 3.5–5.1)
PROT SERPL-MCNC: 6.9 G/DL (ref 6.4–8.2)
PROT UR STRIP-MCNC: 30 MG/DL
Q-T INTERVAL, ECG07: 416 MS
QRS DURATION, ECG06: 100 MS
QTC CALCULATION (BEZET), ECG08: 497 MS
RBC # BLD AUTO: 4.46 M/UL (ref 3.8–5.2)
RBC #/AREA URNS HPF: ABNORMAL /HPF (ref 0–5)
SODIUM SERPL-SCNC: 138 MMOL/L (ref 136–145)
SP GR UR REFRACTOMETRY: 1.03 (ref 1–1.03)
TSH SERPL DL<=0.05 MIU/L-ACNC: 1.57 UIU/ML (ref 0.36–3.74)
UROBILINOGEN UR QL STRIP.AUTO: 0.1 EU/DL (ref 0.1–1)
VENTRICULAR RATE, ECG03: 86 BPM
WBC # BLD AUTO: 13.7 K/UL (ref 3.6–11)
WBC URNS QL MICRO: ABNORMAL /HPF (ref 0–4)

## 2022-06-02 PROCEDURE — 82550 ASSAY OF CK (CPK): CPT

## 2022-06-02 PROCEDURE — 70450 CT HEAD/BRAIN W/O DYE: CPT

## 2022-06-02 PROCEDURE — 81001 URINALYSIS AUTO W/SCOPE: CPT

## 2022-06-02 PROCEDURE — 84443 ASSAY THYROID STIM HORMONE: CPT

## 2022-06-02 PROCEDURE — 87086 URINE CULTURE/COLONY COUNT: CPT

## 2022-06-02 PROCEDURE — 87077 CULTURE AEROBIC IDENTIFY: CPT

## 2022-06-02 PROCEDURE — 74011250637 HC RX REV CODE- 250/637: Performed by: INTERNAL MEDICINE

## 2022-06-02 PROCEDURE — 97161 PT EVAL LOW COMPLEX 20 MIN: CPT

## 2022-06-02 PROCEDURE — 93005 ELECTROCARDIOGRAM TRACING: CPT

## 2022-06-02 PROCEDURE — 99285 EMERGENCY DEPT VISIT HI MDM: CPT

## 2022-06-02 PROCEDURE — 74011250636 HC RX REV CODE- 250/636: Performed by: INTERNAL MEDICINE

## 2022-06-02 PROCEDURE — 36415 COLL VENOUS BLD VENIPUNCTURE: CPT

## 2022-06-02 PROCEDURE — 96360 HYDRATION IV INFUSION INIT: CPT

## 2022-06-02 PROCEDURE — 85025 COMPLETE CBC W/AUTO DIFF WBC: CPT

## 2022-06-02 PROCEDURE — 87040 BLOOD CULTURE FOR BACTERIA: CPT

## 2022-06-02 PROCEDURE — 65270000029 HC RM PRIVATE

## 2022-06-02 PROCEDURE — 87186 SC STD MICRODIL/AGAR DIL: CPT

## 2022-06-02 PROCEDURE — 74011250636 HC RX REV CODE- 250/636: Performed by: STUDENT IN AN ORGANIZED HEALTH CARE EDUCATION/TRAINING PROGRAM

## 2022-06-02 PROCEDURE — 80053 COMPREHEN METABOLIC PANEL: CPT

## 2022-06-02 PROCEDURE — 74011000250 HC RX REV CODE- 250: Performed by: INTERNAL MEDICINE

## 2022-06-02 RX ORDER — ATORVASTATIN CALCIUM 40 MG/1
40 TABLET, FILM COATED ORAL
Status: DISCONTINUED | OUTPATIENT
Start: 2022-06-02 | End: 2022-06-06 | Stop reason: HOSPADM

## 2022-06-02 RX ORDER — SODIUM CHLORIDE 0.9 % (FLUSH) 0.9 %
5-40 SYRINGE (ML) INJECTION AS NEEDED
Status: DISCONTINUED | OUTPATIENT
Start: 2022-06-02 | End: 2022-06-06 | Stop reason: HOSPADM

## 2022-06-02 RX ORDER — SODIUM CHLORIDE 0.9 % (FLUSH) 0.9 %
5-40 SYRINGE (ML) INJECTION EVERY 8 HOURS
Status: DISCONTINUED | OUTPATIENT
Start: 2022-06-02 | End: 2022-06-06 | Stop reason: HOSPADM

## 2022-06-02 RX ORDER — SODIUM CHLORIDE 9 MG/ML
100 INJECTION, SOLUTION INTRAVENOUS CONTINUOUS
Status: DISCONTINUED | OUTPATIENT
Start: 2022-06-02 | End: 2022-06-03

## 2022-06-02 RX ORDER — FAMOTIDINE 20 MG/1
20 TABLET, FILM COATED ORAL DAILY
COMMUNITY
Start: 2022-03-09

## 2022-06-02 RX ORDER — ACETAMINOPHEN 650 MG/1
650 SUPPOSITORY RECTAL
Status: DISCONTINUED | OUTPATIENT
Start: 2022-06-02 | End: 2022-06-06 | Stop reason: HOSPADM

## 2022-06-02 RX ORDER — ANASTROZOLE 1 MG/1
1 TABLET ORAL DAILY
Status: DISCONTINUED | OUTPATIENT
Start: 2022-06-02 | End: 2022-06-06 | Stop reason: HOSPADM

## 2022-06-02 RX ORDER — PROPRANOLOL HYDROCHLORIDE 10 MG/1
10 TABLET ORAL 2 TIMES DAILY
Status: DISCONTINUED | OUTPATIENT
Start: 2022-06-02 | End: 2022-06-06 | Stop reason: HOSPADM

## 2022-06-02 RX ORDER — POLYETHYLENE GLYCOL 3350 17 G/17G
17 POWDER, FOR SOLUTION ORAL DAILY PRN
Status: DISCONTINUED | OUTPATIENT
Start: 2022-06-02 | End: 2022-06-06 | Stop reason: HOSPADM

## 2022-06-02 RX ORDER — ENOXAPARIN SODIUM 100 MG/ML
40 INJECTION SUBCUTANEOUS DAILY
Status: DISCONTINUED | OUTPATIENT
Start: 2022-06-02 | End: 2022-06-06 | Stop reason: HOSPADM

## 2022-06-02 RX ORDER — ANASTROZOLE 1 MG/1
1 TABLET ORAL DAILY
COMMUNITY
Start: 2022-03-30

## 2022-06-02 RX ORDER — ACETAMINOPHEN 325 MG/1
650 TABLET ORAL
Status: DISCONTINUED | OUTPATIENT
Start: 2022-06-02 | End: 2022-06-06 | Stop reason: HOSPADM

## 2022-06-02 RX ORDER — TROSPIUM CHLORIDE 20 MG/1
20 TABLET, FILM COATED ORAL 2 TIMES DAILY
Status: DISCONTINUED | OUTPATIENT
Start: 2022-06-02 | End: 2022-06-06 | Stop reason: HOSPADM

## 2022-06-02 RX ORDER — SODIUM CHLORIDE 9 MG/ML
100 INJECTION, SOLUTION INTRAVENOUS CONTINUOUS
Status: DISPENSED | OUTPATIENT
Start: 2022-06-02 | End: 2022-06-02

## 2022-06-02 RX ORDER — TOLTERODINE TARTRATE 2 MG/1
2 TABLET, EXTENDED RELEASE ORAL 2 TIMES DAILY
COMMUNITY
End: 2022-06-06

## 2022-06-02 RX ORDER — ONDANSETRON 4 MG/1
4 TABLET, ORALLY DISINTEGRATING ORAL
Status: DISCONTINUED | OUTPATIENT
Start: 2022-06-02 | End: 2022-06-06 | Stop reason: HOSPADM

## 2022-06-02 RX ORDER — LEVOTHYROXINE SODIUM 25 UG/1
50 TABLET ORAL
Status: DISCONTINUED | OUTPATIENT
Start: 2022-06-02 | End: 2022-06-06 | Stop reason: HOSPADM

## 2022-06-02 RX ORDER — FAMOTIDINE 20 MG/1
20 TABLET, FILM COATED ORAL DAILY
Status: DISCONTINUED | OUTPATIENT
Start: 2022-06-02 | End: 2022-06-06 | Stop reason: HOSPADM

## 2022-06-02 RX ORDER — ONDANSETRON 2 MG/ML
4 INJECTION INTRAMUSCULAR; INTRAVENOUS
Status: DISCONTINUED | OUTPATIENT
Start: 2022-06-02 | End: 2022-06-06 | Stop reason: HOSPADM

## 2022-06-02 RX ADMIN — TROSPIUM CHLORIDE 20 MG: 20 TABLET, FILM COATED ORAL at 09:19

## 2022-06-02 RX ADMIN — PROPRANOLOL HYDROCHLORIDE 10 MG: 10 TABLET ORAL at 09:19

## 2022-06-02 RX ADMIN — ONDANSETRON 4 MG: 2 INJECTION INTRAMUSCULAR; INTRAVENOUS at 03:29

## 2022-06-02 RX ADMIN — SODIUM CHLORIDE 100 ML/HR: 9 INJECTION, SOLUTION INTRAVENOUS at 03:08

## 2022-06-02 RX ADMIN — WATER 1 G: 1 INJECTION INTRAMUSCULAR; INTRAVENOUS; SUBCUTANEOUS at 04:40

## 2022-06-02 RX ADMIN — FAMOTIDINE 20 MG: 20 TABLET ORAL at 08:28

## 2022-06-02 RX ADMIN — SODIUM CHLORIDE, PRESERVATIVE FREE 10 ML: 5 INJECTION INTRAVENOUS at 09:25

## 2022-06-02 RX ADMIN — SODIUM CHLORIDE 100 ML/HR: 9 INJECTION, SOLUTION INTRAVENOUS at 09:18

## 2022-06-02 RX ADMIN — ENOXAPARIN SODIUM 40 MG: 100 INJECTION SUBCUTANEOUS at 08:28

## 2022-06-02 RX ADMIN — SODIUM CHLORIDE, PRESERVATIVE FREE 10 ML: 5 INJECTION INTRAVENOUS at 22:00

## 2022-06-02 RX ADMIN — SODIUM CHLORIDE 1000 ML: 9 INJECTION, SOLUTION INTRAVENOUS at 01:16

## 2022-06-02 RX ADMIN — LEVOTHYROXINE SODIUM 100 MCG: 0.03 TABLET ORAL at 08:27

## 2022-06-02 RX ADMIN — ANASTROZOLE 1 MG: 1 TABLET, COATED ORAL at 09:00

## 2022-06-02 NOTE — ED NOTES
Pt assisted with bedpan use. Small amount of dark brown urine noted. Pt assisted with reposition in bed, pillow provided. Call bell in reach.

## 2022-06-02 NOTE — ED NOTES
Pt is concerned because she missed her chemo treatment and would like help with arranging transportation to her doctor's appointments.

## 2022-06-02 NOTE — CONSULTS
NAME:  Liam Pablo   :      MRN:   [id number]     ATTENDING: Belia Woody MD  PCP:  Jagjit Costa MD    Date/Time:  2022 4:28 PM      Subjective:   REQUESTING Neftali Harrell MD  REASON FOR CONSULT:    DEWEY, rhabdo    History of presenting illness: Patient is a 62-year-old female with past medical history of hypothyroidism, breast cancer on chemotherapy brought into the emergency room after a fall at home. She apparently fell backwards and was found lying on the floor by her daughter and was brought into the emergency room. Initial labs showed a high creatine kinase level and an elevated creatinine of 1.19. Nephrology consultation is requested for further evaluation and management. Patient seen in the emergency room, she is alert and awake, she is hard of hearing. History obtained from hospital records. No prior history of any chronic kidney disease, labs done in May 2021 showed normal renal functions. Patient had borderline hypotension with systolic blood pressures in 90s to 110s. On review of home medications she was not on any NSAIDs or ACE inhibitor      Past Medical History:   Diagnosis Date    Essential tremor     Hypercholesterolemia     Hypothyroidism     Urinary incontinence       No past surgical history on file. Social History     Tobacco Use    Smoking status: Never Smoker    Smokeless tobacco: Never Used   Substance Use Topics    Alcohol use: Not Currently      History reviewed. No pertinent family history. Allergies   Allergen Reactions    Codeine Unknown (comments)      Prior to Admission medications    Medication Sig Start Date End Date Taking? Authorizing Provider   famotidine (PEPCID) 20 mg tablet Take 20 mg by mouth daily. 3/9/22  Yes Provider, Historical   anastrozole (ARIMIDEX) 1 mg tablet Take 1 mg by mouth daily. 3/30/22  Yes Provider, Historical   tolterodine (DETROL) 2 mg tablet Take 2 mg by mouth two (2) times a day.    Yes Provider, Historical   levothyroxine (SYNTHROID) 50 mcg tablet Take 1 Tab by mouth daily. 5/13/21  Yes ReasonerCoby PA-C   atorvastatin (LIPITOR) 40 mg tablet Take 40 mg by mouth nightly. 5/3/21  Yes Provider, Historical   propranoloL (INDERAL) 10 mg tablet Take 20 mg by mouth two (2) times a day. 2/26/21  Yes Provider, Historical     Current Facility-Administered Medications   Medication Dose Route Frequency    anastrozole (ARIMIDEX) tablet 1 mg  1 mg Oral DAILY    atorvastatin (LIPITOR) tablet 40 mg  40 mg Oral QHS    trospium (SANCTURA) tablet 20 mg  20 mg Oral BID    levothyroxine (SYNTHROID) tablet 50 mcg  50 mcg Oral ACB    famotidine (PEPCID) tablet 20 mg  20 mg Oral DAILY    propranoloL (INDERAL) tablet 10 mg  10 mg Oral BID    sodium chloride (NS) flush 5-40 mL  5-40 mL IntraVENous Q8H    sodium chloride (NS) flush 5-40 mL  5-40 mL IntraVENous PRN    acetaminophen (TYLENOL) tablet 650 mg  650 mg Oral Q6H PRN    Or    acetaminophen (TYLENOL) suppository 650 mg  650 mg Rectal Q6H PRN    polyethylene glycol (MIRALAX) packet 17 g  17 g Oral DAILY PRN    ondansetron (ZOFRAN ODT) tablet 4 mg  4 mg Oral Q8H PRN    Or    ondansetron (ZOFRAN) injection 4 mg  4 mg IntraVENous Q6H PRN    enoxaparin (LOVENOX) injection 40 mg  40 mg SubCUTAneous DAILY    cefTRIAXone (ROCEPHIN) 1 g in sterile water (preservative free) 10 mL IV syringe  1 g IntraVENous Q24H     Current Outpatient Medications   Medication Sig    famotidine (PEPCID) 20 mg tablet Take 20 mg by mouth daily.  anastrozole (ARIMIDEX) 1 mg tablet Take 1 mg by mouth daily.  tolterodine (DETROL) 2 mg tablet Take 2 mg by mouth two (2) times a day.  levothyroxine (SYNTHROID) 50 mcg tablet Take 1 Tab by mouth daily.  atorvastatin (LIPITOR) 40 mg tablet Take 40 mg by mouth nightly.  propranoloL (INDERAL) 10 mg tablet Take 20 mg by mouth two (2) times a day.        REVIEW OF SYSTEMS:     Complaints as mentioned in the history of presenting illness. No other significant complaints on complete review of systems. Objective:   VITALS:    Visit Vitals  BP 99/69   Pulse 66   Temp 98.5 °F (36.9 °C)   Resp 12   Ht 5' 2\" (1.575 m)   Wt 54.4 kg (120 lb)   SpO2 99%   BMI 21.95 kg/m²     Temp (24hrs), Av.5 °F (36.9 °C), Min:98.4 °F (36.9 °C), Max:98.7 °F (37.1 °C)      PHYSICAL EXAM:   General: alert awake , no acute distress. HEENT: EOMI, no Icterus, no Pallor, pupils reactive, mucosa dry,   Neck: Neck is supple, No JVD, no thyromegaly,  Lungs: breathsounds normal, no respiratory distress on inspection, no rhonchi, no rales,  CVS: heart sounds normal,  no murmurs, no rubs. GI: soft, nontender, normal BS, no palpable organomegaly, no renal angle tenderness. Extremeties: no clubbing, no cyanosis, no edema,  Neuro: Alert, awake, hard of hearing but understanding conversation, following commands   skin: normal skin turgor, no skin rashes.   Musculoskeletal: no acute joint swellings    LAB DATA REVIEWED:    Recent Results (from the past 24 hour(s))   EKG, 12 LEAD, INITIAL    Collection Time: 22 12:42 AM   Result Value Ref Range    Ventricular Rate 86 BPM    Atrial Rate 234 BPM    QRS Duration 100 ms    Q-T Interval 416 ms    QTC Calculation (Bezet) 497 ms    Calculated R Axis -84 degrees    Calculated T Axis 15 degrees    Diagnosis       Accelerated Junctional rhythm  Left axis deviation  Pulmonary disease pattern  Incomplete right bundle branch block  ST & T wave abnormality, consider anterolateral ischemia  When compared with ECG of 09-MAY-2021 21:52,  No significant change was found    Confirmed by Tristan Raphael M.D., Joelle Krabbe ([id number]) on 2022 11:20:00 AM     CBC WITH AUTOMATED DIFF    Collection Time: 22 12:44 AM   Result Value Ref Range    WBC 13.7 (H) 3.6 - 11.0 K/uL    RBC 4.46 3.80 - 5.20 M/uL    HGB 14.2 11.5 - 16.0 g/dL    HCT 40.8 35.0 - 47.0 %    MCV 91.5 80.0 - 99.0 FL    MCH 31.8 26.0 - 34.0 PG    MCHC 34.8 30.0 - 36.5 g/dL    RDW 13.1 11.5 - 14.5 %    PLATELET 218 510 - 248 K/uL    MPV 9.4 8.9 - 12.9 FL    NRBC 0.0 0.0  WBC    ABSOLUTE NRBC 0.00 0.00 - 0.01 K/uL    NEUTROPHILS 85 (H) 32 - 75 %    LYMPHOCYTES 8 (L) 12 - 49 %    MONOCYTES 6 5 - 13 %    EOSINOPHILS 0 0 - 7 %    BASOPHILS 1 0 - 1 %    IMMATURE GRANULOCYTES 0 0 - 0.5 %    ABS. NEUTROPHILS 11.8 (H) 1.8 - 8.0 K/UL    ABS. LYMPHOCYTES 1.0 0.8 - 3.5 K/UL    ABS. MONOCYTES 0.8 0.0 - 1.0 K/UL    ABS. EOSINOPHILS 0.0 0.0 - 0.4 K/UL    ABS. BASOPHILS 0.1 0.0 - 0.1 K/UL    ABS. IMM. GRANS. 0.1 (H) 0.00 - 0.04 K/UL    DF AUTOMATED     METABOLIC PANEL, COMPREHENSIVE    Collection Time: 06/02/22 12:44 AM   Result Value Ref Range    Sodium 138 136 - 145 mmol/L    Potassium 4.2 3.5 - 5.1 mmol/L    Chloride 108 97 - 108 mmol/L    CO2 19 (L) 21 - 32 mmol/L    Anion gap 11 5 - 15 mmol/L    Glucose 113 (H) 65 - 100 mg/dL    BUN 18 6 - 20 mg/dL    Creatinine 1.19 (H) 0.55 - 1.02 mg/dL    BUN/Creatinine ratio 15 12 - 20      GFR est AA 54 (L) >60 ml/min/1.73m2    GFR est non-AA 44 (L) >60 ml/min/1.73m2    Calcium 9.4 8.5 - 10.1 mg/dL    Bilirubin, total 1.9 (H) 0.2 - 1.0 mg/dL    AST (SGOT) 55 (H) 15 - 37 U/L    ALT (SGPT) 33 12 - 78 U/L    Alk.  phosphatase 90 45 - 117 U/L    Protein, total 6.9 6.4 - 8.2 g/dL    Albumin 3.9 3.5 - 5.0 g/dL    Globulin 3.0 2.0 - 4.0 g/dL    A-G Ratio 1.3 1.1 - 2.2     URINALYSIS W/MICROSCOPIC    Collection Time: 06/02/22 12:44 AM   Result Value Ref Range    Color Yellow      Appearance Turbid (A) Clear      Specific gravity 1.026 1.003 - 1.030      pH (UA) 5.0 5.0 - 8.0      Protein 30 (A) Negative mg/dL    Glucose Negative Negative mg/dL    Ketone 20 (A) Negative mg/dL    Bilirubin Negative Negative      Blood Moderate (A) Negative      Urobilinogen 0.1 0.1 - 1.0 EU/dL    Nitrites Positive (A) Negative      Leukocyte Esterase Trace (A) Negative      WBC 0-4 0 - 4 /hpf    RBC 0-5 0 - 5 /hpf    Bacteria 4+ (A) Negative /hpf    Mucus 1+ (A) Negative /lpf Hyaline cast 5-10 0 - 5 /lpf   CK    Collection Time: 06/02/22 12:44 AM   Result Value Ref Range    CK 1,325 (H) 26 - 192 U/L   TSH 3RD GENERATION    Collection Time: 06/02/22  4:31 AM   Result Value Ref Range    TSH 1.57 0.36 - 3.74 uIU/mL   CK    Collection Time: 06/02/22  9:13 AM   Result Value Ref Range     (H) 26 - 192 U/L       Assessment and plan:     1. Acute Kidney Injury : probably prerenal azotemia secondary to borderline hypotension/?  Dehydration   CPK level still elevated but unlikely to cause any ATN at this level  Recommend to check urinalysis,urine random electrolytes, creatinine and protein. Will monitor serial CPKs   recommend to give IV hydration with normal saline at 100 mL/h   will continue to monitor renal functions and adjust management as needed    2. Rhabdomyolysis: Initial CPK level 1325  Probably related to trauma with fall  Continue IV fluids and monitor serial CPK levels    3. Hypertension: Patient has borderline hypotension, might have contributed to her fall  She was only on propranolol on review of home medications  Propranolol dose was decreased to 10 mg twice daily, continue to monitor blood pressures    4. History of breast cancer, on chemotherapy following with oncology    5. Hypothyroidism:  On levothyroxine, normal TSH level    Signed: Eber Dockery MD

## 2022-06-02 NOTE — ED PROVIDER NOTES
[id number]  EMERGENCY DEPARTMENT ENCOUNTER NOTE    Date: 6/2/2022  Patient Name: Liam Pablo      History of Presenting Illness     Chief Complaint   Patient presents with    Fall       History Provided By: Patient    HPI: Liam Pablo, 77 y.o. female presents after being found on the bathroom floor today. Patient reports that she fell earlier today and could not get up. She is walking minimal distances using a walker. She lives by herself with home assistance. Patient reports that she is feeling dizzy now. She has not ate or drank all day. She is denying any preceding symptoms of dizziness, chest pain, shortness of breath. Patient is reporting generalized weakness that has not changed from baseline. Does not remember if she hit her head or not. Presenting issue of moderate severity, no other aggravating leaving factors no associated symptoms. There are no other complaints, changes, or physical findings at this time. PCP: Jagjit Costa MD    Current Facility-Administered Medications   Medication Dose Route Frequency Provider Last Rate Last Admin    anastrozole (ARIMIDEX) tablet 1 mg  1 mg Oral DAILY Mazie Boas, MD        atorvastatin (LIPITOR) tablet 40 mg  40 mg Oral QHS Malinda Vasquez MD        . PHARMACY TO SUBSTITUTE PER PROTOCOL (Reordered from: Detrol LA 2 mg ER capsule)    Per Protocol Malinda Vasquez MD        levothyroxine (SYNTHROID) tablet 50 mcg  50 mcg Oral ACB Malinda Vasquez MD        famotidine (PEPCID) tablet 20 mg  20 mg Oral DAILY Malinda Vasquez MD        propranoloL (INDERAL) tablet 10 mg  10 mg Oral BID Mazie Boas, MD        sodium chloride (NS) flush 5-40 mL  5-40 mL IntraVENous Q8H Malinda Vasquez MD        sodium chloride (NS) flush 5-40 mL  5-40 mL IntraVENous PRN Mazie Boas, MD        acetaminophen (TYLENOL) tablet 650 mg  650 mg Oral Q6H PRN Mazie Boas, MD        Or    acetaminophen (TYLENOL) suppository 650 mg  650 mg Rectal Q6H PRN Gary Vasquez MD        polyethylene glycol (MIRALAX) packet 17 g  17 g Oral DAILY PRN Gary Vasquez MD        ondansetron (ZOFRAN ODT) tablet 4 mg  4 mg Oral Q8H PRN Gary Vasquez MD        Or    ondansetron (ZOFRAN) injection 4 mg  4 mg IntraVENous Q6H PRN Gonzalo Mackenzie MD        enoxaparin (LOVENOX) injection 40 mg  40 mg SubCUTAneous DAILY Gary Vasquez MD        0.9% sodium chloride infusion  100 mL/hr IntraVENous CONTINUOUS Gary Vasquez MD Hardin Aquilla [place] ON 6/3/2022] cefTRIAXone (ROCEPHIN) 1 g in sterile water (preservative free) 10 mL IV syringe  1 g IntraVENous Q24H Gary Vasquez MD         Current Outpatient Medications   Medication Sig Dispense Refill    famotidine (PEPCID) 20 mg tablet Take 20 mg by mouth daily.  anastrozole (ARIMIDEX) 1 mg tablet Take 1 mg by mouth daily.  levothyroxine (SYNTHROID) 50 mcg tablet Take 1 Tab by mouth daily. 30 Tab 1    ciprofloxacin HCl (Cipro) 500 mg tablet Take 1 Tab by mouth two (2) times a day. 10 Tab 0    atorvastatin (LIPITOR) 40 mg tablet Take 40 mg by mouth nightly.  Detrol LA 2 mg ER capsule Take 2 mg by mouth daily.  propranoloL (INDERAL) 10 mg tablet Take 10 mg by mouth two (2) times a day. Past History     Past Medical History:  Past Medical History:   Diagnosis Date    Essential tremor     Hypercholesterolemia     Hypothyroidism     Urinary incontinence        Past Surgical History:  No past surgical history on file. Family History:  History reviewed. No pertinent family history. Social History:  Social History     Tobacco Use    Smoking status: Never Smoker    Smokeless tobacco: Never Used   Substance Use Topics    Alcohol use: Not Currently    Drug use: Not Currently       Allergies:   Allergies   Allergen Reactions    Codeine Unknown (comments)         Review of Systems     Review of Systems    A 10 point review of system was performed was negative except as noted above in HPI    Physical Exam     Physical Exam  Vitals and nursing note reviewed. Constitutional:       Appearance: She is well-developed. Comments: Frail and elderly  female in no acute distress   HENT:      Head: Normocephalic and atraumatic. Eyes:      Extraocular Movements: Extraocular movements intact. Conjunctiva/sclera: Conjunctivae normal.   Cardiovascular:      Rate and Rhythm: Normal rate and regular rhythm. Heart sounds: Normal heart sounds. Pulmonary:      Effort: Pulmonary effort is normal.      Breath sounds: Normal breath sounds. Abdominal:      Palpations: Abdomen is soft. Tenderness: There is no abdominal tenderness. Musculoskeletal:      Cervical back: Neck supple. Right lower leg: No tenderness. No edema. Left lower leg: No tenderness. No edema. Neurological:      Mental Status: She is alert and oriented to person, place, and time. Comments: Global decreased effort. No focal deficits appreciated. Lab and Diagnostic Study Results     Labs -     Recent Results (from the past 12 hour(s))   CBC WITH AUTOMATED DIFF    Collection Time: 06/02/22 12:44 AM   Result Value Ref Range    WBC 13.7 (H) 3.6 - 11.0 K/uL    RBC 4.46 3.80 - 5.20 M/uL    HGB 14.2 11.5 - 16.0 g/dL    HCT 40.8 35.0 - 47.0 %    MCV 91.5 80.0 - 99.0 FL    MCH 31.8 26.0 - 34.0 PG    MCHC 34.8 30.0 - 36.5 g/dL    RDW 13.1 11.5 - 14.5 %    PLATELET 721 743 - 413 K/uL    MPV 9.4 8.9 - 12.9 FL    NRBC 0.0 0.0  WBC    ABSOLUTE NRBC 0.00 0.00 - 0.01 K/uL    NEUTROPHILS 85 (H) 32 - 75 %    LYMPHOCYTES 8 (L) 12 - 49 %    MONOCYTES 6 5 - 13 %    EOSINOPHILS 0 0 - 7 %    BASOPHILS 1 0 - 1 %    IMMATURE GRANULOCYTES 0 0 - 0.5 %    ABS. NEUTROPHILS 11.8 (H) 1.8 - 8.0 K/UL    ABS. LYMPHOCYTES 1.0 0.8 - 3.5 K/UL    ABS. MONOCYTES 0.8 0.0 - 1.0 K/UL    ABS. EOSINOPHILS 0.0 0.0 - 0.4 K/UL    ABS. BASOPHILS 0.1 0.0 - 0.1 K/UL    ABS. IMM.  GRANS. 0.1 (H) 0.00 - 0.04 K/UL    DF AUTOMATED METABOLIC PANEL, COMPREHENSIVE    Collection Time: 06/02/22 12:44 AM   Result Value Ref Range    Sodium 138 136 - 145 mmol/L    Potassium 4.2 3.5 - 5.1 mmol/L    Chloride 108 97 - 108 mmol/L    CO2 19 (L) 21 - 32 mmol/L    Anion gap 11 5 - 15 mmol/L    Glucose 113 (H) 65 - 100 mg/dL    BUN 18 6 - 20 mg/dL    Creatinine 1.19 (H) 0.55 - 1.02 mg/dL    BUN/Creatinine ratio 15 12 - 20      GFR est AA 54 (L) >60 ml/min/1.73m2    GFR est non-AA 44 (L) >60 ml/min/1.73m2    Calcium 9.4 8.5 - 10.1 mg/dL    Bilirubin, total 1.9 (H) 0.2 - 1.0 mg/dL    AST (SGOT) 55 (H) 15 - 37 U/L    ALT (SGPT) 33 12 - 78 U/L    Alk. phosphatase 90 45 - 117 U/L    Protein, total 6.9 6.4 - 8.2 g/dL    Albumin 3.9 3.5 - 5.0 g/dL    Globulin 3.0 2.0 - 4.0 g/dL    A-G Ratio 1.3 1.1 - 2.2     URINALYSIS W/MICROSCOPIC    Collection Time: 06/02/22 12:44 AM   Result Value Ref Range    Color Yellow      Appearance Turbid (A) Clear      Specific gravity 1.026 1.003 - 1.030      pH (UA) 5.0 5.0 - 8.0      Protein 30 (A) Negative mg/dL    Glucose Negative Negative mg/dL    Ketone 20 (A) Negative mg/dL    Bilirubin Negative Negative      Blood Moderate (A) Negative      Urobilinogen 0.1 0.1 - 1.0 EU/dL    Nitrites Positive (A) Negative      Leukocyte Esterase Trace (A) Negative      WBC 0-4 0 - 4 /hpf    RBC 0-5 0 - 5 /hpf    Bacteria 4+ (A) Negative /hpf    Mucus 1+ (A) Negative /lpf    Hyaline cast 5-10 0 - 5 /lpf   CK    Collection Time: 06/02/22 12:44 AM   Result Value Ref Range    CK 1,325 (H) 26 - 192 U/L       Radiologic Studies -   [unfilled]  CT Results  (Last 48 hours)               06/02/22 0110  CT HEAD WO CONT Final result    Impression:  1. No findings of acute intracranial abnormality. 2. Nonspecific white matter findings that are most commonly the result of   chronic microangiopathy. 3. 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         Narrative:  Exam: CT Head without contrast       TECHNIQUE: Multiple transaxial CT images of the head were obtained without   contrast. Coronal and sagittal reformatted images were provided. Dose reduction: All CT scans at this facility are performed using dose reduction   optimization techniques as appropriate to a performed exam including the   following: Automated exposure control, adjustments of the mA and/or kV according   to patient size, or use of iterative reconstruction technique. HISTORY: fall       COMPARISON: CT head and neck angiogram 5/9/2021       FINDINGS:       Global parenchymal volume loss with associated proportional ex vacuo dilatation   of the ventricles and other extra-axial CSF spaces. Deep white matter   hypoattenuation, nonspecific, but most commonly the result of chronic   microangiopathy. There is likely a remote lacunar infarct within the right   subinsular region, similar to prior. No findings of acute large vessel territory   infarct. No findings of acute intracranial hemorrhage. Basilar cisterns appear   preserved. Mastoid air cells appear clear. There is mild mucosal thickening of the ethmoid   sinuses. Postoperative appearance of the globes with bilateral lens replacement. Calvarium appears intact without findings of acute or aggressive abnormality. CXR Results  (Last 48 hours)    None          Medical Decision Making and ED Course   - I am the first and primary provider for this patient AND AM THE PRIMARY PROVIDER OF RECORD. - I reviewed the vital signs, available nursing notes, past medical history, past surgical history, family history and social history. - Initial assessment performed. The patients presenting problems have been discussed, and the staff are in agreement with the care plan formulated and outlined with them. I have encouraged them to ask questions as they arise throughout their visit. Vital Signs-Reviewed the patient's vital signs.     Patient Vitals for the past 24 hrs:   Temp Pulse Resp BP SpO2 06/02/22 0208 -- 85 25 110/68 99 %   06/02/22 0204 -- -- -- -- 97 %   06/02/22 0125 -- 79 16 118/73 97 %   06/02/22 0037 98.4 °F (36.9 °C) 84 18 108/78 94 %       Records Reviewed: Nursing Notes    Provider Notes (Medical Decision Making):     Patient presents to the ED after being found on the floor. Reports that she fallen earlier in the day and she could not get up. She is denying any other symptoms that may suggest preceding cardiac events. She reports that the fall is mechanical without any other symptoms. Imaging of her head was done which did not show any acute finding. Her labs however showed mild kidney impairment as well as rhabdomyolysis with signs of a tract infection and urinalysis. Patient will need to be admitted to the hospital for rehydration and treatment of her DEWEY and rhabdomyolysis. Plan of care was discussed with the hospitalist who is agreeable. Disposition     Disposition: Condition stable    Admitted      Diagnosis     Clinical Impression:   1. Traumatic rhabdomyolysis, initial encounter ([facility] [address]    2. DEWEY (acute kidney injury) ([facility] [address]    3. Urinary tract infection with hematuria, site unspecified        Attestations: Delphine Habermann, MD    Please note that this dictation was completed with [employer], the computer voice recognition software. Quite often unanticipated grammatical, syntax, homophones, and other interpretive errors are inadvertently transcribed by the computer software. Please disregard these errors. Please excuse any errors that have escaped final proofreading. Thank you.

## 2022-06-02 NOTE — CONSULTS
Hematology/Oncology Consult    Patient: Liam Pablo MRN: [id number]     YOB: 1945  Age: 77 y.o. Sex: female      HPI      Liam Pablo is a 77 y.o. female who is being seen for breast cancer on treatment. Ms. Shima Azar is a 78-year-old female patient with left-sided triple positive breast cancer diagnosed recently and status post lumpectomy and sentinel lymph node biopsy in 1/2022. She follows with Dr. Rl Chiang. She reportedly declined adjuvant radiation therapy and is currently on Herceptin and Perjeta alone and last received treatment on 5/11/22. Patient was admitted to the emergency room after her legs reportedly gave way and fell backwards hitting her head and laid on the floor until she was found by her daughter and brought to the emergency room by EMS. Further work-up on admission showed abnormal urinalysis as well as rhabdomyolysis. She is being treated with antibiotics and IV fluids at this time. The patient was seen in the emergency room where she is awaiting a bed to be admitted to the floor. The patient is hard of hearing and is not a good historian. She denies pain at this time. Past Medical History:   Diagnosis Date    Essential tremor     Hypercholesterolemia     Hypothyroidism     Urinary incontinence      No past surgical history on file. History reviewed. No pertinent family history.   Social History     Tobacco Use    Smoking status: Never Smoker    Smokeless tobacco: Never Used   Substance Use Topics    Alcohol use: Not Currently      Current Facility-Administered Medications   Medication Dose Route Frequency Provider Last Rate Last Admin    anastrozole (ARIMIDEX) tablet 1 mg  1 mg Oral DAILY Mazie Boas, MD   1 mg at 06/02/22 0900    atorvastatin (LIPITOR) tablet 40 mg  40 mg Oral QHS Malinda Vasquez MD        [facility]) tablet 20 mg  20 mg Oral BID Mazie Boas, MD   20 mg at 06/02/22 0919    levothyroxine (SYNTHROID) tablet 50 mcg  50 mcg Oral ACB Calli Mujica MD   100 mcg at 06/02/22 0827    famotidine (PEPCID) tablet 20 mg  20 mg Oral DAILY Calli Mujica MD   20 mg at 06/02/22 9724    propranoloL (INDERAL) tablet 10 mg  10 mg Oral BID Calli Mujica MD   10 mg at 06/02/22 0919    sodium chloride (NS) flush 5-40 mL  5-40 mL IntraVENous Q8H Kyra Vasquez MD   10 mL at 06/02/22 0925    sodium chloride (NS) flush 5-40 mL  5-40 mL IntraVENous PRN Kyra Vasquez MD        acetaminophen (TYLENOL) tablet 650 mg  650 mg Oral Q6H PRN Kyra Vasquez MD        Or    acetaminophen (TYLENOL) suppository 650 mg  650 mg Rectal Q6H PRN Kyra Vasquez MD        polyethylene glycol (MIRALAX) packet 17 g  17 g Oral DAILY PRN Kyra Vasquez MD        ondansetron (ZOFRAN ODT) tablet 4 mg  4 mg Oral Q8H PRN Kyra Vasquez MD        Or    ondansetron [facility]) injection 4 mg  4 mg IntraVENous Q6H PRN Calli Mujica MD   4 mg at 06/02/22 0329    enoxaparin (LOVENOX) injection 40 mg  40 mg SubCUTAneous DAILY Calli Mujica MD   40 mg at 06/02/22 4293    cefTRIAXone (ROCEPHIN) 1 g in sterile water (preservative free) 10 mL IV syringe  1 g IntraVENous Q24H Calli Mujica MD   1 g at 06/02/22 0440     Current Outpatient Medications   Medication Sig Dispense Refill    famotidine (PEPCID) 20 mg tablet Take 20 mg by mouth daily.  anastrozole (ARIMIDEX) 1 mg tablet Take 1 mg by mouth daily.  tolterodine (DETROL) 2 mg tablet Take 2 mg by mouth two (2) times a day.  levothyroxine (SYNTHROID) 50 mcg tablet Take 1 Tab by mouth daily. 30 Tab 1    atorvastatin (LIPITOR) 40 mg tablet Take 40 mg by mouth nightly.  propranoloL (INDERAL) 10 mg tablet Take 20 mg by mouth two (2) times a day. Allergies   Allergen Reactions    Codeine Unknown (comments)       Review of Systems:  Constitutional No fevers, chills, night sweats, excessive fatigue or weight loss.    Allergic/Immunologic No recent allergic reactions   Eyes No significant visual difficulties. No diplopia. ENMT No problems with hearing, no sore throat, no sinus drainage. Endocrine No hot flashes or night sweats. No cold intolerance, polyuria, or polydipsia   Hematologic/Lymphatic No easy bruising or bleeding. The patient denies any tender or palpable lymph nodes   Breasts No abnormal masses of breast, nipple discharge or pain. Respiratory No dyspnea on exertion, orthopnea, chest pain, cough or hemoptysis. Cardiovascular No anginal chest pain, irregular heart beat, tachycardia, palpitations or orthopnea. Gastrointestinal No nausea, vomiting, diarrhea, constipation, cramping, dysphagia, reflux, heartburn, GI bleeding, or early satiety. No change in bowel habits. Genitourinary (M) No hematuria, dysuria, increased frequency, urgency, hesitancy or incontinence. Musculoskeletal No joint pain, swelling or redness. No decreased range of motion. Integumentary No chronic rashes, inflammation, ulcerations, pruritus, petechiae, purpura, ecchymoses, or skin changes. Neurologic No headache, blurred vision, and no areas of focal weakness or numbness. Normal gait. No sensory problems. Psychiatric No insomnia, depression, magalis or mood swings. No psychotropic drugs. Objective:     Vitals:    06/02/22 0730 06/02/22 0915 06/02/22 1130 06/02/22 1233   BP: 97/74 108/62 103/60 105/75   Pulse: 71 77 74 75   Resp: 18 18 18 18   Temp: 98.6 °F (37 °C) 98.6 °F (37 °C) 98.4 °F (36.9 °C) 98.5 °F (36.9 °C)   SpO2: 98% 97% 98% 98%   Weight:       Height:            Physical Exam:  Constitutional Alert, cooperative, oriented. Mood and affect appropriate. Appears close to chronological age. Well nourished. Well developed. Head Normocephalic; no scars   Eyes Conjunctivae and sclerae are clear and without icterus. Pupils are reactive and equal.   ENMT Sinuses are nontender. No oral exudates, ulcers, masses, thrush or mucositis. Oropharynx clear.   Tongue normal.   Neck Supple without masses or thyromegaly. No jugular venous distension. Hematologic/Lymphatic No petechiae or purpura. No tender or palpable lymph nodes in the cervical, supraclavicular, axillary or inguinal area. Respiratory Lungs are clear to auscultation without rhonchi or wheezing. Cardiovascular Regular rate and rhythm of heart without murmurs, gallops or rubs. Chest / Line Site Chest is symmetric with no chest wall deformities. Abdomen Non-tender, non-distended, no masses, ascites or hepatosplenomegaly. Good bowel sounds. No guarding or rebound tenderness. No pulsatile masses. Musculoskeletal No tenderness or swelling, normal range of motion without obvious weakness. Extremities No visible deformities, no cyanosis, clubbing or edema. Skin No rashes, scars, or lesions suggestive of malignancy. No petechiae, purpura, or ecchymoses. No excoriations. Neurologic No sensory or motor deficits, normal cerebellar function, normal gait, cranial nerves intact. Psychiatric Alert and oriented times three. Coherent speech. Verbalizes understanding of our discussions today. Lab/Data Review:  Recent Labs     06/02/22  0044   WBC 13.7*   HGB 14.2   HCT 40.8        Recent Labs     06/02/22  0044      K 4.2      CO2 19*   *   BUN 18   CREA 1.19*   CA 9.4   ALB 3.9   TBILI 1.9*   ALT 33     No results for input(s): PH, PCO2, PO2, HCO3, FIO2 in the last 72 hours.   Recent Results (from the past 24 hour(s))   EKG, 12 LEAD, INITIAL    Collection Time: 06/02/22 12:42 AM   Result Value Ref Range    Ventricular Rate 86 BPM    Atrial Rate 234 BPM    QRS Duration 100 ms    Q-T Interval 416 ms    QTC Calculation (Bezet) 497 ms    Calculated R Axis -84 degrees    Calculated T Axis 15 degrees    Diagnosis       Accelerated Junctional rhythm  Left axis deviation  Pulmonary disease pattern  Incomplete right bundle branch block  ST & T wave abnormality, consider anterolateral ischemia  When compared with ECG of 09-MAY-2021 21:52,  No significant change was found    Confirmed by Lenore Sanford M.D., Anson Stanton ([id number]) on 6/2/2022 11:20:00 AM     CBC WITH AUTOMATED DIFF    Collection Time: 06/02/22 12:44 AM   Result Value Ref Range    WBC 13.7 (H) 3.6 - 11.0 K/uL    RBC 4.46 3.80 - 5.20 M/uL    HGB 14.2 11.5 - 16.0 g/dL    HCT 40.8 35.0 - 47.0 %    MCV 91.5 80.0 - 99.0 FL    MCH 31.8 26.0 - 34.0 PG    MCHC 34.8 30.0 - 36.5 g/dL    RDW 13.1 11.5 - 14.5 %    PLATELET 746 265 - 759 K/uL    MPV 9.4 8.9 - 12.9 FL    NRBC 0.0 0.0  WBC    ABSOLUTE NRBC 0.00 0.00 - 0.01 K/uL    NEUTROPHILS 85 (H) 32 - 75 %    LYMPHOCYTES 8 (L) 12 - 49 %    MONOCYTES 6 5 - 13 %    EOSINOPHILS 0 0 - 7 %    BASOPHILS 1 0 - 1 %    IMMATURE GRANULOCYTES 0 0 - 0.5 %    ABS. NEUTROPHILS 11.8 (H) 1.8 - 8.0 K/UL    ABS. LYMPHOCYTES 1.0 0.8 - 3.5 K/UL    ABS. MONOCYTES 0.8 0.0 - 1.0 K/UL    ABS. EOSINOPHILS 0.0 0.0 - 0.4 K/UL    ABS. BASOPHILS 0.1 0.0 - 0.1 K/UL    ABS. IMM. GRANS. 0.1 (H) 0.00 - 0.04 K/UL    DF AUTOMATED     METABOLIC PANEL, COMPREHENSIVE    Collection Time: 06/02/22 12:44 AM   Result Value Ref Range    Sodium 138 136 - 145 mmol/L    Potassium 4.2 3.5 - 5.1 mmol/L    Chloride 108 97 - 108 mmol/L    CO2 19 (L) 21 - 32 mmol/L    Anion gap 11 5 - 15 mmol/L    Glucose 113 (H) 65 - 100 mg/dL    BUN 18 6 - 20 mg/dL    Creatinine 1.19 (H) 0.55 - 1.02 mg/dL    BUN/Creatinine ratio 15 12 - 20      GFR est AA 54 (L) >60 ml/min/1.73m2    GFR est non-AA 44 (L) >60 ml/min/1.73m2    Calcium 9.4 8.5 - 10.1 mg/dL    Bilirubin, total 1.9 (H) 0.2 - 1.0 mg/dL    AST (SGOT) 55 (H) 15 - 37 U/L    ALT (SGPT) 33 12 - 78 U/L    Alk.  phosphatase 90 45 - 117 U/L    Protein, total 6.9 6.4 - 8.2 g/dL    Albumin 3.9 3.5 - 5.0 g/dL    Globulin 3.0 2.0 - 4.0 g/dL    A-G Ratio 1.3 1.1 - 2.2     URINALYSIS W/MICROSCOPIC    Collection Time: 06/02/22 12:44 AM   Result Value Ref Range    Color Yellow      Appearance Turbid (A) Clear      Specific gravity 1.026 1.003 - 1.030 pH (UA) 5.0 5.0 - 8.0      Protein 30 (A) Negative mg/dL    Glucose Negative Negative mg/dL    Ketone 20 (A) Negative mg/dL    Bilirubin Negative Negative      Blood Moderate (A) Negative      Urobilinogen 0.1 0.1 - 1.0 EU/dL    Nitrites Positive (A) Negative      Leukocyte Esterase Trace (A) Negative      WBC 0-4 0 - 4 /hpf    RBC 0-5 0 - 5 /hpf    Bacteria 4+ (A) Negative /hpf    Mucus 1+ (A) Negative /lpf    Hyaline cast 5-10 0 - 5 /lpf   CK    Collection Time: 06/02/22 12:44 AM   Result Value Ref Range    CK 1,325 (H) 26 - 192 U/L   TSH 3RD GENERATION    Collection Time: 06/02/22  4:31 AM   Result Value Ref Range    TSH 1.57 0.36 - 3.74 uIU/mL   CK    Collection Time: 06/02/22  9:13 AM   Result Value Ref Range     (H) 26 - 192 U/L        CT HEAD WO CONT    Result Date: 6/2/2022  1. No findings of acute intracranial abnormality. 2. Nonspecific white matter findings that are most commonly the result of chronic microangiopathy. 3. 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 Assessment and Plan:     Hospital Problems  Never Reviewed          Codes Class Noted POA    UTI (urinary tract infection) ICD-10-CM: N39.0  ICD-9-CM: 599.0  5/10/2021 Unknown              Left-sided breast cancer:  -Follows with Dr. Marsha Tapia  -Status post lumpectomy sentinel lymph node biopsy on 1/2022 and noted to have multifocal T2 N0 tumor  -Declined adjuvant radiation therapy  -Currently on HER2 directed therapy with Herceptin and pertuzumab alone due to borderline performance status  -Received cycle 3 of treatment on 5/11/2022    UTI:  -On antibiotics per primary team    Rhabdomyolysis:  -Secondary to fall  -Management per primary team    Patient needs social work/case management consult as the patient lives alone. Thank you for the consult.     Signed By: Nicole Garvey MD     June 2, 2022

## 2022-06-02 NOTE — ED TRIAGE NOTES
Pt reports falling at aprox 6am yesterday in kitchen, pt was unable to get up and found by family.  Pt denies loc, stroke scale negative

## 2022-06-02 NOTE — PROGRESS NOTES
[id number] [facility] Hospitalist Progress Note  Erick Arauz MD    CETZ:[id number]       Room:[facility]  Patient Hailee Hood     YOB: 1945     Age:76 y.o.    22 admission course  Marcy Aschoff is a 77 y.o. female with PMH of breast cancer, hypothyroidism, essential tremor, and HLP. Limited history obtained as patient is hard of hearing and mildly demented. Per ED note, Patient reports that she fell earlier today and could not get up. Lev Franco is walking minimal distances using a walker.  She lives by herself with home assistance. Associated with dizziness and generalized weakness. Denies any preceding symptoms of dizziness, chest pain, shortness of breath. Was recently diagnosed with breast cancer but missed her chemo last week. Oncologist in [facility].    In the ED, noted to have leukocytosis, DEWEY and UTI.     22 doing well with no new complaints  Rhabdomyolysis: CK remains markedly elevated, management with intravenous fluids  Urinary infection: ceftriaxone for now pending urine culture data    Subjective    Subjective:  Symptoms:  Stable. Review of Systems   All other systems reviewed and are negative. Objective         Vitals Last 24 Hours:  TEMPERATURE:  Temp  Av.6 °F (37 °C)  Min: 98.4 °F (36.9 °C)  Max: 98.7 °F (37.1 °C)  RESPIRATIONS RANGE: Resp  Av.8  Min: 14  Max: 25  PULSE OXIMETRY RANGE: SpO2  Av.8 %  Min: 94 %  Max: 99 %  PULSE RANGE: Pulse  Av.9  Min: 69  Max: 89  BLOOD PRESSURE RANGE: Systolic (52ZTZ), HVB:446 , Min:96 , OAV:880   ; Diastolic (42VEJ), WHL:03, Min:68, Max:89    I/O (24Hr): Intake/Output Summary (Last 24 hours) at 2022 0749  Last data filed at 2022 0233  Gross per 24 hour   Intake 1000 ml   Output --   Net 1000 ml     Objective:  General Appearance:  Comfortable. Vital signs: (most recent): Blood pressure 128/80, pulse 74, temperature 98.7 °F (37.1 °C), resp. rate 18, height 5' 2\" (1.575 m), weight 54.4 kg (120 lb), SpO2 97 %.     HEENT: Normal HEENT exam.    Lungs:  Normal effort. Heart: Normal rate. Regular rhythm. Abdomen: Abdomen is soft. Bowel sounds are normal.     Extremities: Normal range of motion. Pulses: Distal pulses are intact. Neurological: Patient is alert. Skin:  Warm and dry. Labs/Imaging/Diagnostics    Labs:  CBC:  Recent Labs     06/02/22 0044   WBC 13.7*   RBC 4.46   HGB 14.2   HCT 40.8   MCV 91.5   RDW 13.1        CHEMISTRIES:  Recent Labs     06/02/22 0044      K 4.2      CO2 19*   BUN 18   CA 9.4   PT/INR:No results for input(s): INR, INREXT in the last 72 hours. No lab exists for component: PROTIME  APTT:No results for input(s): APTT in the last 72 hours. LIVER PROFILE:  Recent Labs     06/02/22 0044   AST 55*   ALT 33     Lab Results   Component Value Date/Time    ALT (SGPT) 33 06/02/2022 12:44 AM    AST (SGOT) 55 (H) 06/02/2022 12:44 AM    Alk. phosphatase 90 06/02/2022 12:44 AM    Bilirubin, total 1.9 (H) 06/02/2022 12:44 AM       Imaging Last 24 Hours:  CT HEAD WO CONT    Result Date: 6/2/2022  Exam: CT Head without contrast TECHNIQUE: Multiple transaxial CT images of the head were obtained without contrast. Coronal and sagittal reformatted images were provided. Dose reduction: All CT scans at this facility are performed using dose reduction optimization techniques as appropriate to a performed exam including the following: Automated exposure control, adjustments of the mA and/or kV according to patient size, or use of iterative reconstruction technique. HISTORY: fall COMPARISON: CT head and neck angiogram 5/9/2021 FINDINGS: Global parenchymal volume loss with associated proportional ex vacuo dilatation of the ventricles and other extra-axial CSF spaces. Deep white matter hypoattenuation, nonspecific, but most commonly the result of chronic microangiopathy. There is likely a remote lacunar infarct within the right subinsular region, similar to prior.  No findings of acute large vessel territory infarct. No findings of acute intracranial hemorrhage. Basilar cisterns appear preserved. Mastoid air cells appear clear. There is mild mucosal thickening of the ethmoid sinuses. Postoperative appearance of the globes with bilateral lens replacement. Calvarium appears intact without findings of acute or aggressive abnormality. 1. No findings of acute intracranial abnormality. 2. Nonspecific white matter findings that are most commonly the result of chronic microangiopathy. 3. 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 Assessment//Plan   Active Problems:    UTI (urinary tract infection) (5/10/2021)      Assessment & Plan     6/2/22 admission course  Gabrielle Patterson is a 77 y.o. female with PMH of breast cancer, hypothyroidism, essential tremor, and HLP. Limited history obtained as patient is hard of hearing and mildly demented. Per ED note, Patient reports that she fell earlier today and could not get up. Yaritza Flores is walking minimal distances using a walker.  She lives by herself with home assistance. Associated with dizziness and generalized weakness. Denies any preceding symptoms of dizziness, chest pain, shortness of breath. Was recently diagnosed with breast cancer but missed her chemo last week. Oncologist in [facility].    In the ED, noted to have leukocytosis, DEWEY and UTI.     6/2/22 doing well with no new complaints  Rhabdomyolysis: CK remains markedly elevated, management with intravenous fluids  Urinary infection: ceftriaxone for now pending urine culture data    # UTI  - Ceftriaxone empirically  - urine culture and blood culture pending     # DEWEY  - Likely dehydration from poor oral intake and sepsis  - IVF infusion  - Recheck in AM.      # Rhabdomyolysis  - IVF infusion  - Recheck CK in a.m.     #Breast cancer  - Continue anastrozole  - Continue follow-up with her oncologist as outpatient       #Hypothyroidism  - Continue home medications.     - Levothyroxine dose was reduced on last admission, recheck TSH.     #Essential tremors  - Continue home medications.       # GERD  - Continue home medications.       # Fall  - PT/OT      # DVT prophylaxis with lovenox    Electronically signed by Curtis Wade MD on 6/2/2022 at 7:49 AM

## 2022-06-02 NOTE — PROGRESS NOTES
Problem: Mobility Impaired (Adult and Pediatric)  Goal: *Acute Goals and Plan of Care (Insert Text)  Description: I with LE HEP x7 days  SBA with supine to sit EOB x 7 days  CGA with sit to stand x 7 days  CGA with stand pivot from bed to chair x 7 days  Progress to amb 25-50ft with RW and CGAx1 x7 days  Outcome: Not Met    PHYSICAL THERAPY EVALUATION  Patient: Mely Tran (32 y.o. female)  Date: 6/2/2022  Primary Diagnosis: UTI (urinary tract infection) [N39.0]        Precautions: pt is [place]       ASSESSMENT    78yo F admitted to hospital with UTI/generalized weakness and s/p fall at home presents to PT with decreased bed mob, transfers, LE strength, gt, activity tolerance, and overall functional mobility. PMH listed below, pt does have recent hx of breast CA. No report of fx from fall. Pt supine on stretcher upon PT arrival, agreeable to work with PT. Pt alert and oriented x 4. Pt lives alone in 1 story home with 3 ASTRID home with B rails. PTA pt states she is normally I with ADLs, and her cousin comes to check on her every other day. Pt states she uses a walker for amb at home. Pt also has life alert necklace. Pt did have low BP upon initial assessment today, cleared by nursing to work with PT. Pt not symptomatic upon initial assessment, however became dizzy during mobility. Pt min A for supine to sit EOB, pt able to sit but req constant A to maintain static sitting balance. Pt stated she became dizzy upon sitting upright, nursing present. Dizziness did not resolve after sitting for brief period of time and pt requested to lay back down. It was not deemed safe at to attempt OOB transfers at this time due to inc dizziness, as well as pt was still on stretcher and even with stretcher in lowest position pt's feet still unable to reach floor to attempt standing. Pt was assisted back to supine in bed with min A. Pt may benefit from continued skilled PT to address her functional deficits.   Recommend SNF upon d/c at this time. PLAN :  Recommendations and Planned Interventions: bed mobility training, transfer training, gait training, therapeutic exercises, patient and family training/education, and therapeutic activities      Frequency/Duration: Patient will be followed by physical therapy:  5 times a week to address goals. Recommendation for discharge: (in order for the patient to meet his/her long term goals)  [address]:   Patient supine in bed upon PT arrival, agreeable to work with PT    OBJECTIVE DATA SUMMARY:   HISTORY:    Past Medical History:   Diagnosis Date    Essential tremor     Hypercholesterolemia     Hypothyroidism     Urinary incontinence    No past surgical history on file. Personal factors and/or comorbidities impacting plan of care:     Home Situation  Home Environment: Private residence  # Steps to Enter: 3  Rails to Enter: Yes  One/Two Story Residence: One story  Living Alone: Yes  Support Systems: Other (Comment),Other Family Member(s) (states cousin comes to check on her every other day)  Patient Expects to be Discharged to[de-identified] Skilled nursing facility  Current DME Used/Available at Home: Walker, rolling        EXAMINATION/PRESENTATION/DECISION MAKING:   Critical Behavior:  Neurologic State: Alert,Other (Comment) (Hard of Hearing)  Orientation Level: Oriented X4  Cognition: Appropriate safety awareness,Appropriate decision making,Follows commands           Range Of Motion:  AROM: Generally decreased, functional     B LE    Strength:    Strength: Generally decreased, functional  Grossly 3+/5 B LE, tested in supine    Tone & Sensation:      Intact to LT B LE    Functional Mobility:  Bed Mobility:     Supine to Sit: Minimum assistance  Sit to Supine: Minimum assistance  Scooting: Minimum assistance  Transfers:     Held OOB mobility, see assessment for details    Balance:   Sitting: Impaired; With support  Sitting - Static: Poor (constant support)  Sitting - Dynamic: Poor (constant support)  Ambulation/Gait Training:     Held ambulation attempt at this time, see assessment for details        Functional Measure:  [place] Form  How much difficulty does the patient currently have. .. Unable A Lot A Little None   1. Turning over in bed (including adjusting bedclothes, sheets and blankets)? [] 1   [] 2   [x] 3   [] 4   2. Sitting down on and standing up from a chair with arms ( e.g., wheelchair, bedside commode, etc.)   [] 1   [x] 2   [] 3   [] 4   3. Moving from lying on back to sitting on the side of the bed? [] 1   [] 2   [x] 3   [] 4          How much help from another person does the patient currently need. .. Total A Lot A Little None   4. Moving to and from a bed to a chair (including a wheelchair)? [] 1   [x] 2   [] 3   [] 4   5. Need to walk in hospital room? [] 1   [x] 2   [] 3   [] 4   6. Climbing 3-5 steps with a railing? [] 1   [x] 2   [] 3   [] 4   © 2007, Trustees of [address] [place] [address], under license to [employer]. All rights reserved     Score:  Initial: 14 Most Recent: X (Date: -- )   Interpretation of Tool:  Represents activities that are increasingly more difficult (i.e. Bed mobility, Transfers, Gait).   Score 24 23 22-20 19-15 14-10 9-7 6   Modifier CH CI CJ CK CL CM CN           Physical Therapy Evaluation Charge Determination   History Examination Presentation Decision-Making   MEDIUM  Complexity : 1-2 comorbidities / personal factors will impact the outcome/ POC  MEDIUM Complexity : 3 Standardized tests and measures addressing body structure, function, activity limitation and / or participation in recreation  MEDIUM Complexity : Evolving with changing characteristics  Other Functional Measure AMPA 6 MED      Based on the above components, the patient evaluation is determined to be of the following complexity level: MEDIUM    Pain Rating:  No c/o pain during session    Activity Tolerance:   Fair and Poor      After treatment patient left in no apparent distress:   Supine in bed, Call bell within reach, and PCT present    Patient Stated Goal: pt states she wants to get stronger      COMMUNICATION/EDUCATION:   The patients plan of care was discussed with: Registered nurse. Patient/family agree to work toward stated goals and plan of care.       Thank you for this referral.  Emily Guardado   Time Calculation: 14 mins

## 2022-06-02 NOTE — H&P
History and Physical    Patient: Susie Vizcarra MRN: [id number]  SSN: [id number]    YOB: 1945  Age: 77 y.o. Sex: female      Subjective: Susie Vizcarra is a 77 y.o. female with PMH of breast cancer, hypothyroidism, essential tremor, and HLP. Limited history obtained as patient is hard of hearing and mildly demented. Per ED note, Patient reports that she fell earlier today and could not get up. She is walking minimal distances using a walker. She lives by herself with home assistance. Associated with dizziness and generalized weakness. Denies any preceding symptoms of dizziness, chest pain, shortness of breath. Was recently diagnosed with breast cancer but missed her chemo last week. Oncologist in [facility]. In the ED, noted to have leukocytosis, DEWEY and UTI. Past Medical History:   Diagnosis Date    Essential tremor     Hypercholesterolemia     Hypothyroidism     Urinary incontinence      No past surgical history on file. History reviewed. No pertinent family history. Social History     Tobacco Use    Smoking status: Never Smoker    Smokeless tobacco: Never Used   Substance Use Topics    Alcohol use: Not Currently      Prior to Admission medications    Medication Sig Start Date End Date Taking? Authorizing Provider   famotidine (PEPCID) 20 mg tablet Take 20 mg by mouth daily. 3/9/22   Provider, Historical   anastrozole (ARIMIDEX) 1 mg tablet Take 1 mg by mouth daily. 3/30/22   Provider, Historical   levothyroxine (SYNTHROID) 50 mcg tablet Take 1 Tab by mouth daily. 5/13/21   Tashia Littlejohn PA-C   ciprofloxacin HCl (Cipro) 500 mg tablet Take 1 Tab by mouth two (2) times a day. 5/13/21   Tashia Littlejohn PA-C   atorvastatin (LIPITOR) 40 mg tablet Take 40 mg by mouth nightly. 5/3/21   Provider, Historical   Detrol LA 2 mg ER capsule Take 2 mg by mouth daily. 4/14/21   Provider, Historical   propranoloL (INDERAL) 10 mg tablet Take 10 mg by mouth two (2) times a day.  2/26/21 Provider, Historical        Allergies   Allergen Reactions    Codeine Unknown (comments)       Review of Systems:   Constitutional: No fevers, No chills. Positive for hearing loss. Eyes: No visual disturbance  Ears, Nose, Mouth, Throat, and Face: No nasal congestion, No sore throat  Respiratory: No cough, No sputum, No wheezing, No SOB  Cardiovascular: No chest pain, No lower extremity edema, No Palpitations   Gastrointestinal: No nausea, No vomiting, No diarrhea, No constipation, No abdominal pain  Genitourinary: No frequency, No dysuria, No hematuria  Integument/Breast: No rash, No skin lesion(s), No dryness  Musculoskeletal: No arthralgias, No neck pain, No back pain  Neurological: See HPI. Behavioral/Psychiatric: No anxiety, No depression      Objective:     Vitals:    06/02/22 0037 06/02/22 0125 06/02/22 0204 06/02/22 0208   BP: 108/78 118/73  110/68   Pulse: 84 79  85   Resp: 18 16  25   Temp: 98.4 °F (36.9 °C)      SpO2: 94% 97% 97% 99%   Weight: 54.4 kg (120 lb)      Height: 5' 2\" (1.575 m)           Physical Exam:   General: Fatigued looking, cooperative, no distress  Eye: conjunctivae/corneas clear. PERRL, EOM's intact. Throat and Neck: normal and no erythema or exudates noted. No mass   Lung: clear to auscultation bilaterally  Heart: regular rate and rhythm,   Abdomen: soft, non-tender. Bowel sounds normal. No masses,  Extremities:  able to move all extremities normal, atraumatic  Skin: Normal.  Neurologic: Motor function and sensation grossly intact.   Psychiatric: non focal    Recent Results (from the past 24 hour(s))   CBC WITH AUTOMATED DIFF    Collection Time: 06/02/22 12:44 AM   Result Value Ref Range    WBC 13.7 (H) 3.6 - 11.0 K/uL    RBC 4.46 3.80 - 5.20 M/uL    HGB 14.2 11.5 - 16.0 g/dL    HCT 40.8 35.0 - 47.0 %    MCV 91.5 80.0 - 99.0 FL    MCH 31.8 26.0 - 34.0 PG    MCHC 34.8 30.0 - 36.5 g/dL    RDW 13.1 11.5 - 14.5 %    PLATELET 156 621 - 775 K/uL    MPV 9.4 8.9 - 12.9 FL    NRBC 0.0 0. 0  WBC    ABSOLUTE NRBC 0.00 0.00 - 0.01 K/uL    NEUTROPHILS 85 (H) 32 - 75 %    LYMPHOCYTES 8 (L) 12 - 49 %    MONOCYTES 6 5 - 13 %    EOSINOPHILS 0 0 - 7 %    BASOPHILS 1 0 - 1 %    IMMATURE GRANULOCYTES 0 0 - 0.5 %    ABS. NEUTROPHILS 11.8 (H) 1.8 - 8.0 K/UL    ABS. LYMPHOCYTES 1.0 0.8 - 3.5 K/UL    ABS. MONOCYTES 0.8 0.0 - 1.0 K/UL    ABS. EOSINOPHILS 0.0 0.0 - 0.4 K/UL    ABS. BASOPHILS 0.1 0.0 - 0.1 K/UL    ABS. IMM. GRANS. 0.1 (H) 0.00 - 0.04 K/UL    DF AUTOMATED     METABOLIC PANEL, COMPREHENSIVE    Collection Time: 06/02/22 12:44 AM   Result Value Ref Range    Sodium 138 136 - 145 mmol/L    Potassium 4.2 3.5 - 5.1 mmol/L    Chloride 108 97 - 108 mmol/L    CO2 19 (L) 21 - 32 mmol/L    Anion gap 11 5 - 15 mmol/L    Glucose 113 (H) 65 - 100 mg/dL    BUN 18 6 - 20 mg/dL    Creatinine 1.19 (H) 0.55 - 1.02 mg/dL    BUN/Creatinine ratio 15 12 - 20      GFR est AA 54 (L) >60 ml/min/1.73m2    GFR est non-AA 44 (L) >60 ml/min/1.73m2    Calcium 9.4 8.5 - 10.1 mg/dL    Bilirubin, total 1.9 (H) 0.2 - 1.0 mg/dL    AST (SGOT) 55 (H) 15 - 37 U/L    ALT (SGPT) 33 12 - 78 U/L    Alk.  phosphatase 90 45 - 117 U/L    Protein, total 6.9 6.4 - 8.2 g/dL    Albumin 3.9 3.5 - 5.0 g/dL    Globulin 3.0 2.0 - 4.0 g/dL    A-G Ratio 1.3 1.1 - 2.2     URINALYSIS W/MICROSCOPIC    Collection Time: 06/02/22 12:44 AM   Result Value Ref Range    Color Yellow      Appearance Turbid (A) Clear      Specific gravity 1.026 1.003 - 1.030      pH (UA) 5.0 5.0 - 8.0      Protein 30 (A) Negative mg/dL    Glucose Negative Negative mg/dL    Ketone 20 (A) Negative mg/dL    Bilirubin Negative Negative      Blood Moderate (A) Negative      Urobilinogen 0.1 0.1 - 1.0 EU/dL    Nitrites Positive (A) Negative      Leukocyte Esterase Trace (A) Negative      WBC 0-4 0 - 4 /hpf    RBC 0-5 0 - 5 /hpf    Bacteria 4+ (A) Negative /hpf    Mucus 1+ (A) Negative /lpf    Hyaline cast 5-10 0 - 5 /lpf   CK    Collection Time: 06/02/22 12:44 AM   Result Value Ref Range    CK 1,325 (H) 26 - 192 U/L       XR Results (maximum last 3): Results from [place] encounter on 05/09/21    XR KNEE RT 3 V    Narrative  EXAMINATION: XR KNEE RT 3 V    HISTORY: Fall    COMPARISON: None available. FINDINGS: 3 view(s) of the right knee are submitted. Study is slightly  overpenetrated, which limits evaluation of the soft tissues. There is no acute  fracture or dislocation. There may be trace fluid in the joint. Impression  1. No acute fracture      XR KNEE LT 3 V    Narrative  EXAMINATION: XR KNEE LT 3 V    HISTORY: Fall    COMPARISON: None available. FINDINGS: 3 view(s) of the left knee are submitted. There is no acute fracture  or dislocation. No definite joint effusion. There are mild degenerative changes. Impression  1. No acute fracture of the left knee. XR CHEST PORT    Narrative  EXAMINATION: XR CHEST PORT    HISTORY: Fall    COMPARISON: 11/21/2016    FINDINGS: Single view. The lungs are clear. There is no pneumothorax or pleural  effusion. Heart size and mediastinal contours are unchanged. The thoracic aorta  is atherosclerotic. Small, incompletely evaluated focus of sclerosis in the left  proximal humerus. Impression  No acute cardiopulmonary abnormality. CT Results (maximum last 3): Results from [place] encounter on 06/02/22    CT HEAD WO CONT    Narrative  Exam: CT Head without contrast    TECHNIQUE: Multiple transaxial CT images of the head were obtained without  contrast. Coronal and sagittal reformatted images were provided. Dose reduction: All CT scans at this facility are performed using dose reduction  optimization techniques as appropriate to a performed exam including the  following: Automated exposure control, adjustments of the mA and/or kV according  to patient size, or use of iterative reconstruction technique.     HISTORY: fall    COMPARISON: CT head and neck angiogram 5/9/2021    FINDINGS:    Global parenchymal volume loss with associated proportional ex vacuo dilatation  of the ventricles and other extra-axial CSF spaces. Deep white matter  hypoattenuation, nonspecific, but most commonly the result of chronic  microangiopathy. There is likely a remote lacunar infarct within the right  subinsular region, similar to prior. No findings of acute large vessel territory  infarct. No findings of acute intracranial hemorrhage. Basilar cisterns appear  preserved. Mastoid air cells appear clear. There is mild mucosal thickening of the ethmoid  sinuses. Postoperative appearance of the globes with bilateral lens replacement. Calvarium appears intact without findings of acute or aggressive abnormality. Impression  1. No findings of acute intracranial abnormality. 2. Nonspecific white matter findings that are most commonly the result of  chronic microangiopathy. 3. 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 Results from [place] encounter on 05/09/21    CTA CODE NEURO HEAD AND NECK W CONT    Narrative  HISTORY:  weakness  Dose reduction technique: All CT scans at this facility are performed using dose reduction optimization  technique as appropriate on the exam including the following: Automated exposure  control, adjustment of the MA and/or KV according to patient size of use of  iterative reconstructive technique. .    TECHNIQUE: CTA CODE NEURO NECK W CONT   CT angiogram of the neck performed and  maximum intensity projection images (MIPS) generated. 100 cc Isovue-370 injected.   All stenosis measurements performed using NASCET  criteria  COMPARISON: None  LIMITATIONS: None    AORTIC ARCH: Visualized aortic arch normal  CAROTID ARTERIES: Normal  VERTEBRAL ARTERIES: Normal    OTHER ARTERIES: Other visualized arteries appear unremarkable  VENOUS STRUCTURES: Visualized venous structures appear unremarkable  OSSEOUS STRUCTURES and SOFT TISSUES: Normal  OTHER: None    Impression  Normal CT angiogram of the neck.      HISTORY:  weakness  Dose reduction technique: All CT scans at this facility are performed using dose reduction optimization  technique as appropriate on the exam including the following: Automated exposure  control, adjustment of the MA and/or KV according to patient size and/or use of  iterative reconstructive technique. TECHNIQUE: CTA CODE NEURO HEAD W CONTCT angiogram of the brain was performed and  maximum intensity projection images (MIPS) were generated. 100 cc Isovue-370 injected. All stenosis measurements performed using NASCET  criteria. COMPARISON: Brain CT performed as previous to this examination  LIMITATIONS: None      Noncontrast brain CT demonstrates no evidence of acute intracranial hemorrhage,  mass effect, or midline shift. No acute extra-axial abnormalities. Ventricles  are patent and clear. Postcontrast brain CT angiogram findings:  CAROTID ARTERIES: Normal  ANTERIOR CEREBRAL ARTERIES: Normal  MIDDLE CEREBRAL ARTERIES: Normal  POSTERIOR CEREBRAL ARTERIES: Normal  BASILAR ARTERY: Normal  VERTEBRAL ARTERIES: Normal  VENOUS STRUCTURES: Visualized venous structures unremarkable. OTHER: None    IMPRESSION: Normal CT angiogram of the brain. CT SPINE LUMB WO CONT    Narrative  HISTORY:  fall. pain  Dose reduction technique: All CT scans at this facility are performed using dose reduction optimization  technique as appropriate on the exam including the following: Automated exposure  control, adjustment of the MA and/or KV according to patient size of use of  iterative reconstructive technique. .    TECHNIQUE: Noncontrast CT of the lumbar spine with multiplanar reconstructions. COMPARISON: 7/11/2019 abdominopelvic CT  LIMITATIONS: None    FRACTURES: No acute fractures. Superior endplate depression at H93 and L4 are  unchanged from the patient's prior exam.  ALIGNMENT: Normal  VERTEBRAL BODIES: Some mild anterior osteophyte formation.  Superior endplate  depressions at T12 and L4 again noted. DISC SPACES: Mild posterior disc bulge at L2-3. Posterior disc/osteophyte  complex at L3-4 and with mild bilateral neural foraminal narrowing. POSTERIOR ELEMENTS: Mild multilevel facet arthropathic changes. SPINAL CANAL: Normal  SACROILIAC JOINT: Visualized portions unremarkable  PARASPINAL SOFT TISSUES: Normal    OTHER: None    Impression  Mild multilevel degenerative disc changes, chronic superior endplate  depressions of T12 and L4, but no acute findings related to the patient's  current trauma. MRI Results (maximum last 3): No results found for this or any previous visit. Nuclear Medicine Results (maximum last 3): No results found for this or any previous visit. US Results (maximum last 3): No results found for this or any previous visit. Assessment and plan:   # UTI  - Ceftriaxone empirically  - urine culture and blood culture     # DEWEY  - Likely dehydration from poor oral intake and sepsis  - IVF infusion  - Recheck in AM.     # Rhabdomyolysis  - IVF infusion  - Recheck CK in a.m. #Breast cancer  - Continue anastrozole  - Continue follow-up with her oncologist as outpatient      #Hypothyroidism  - Continue home medications. - Levothyroxine dose was reduced on last admission, recheck TSH. #Essential tremors  - Continue home medications. # GERD  - Continue home medications. # Fall  - PT/OT     # Full code by default, need further clarification    # Medication list reviewed on Epic and/or outside documentation. Not reviewed with patient.         Signed By: Maria Alejandra Jewell MD     June 2, 2022

## 2022-06-02 NOTE — PROGRESS NOTES
6/2/22 Pt accepted to [facility] on the [place] @ [phone] per Dimitri. Will need Covid Test within 3 days of admission.

## 2022-06-02 NOTE — PROGRESS NOTES
Reason for Admission:  UTI                     RUR Score:  9%                   Plan for utilizing home health:Pts' cousin/POA Adali JoseViktor ) signed Choice Letter for SNF. Referral sent via Dimitri. Uses [facility]/awaiting therapy evals. PCP: First and Last name:  Alonzo Steel MD     Name of Practice:    Are you a current patient: Yes/No: Yes   Approximate date of last visit:    Can you participate in a virtual visit with your PCP: Raffaele Hernandez                    Current Advanced Directive/Advance Care Plan: Full Code      Healthcare Decision Maker:   Primary hCDM is Ruddy Torres @ [phone]. Transition of Care Plan:  D/C Plan is SNF placement via transportation.

## 2022-06-03 ENCOUNTER — APPOINTMENT (OUTPATIENT)
Dept: GENERAL RADIOLOGY | Age: 77
DRG: 565 | End: 2022-06-03
Attending: INTERNAL MEDICINE
Payer: MEDICARE

## 2022-06-03 LAB
ALBUMIN SERPL-MCNC: 2.8 G/DL (ref 3.5–5)
ANION GAP SERPL CALC-SCNC: 5 MMOL/L (ref 5–15)
ANION GAP SERPL CALC-SCNC: 8 MMOL/L (ref 5–15)
BASOPHILS # BLD: 0.1 K/UL (ref 0–0.1)
BASOPHILS NFR BLD: 1 % (ref 0–1)
BUN SERPL-MCNC: 10 MG/DL (ref 6–20)
BUN SERPL-MCNC: 9 MG/DL (ref 6–20)
BUN/CREAT SERPL: 11 (ref 12–20)
BUN/CREAT SERPL: 15 (ref 12–20)
CA-I BLD-MCNC: 8.2 MG/DL (ref 8.5–10.1)
CA-I BLD-MCNC: 8.4 MG/DL (ref 8.5–10.1)
CHLORIDE SERPL-SCNC: 114 MMOL/L (ref 97–108)
CHLORIDE SERPL-SCNC: 115 MMOL/L (ref 97–108)
CK SERPL-CCNC: 384 U/L (ref 26–192)
CO2 SERPL-SCNC: 19 MMOL/L (ref 21–32)
CO2 SERPL-SCNC: 20 MMOL/L (ref 21–32)
COVID-19 RAPID TEST, COVR: NOT DETECTED
CREAT SERPL-MCNC: 0.66 MG/DL (ref 0.55–1.02)
CREAT SERPL-MCNC: 0.8 MG/DL (ref 0.55–1.02)
DIFFERENTIAL METHOD BLD: ABNORMAL
EOSINOPHIL # BLD: 0.1 K/UL (ref 0–0.4)
EOSINOPHIL NFR BLD: 2 % (ref 0–7)
ERYTHROCYTE [DISTWIDTH] IN BLOOD BY AUTOMATED COUNT: 13.2 % (ref 11.5–14.5)
GLUCOSE SERPL-MCNC: 90 MG/DL (ref 65–100)
GLUCOSE SERPL-MCNC: 92 MG/DL (ref 65–100)
HCT VFR BLD AUTO: 32.9 % (ref 35–47)
HGB BLD-MCNC: 11.3 G/DL (ref 11.5–16)
IMM GRANULOCYTES # BLD AUTO: 0 K/UL (ref 0–0.04)
IMM GRANULOCYTES NFR BLD AUTO: 0 % (ref 0–0.5)
LYMPHOCYTES # BLD: 1.7 K/UL (ref 0.8–3.5)
LYMPHOCYTES NFR BLD: 22 % (ref 12–49)
MCH RBC QN AUTO: 31.7 PG (ref 26–34)
MCHC RBC AUTO-ENTMCNC: 34.3 G/DL (ref 30–36.5)
MCV RBC AUTO: 92.4 FL (ref 80–99)
MONOCYTES # BLD: 0.4 K/UL (ref 0–1)
MONOCYTES NFR BLD: 6 % (ref 5–13)
NEUTS SEG # BLD: 5.6 K/UL (ref 1.8–8)
NEUTS SEG NFR BLD: 69 % (ref 32–75)
NRBC # BLD: 0 K/UL (ref 0–0.01)
NRBC BLD-RTO: 0 PER 100 WBC
PHOSPHATE SERPL-MCNC: 2.3 MG/DL (ref 2.6–4.7)
PLATELET # BLD AUTO: 211 K/UL (ref 150–400)
PMV BLD AUTO: 9.5 FL (ref 8.9–12.9)
POTASSIUM SERPL-SCNC: 3.6 MMOL/L (ref 3.5–5.1)
POTASSIUM SERPL-SCNC: 3.7 MMOL/L (ref 3.5–5.1)
RBC # BLD AUTO: 3.56 M/UL (ref 3.8–5.2)
SODIUM SERPL-SCNC: 140 MMOL/L (ref 136–145)
SODIUM SERPL-SCNC: 141 MMOL/L (ref 136–145)
WBC # BLD AUTO: 7.9 K/UL (ref 3.6–11)

## 2022-06-03 PROCEDURE — 74011250637 HC RX REV CODE- 250/637: Performed by: INTERNAL MEDICINE

## 2022-06-03 PROCEDURE — 97530 THERAPEUTIC ACTIVITIES: CPT

## 2022-06-03 PROCEDURE — 85025 COMPLETE CBC W/AUTO DIFF WBC: CPT

## 2022-06-03 PROCEDURE — 97165 OT EVAL LOW COMPLEX 30 MIN: CPT

## 2022-06-03 PROCEDURE — 65270000029 HC RM PRIVATE

## 2022-06-03 PROCEDURE — 80048 BASIC METABOLIC PNL TOTAL CA: CPT

## 2022-06-03 PROCEDURE — 36415 COLL VENOUS BLD VENIPUNCTURE: CPT

## 2022-06-03 PROCEDURE — 82550 ASSAY OF CK (CPK): CPT

## 2022-06-03 PROCEDURE — 73030 X-RAY EXAM OF SHOULDER: CPT

## 2022-06-03 PROCEDURE — 74011000250 HC RX REV CODE- 250: Performed by: INTERNAL MEDICINE

## 2022-06-03 PROCEDURE — 74011250636 HC RX REV CODE- 250/636: Performed by: INTERNAL MEDICINE

## 2022-06-03 PROCEDURE — 80069 RENAL FUNCTION PANEL: CPT

## 2022-06-03 PROCEDURE — 87635 SARS-COV-2 COVID-19 AMP PRB: CPT

## 2022-06-03 RX ORDER — SODIUM,POTASSIUM PHOSPHATES 280-250MG
1 POWDER IN PACKET (EA) ORAL 3 TIMES DAILY
Status: COMPLETED | OUTPATIENT
Start: 2022-06-03 | End: 2022-06-05

## 2022-06-03 RX ADMIN — POTASSIUM & SODIUM PHOSPHATES POWDER PACK 280-160-250 MG 1 PACKET: 280-160-250 PACK at 21:20

## 2022-06-03 RX ADMIN — TROSPIUM CHLORIDE 20 MG: 20 TABLET, FILM COATED ORAL at 10:29

## 2022-06-03 RX ADMIN — POTASSIUM & SODIUM PHOSPHATES POWDER PACK 280-160-250 MG 1 PACKET: 280-160-250 PACK at 15:56

## 2022-06-03 RX ADMIN — ONDANSETRON 4 MG: 2 INJECTION INTRAMUSCULAR; INTRAVENOUS at 21:20

## 2022-06-03 RX ADMIN — SODIUM CHLORIDE, PRESERVATIVE FREE 10 ML: 5 INJECTION INTRAVENOUS at 18:23

## 2022-06-03 RX ADMIN — SODIUM CHLORIDE 100 ML/HR: 9 INJECTION, SOLUTION INTRAVENOUS at 00:00

## 2022-06-03 RX ADMIN — PROPRANOLOL HYDROCHLORIDE 10 MG: 10 TABLET ORAL at 10:28

## 2022-06-03 RX ADMIN — SODIUM CHLORIDE, PRESERVATIVE FREE 10 ML: 5 INJECTION INTRAVENOUS at 05:39

## 2022-06-03 RX ADMIN — LEVOTHYROXINE SODIUM 50 MCG: 0.03 TABLET ORAL at 10:28

## 2022-06-03 RX ADMIN — TROSPIUM CHLORIDE 20 MG: 20 TABLET, FILM COATED ORAL at 22:49

## 2022-06-03 RX ADMIN — SODIUM CHLORIDE, PRESERVATIVE FREE 10 ML: 5 INJECTION INTRAVENOUS at 21:20

## 2022-06-03 RX ADMIN — ENOXAPARIN SODIUM 40 MG: 100 INJECTION SUBCUTANEOUS at 10:40

## 2022-06-03 RX ADMIN — ANASTROZOLE 1 MG: 1 TABLET, COATED ORAL at 10:42

## 2022-06-03 RX ADMIN — FAMOTIDINE 20 MG: 20 TABLET ORAL at 10:28

## 2022-06-03 RX ADMIN — PROPRANOLOL HYDROCHLORIDE 10 MG: 10 TABLET ORAL at 21:19

## 2022-06-03 RX ADMIN — ATORVASTATIN CALCIUM 40 MG: 40 TABLET, FILM COATED ORAL at 21:19

## 2022-06-03 RX ADMIN — WATER 1 G: 1 INJECTION INTRAMUSCULAR; INTRAVENOUS; SUBCUTANEOUS at 04:30

## 2022-06-03 NOTE — PROGRESS NOTES
Problem: Self Care Deficits Care Plan (Adult)  Goal: *Acute Goals and Plan of Care (Insert Text)  Description: Pt stated goal \"I want to get up\"  Pt will be MI sup<->sit in prep for EOB ADL's  Pt will be MI  LB dressing EOB level  Pt will be MI  sit EOB 10 minutes in prep for EOB ADL's  Pt will be MI  grooming EOB level  Pt will be MI  sit<-> prep for toilet transfer  Pt will be MI  BSC progress to standard toilet transfer with LRAD  Pt will be MI  toileting/cloth mgmt LRAD  Pt will be MI  grooming standing sink  Pt will be MI bathing sitting/standing sink LRAD  Pt will be MI wilber UE HEP in prep for self care tasks    Outcome: Not Met     OCCUPATIONAL THERAPY EVALUATION  Patient: Rohan Brenner (42 y.o. female)  Date: 6/3/2022  Primary Diagnosis: UTI (urinary tract infection) [N39.0]        Precautions: falls       ASSESSMENT  Patient is 76 y/o female came to [facility] s/p found by family pt s/p fall prior day at 6am and adm 6/2/2022 for UTI, DEWEY, rhabdomyolysis, essential tremors, fall. Pt has hx of tremors, hypercholesterolemia, hypothyroidism, urinary incontinence, essential tremors, GERD, hard of hearing, recent dx breast cancer receiving treatment. Pt received semi supine in bed A&Ox4 and agreeable for OT eval/tx. Per pt report, pt lives alone (cousin visits every other day) in one story home with 5 steps wilber rails to enter and is MI for self care (bathing at sink, has shower chair) and MI for functional transfers/mobility using rollator with pt stating 1 fall in last 3 months.      Pt currently presents with decreased activity tolerance (dizziness with bed level rolling), generalized weakness, noted with generalized tremors increasing with activity (noted in wilber UE and LE), [place] and increased need for assist with self care (SBA simple grooming with LUE bed level, total A toileting/cloth mgmt bed level, total A LB dressing bed level, MI self feeding) and functional transfers/mobility (SBA rolling, max Ax2 scooting bed level with further transfers held 2/2 pt c/o dizziness). Pt noted with bruising to RUE/shoulder and noted to be painful to touch and pt requesting x-ray of Right shoulder with nursing informed and aware. Pt would benefit from continued skilled OT services while at [facility] in order to increase safety and independence with self care and functional transfers/mobility. Recommend discharge to SNF when medically appropriate. Other factors to consider for discharge: time since onset, severity of deficits, PLOF        PLAN :  Recommendations and Planned Interventions: self care training, functional mobility training, therapeutic exercise, balance training, therapeutic activities, endurance activities, patient education, and home safety training    Frequency/Duration: Patient will be followed by occupational therapy 3-5x/week to address goals. Recommendation for discharge: (in order for the patient to meet his/her long term goals)  Buck Gomez    This discharge recommendation:  Has been made in collaboration with the attending provider and/or case management    IF patient discharges home will need the following DME: TBD       SUBJECTIVE:   Patient stated I havent been up in days.     OBJECTIVE DATA SUMMARY:   HISTORY:   Past Medical History:   Diagnosis Date    Essential tremor     Hypercholesterolemia     Hypothyroidism     Urinary incontinence    No past surgical history on file. Expanded or extensive additional review of patient history:     Home Situation  Home Environment: Private residence  # Steps to Enter: 5  Rails to Enter: Yes  Hand Rails : Bilateral  One/Two Story Residence: One story  Living Alone: Yes  Support Systems: Other Family Member(s) (cousin visits every other day)  Patient Expects to be Discharged to[de-identified] Skilled nursing facility  Current DME Used/Available at Home: Tana Clamp, rollator,Shower chair    PLOF: Pt MI for ADLS/IADLS, MI with mobility prior to admission. EXAMINATION OF PERFORMANCE DEFICITS:  Cognitive/Behavioral Status:  Neurologic State: Alert  Orientation Level: Oriented X4     Range of Motion:  AROM: Generally decreased, functional (RUE not tested 2/2 painful and pt requesting x-ray)     Strength:  Strength: Generally decreased, functional (LUE grossly 3+/5, RUE not tested)     Functional Mobility and Transfers for ADLs:  Bed Mobility:  Rolling: Stand-by assistance    ADL Assessment:  Feeding: Modified independent    Oral Facial Hygiene/Grooming: Stand-by assistance    Lower Body Dressing: Total assistance (bed level)    Toileting: Total assistance (bed level)     ADL Intervention and task modifications:     Grooming  Grooming Assistance: Stand-by assistance  Position Performed:  (semi supine in bed)  Washing Face: Stand-by assistance  Washing Hands: Stand-by assistance    Lower Body Dressing Assistance  Socks: Total assistance (dependent)  Leg Crossed Method Used: Yes  Position Performed: Supine    Toileting  Toileting Assistance: Total assistance(dependent)  Bladder Hygiene: Total assistance (dependent)  Bowel Hygiene: Total assistance (dependent)    [address] [place] [id number] AM-PAC \"6 Clicks\"                                                       Daily Activity Inpatient Short Form  How much help from another person does the patient currently need. .. Total; A Lot A Little None   1. Putting on and taking off regular lower body clothing? [x]  1 []  2 []  3 []  4   2. Bathing (including washing, rinsing, drying)? []  1 [x]  2 []  3 []  4   3. Toileting, which includes using toilet, bedpan or urinal? [x] 1 []  2 []  3 []  4   4. Putting on and taking off regular upper body clothing? []  1 [x]  2 []  3 []  4   5. Taking care of personal grooming such as brushing teeth? []  1 []  2 [x]  3 []  4   6. Eating meals? []  1 []  2 [x]  3 []  4   © 2007, Trustees of [address] [place] [address] [address] [address], under license to [employer].  All rights reserved     Score: 12/24     Interpretation of Tool:  Represents clinically-significant functional categories (i.e. Activities of daily living). Percentage of Impairment CH    0%   CI    1-19% CJ    20-39% CK    40-59% CL    60-79% CM    80-99% CN     100%   AMPA  Score 6-24 24 23 20-22 15-19 10-14 7-9 6        Occupational Therapy Evaluation Charge Determination   History Examination Decision-Making   LOW Complexity : Brief history review  MEDIUM Complexity : 3-5 performance deficits relating to physical, cognitive , or psychosocial skils that result in activity limitations and / or participation restrictions MEDIUM Complexity : Patient may present with comorbidities that affect occupational performnce. Miniml to moderate modification of tasks or assistance (eg, physical or verbal ) with assesment(s) is necessary to enable patient to complete evaluation       Based on the above components, the patient evaluation is determined to be of the following complexity level: LOW   Pain Rating:  No pain reported at rest, 5/10 right shoulder with light touch    Activity Tolerance:   Poor and requires rest breaks  Please refer to the flowsheet for vital signs taken during this treatment. After treatment patient left in no apparent distress:    Supine in bed, Call bell within reach, and Bed / chair alarm activated    COMMUNICATION/EDUCATION:   The patients plan of care was discussed with: Physical therapist and Registered nurse. Home safety education was provided and the patient/caregiver indicated understanding. and Patient/family have participated as able in goal setting and plan of care. This patients plan of care is appropriate for delegation to [facility].     Thank you for this referral.  Keyonna Pate  Time Calculation: 30 mins

## 2022-06-03 NOTE — PROGRESS NOTES
Problem: Falls - Risk of  Goal: *Absence of Falls  Description: Document Morene Hole Fall Risk and appropriate interventions in the flowsheet.   Outcome: Progressing Towards Goal  Note: Fall Risk Interventions:  Mobility Interventions: Bed/chair exit alarm,OT consult for ADLs,PT Consult for mobility concerns,Patient to call before getting OOB,Strengthening exercises (ROM-active/passive),Utilize walker, cane, or other assistive device              Elimination Interventions: Bed/chair exit alarm,Call light in reach,Toileting schedule/hourly rounds    History of Falls Interventions: Bed/chair exit alarm,Door open when patient unattended

## 2022-06-03 NOTE — PROGRESS NOTES
The purpose of the note is from attending Interdisciplinary Rounds on [address] [facility] Rick Knight can be reached by calling the  at [facility]  [phone]

## 2022-06-03 NOTE — ED NOTES
TRANSFER - OUT REPORT:    Verbal report given to Yen Jaimes on Sony Mejiasant  being transferred to 4e room 429 for routine progression of care       Report consisted of patients Situation, Background, Assessment and   Recommendations(SBAR). Information from the following report(s) Kardex, MAR and Recent Results was reviewed with the receiving nurse. Lines:   Peripheral IV 06/02/22 Anterior;Distal;Right Forearm (Active)        Opportunity for questions and clarification was provided.       Patient transported with:   [employer]

## 2022-06-03 NOTE — PROGRESS NOTES
Renal Progress Note    Patient: Haylie Cazares MRN: [id number]  SSN: [id number]    YOB: 1945  Age: 77 y.o. Sex: female      Admit Date: 6/2/2022    LOS: 1 day     Subjective:   Patient seen at bedside. Alert and awake, no acute distress. Confused, c/o incoordination  No swelling in lower extremities. Creat down to 0.66, cpk 384        Current Facility-Administered Medications   Medication Dose Route Frequency    anastrozole (ARIMIDEX) tablet 1 mg  1 mg Oral DAILY    atorvastatin (LIPITOR) tablet 40 mg  40 mg Oral QHS    trospium (SANCTURA) tablet 20 mg  20 mg Oral BID    levothyroxine (SYNTHROID) tablet 50 mcg  50 mcg Oral ACB    famotidine (PEPCID) tablet 20 mg  20 mg Oral DAILY    propranoloL (INDERAL) tablet 10 mg  10 mg Oral BID    sodium chloride (NS) flush 5-40 mL  5-40 mL IntraVENous Q8H    sodium chloride (NS) flush 5-40 mL  5-40 mL IntraVENous PRN    acetaminophen (TYLENOL) tablet 650 mg  650 mg Oral Q6H PRN    Or    acetaminophen (TYLENOL) suppository 650 mg  650 mg Rectal Q6H PRN    polyethylene glycol (MIRALAX) packet 17 g  17 g Oral DAILY PRN    ondansetron (ZOFRAN ODT) tablet 4 mg  4 mg Oral Q8H PRN    Or    ondansetron (ZOFRAN) injection 4 mg  4 mg IntraVENous Q6H PRN    enoxaparin (LOVENOX) injection 40 mg  40 mg SubCUTAneous DAILY    cefTRIAXone (ROCEPHIN) 1 g in sterile water (preservative free) 10 mL IV syringe  1 g IntraVENous Q24H    0.9% sodium chloride infusion  100 mL/hr IntraVENous CONTINUOUS        Vitals:    06/02/22 1805 06/02/22 2000 06/02/22 2115 06/03/22 1033   BP: (!) 93/52 101/73 122/73 123/76   Pulse: 75 67 71 68   Resp: 20 18 18 18   Temp: 98 °F (36.7 °C)  97.5 °F (36.4 °C) 97.5 °F (36.4 °C)   SpO2: 98% 97% 97% 96%   Weight:       Height:         Objective:   General: alert awake , no acute distress.   HEENT: EOMI, no Icterus, no Pallor, pupils reactive, mucosa dry,   Neck: Neck is supple, No JVD,   Lungs: breathsounds normal,  no rhonchi, no rales,  CVS: heart sounds normal,  no murmurs, no rubs. GI: soft, nontender, normal BS, no palpable organomegaly  Extremeties: no cyanosis, no edema,  Neuro: Alert, awake, hard of hearing but understanding conversation, following commands , confusion+  skin: normal skin turgor, no skin rashes. Intake and Output:  Current Shift: No intake/output data recorded. Last three shifts: 06/01 1901 - 06/03 0700  In: 1000 [I.V.:1000]  Out: -       Lab/Data Review:  Recent Labs     06/03/22  0838 06/02/22 0044   WBC 7.9 13.7*   HGB 11.3* 14.2   HCT 32.9* 40.8    301     Recent Labs     06/03/22 0838 06/02/22 0044     141 138   K 3.7  3.6 4.2   *  114* 108   CO2 20*  19* 19*   GLU 90  92 113*   BUN 10  9 18   CREA 0.66  0.80 1.19*   CA 8.4*  8.2* 9.4   PHOS 2.3*  --    ALB 2.8* 3.9   TBILI  --  1.9*   ALT  --  33     No results for input(s): PH, PCO2, PO2, HCO3, FIO2 in the last 72 hours. Recent Results (from the past 24 hour(s))   CBC WITH AUTOMATED DIFF    Collection Time: 06/03/22  8:38 AM   Result Value Ref Range    WBC 7.9 3.6 - 11.0 K/uL    RBC 3.56 (L) 3.80 - 5.20 M/uL    HGB 11.3 (L) 11.5 - 16.0 g/dL    HCT 32.9 (L) 35.0 - 47.0 %    MCV 92.4 80.0 - 99.0 FL    MCH 31.7 26.0 - 34.0 PG    MCHC 34.3 30.0 - 36.5 g/dL    RDW 13.2 11.5 - 14.5 %    PLATELET 948 991 - 174 K/uL    MPV 9.5 8.9 - 12.9 FL    NRBC 0.0 0.0  WBC    ABSOLUTE NRBC 0.00 0.00 - 0.01 K/uL    NEUTROPHILS 69 32 - 75 %    LYMPHOCYTES 22 12 - 49 %    MONOCYTES 6 5 - 13 %    EOSINOPHILS 2 0 - 7 %    BASOPHILS 1 0 - 1 %    IMMATURE GRANULOCYTES 0 0 - 0.5 %    ABS. NEUTROPHILS 5.6 1.8 - 8.0 K/UL    ABS. LYMPHOCYTES 1.7 0.8 - 3.5 K/UL    ABS. MONOCYTES 0.4 0.0 - 1.0 K/UL    ABS. EOSINOPHILS 0.1 0.0 - 0.4 K/UL    ABS. BASOPHILS 0.1 0.0 - 0.1 K/UL    ABS. IMM.  GRANS. 0.0 0.00 - 0.04 K/UL    DF AUTOMATED     METABOLIC PANEL, BASIC    Collection Time: 06/03/22  8:38 AM   Result Value Ref Range    Sodium 140 136 - 145 mmol/L Potassium 3.7 3.5 - 5.1 mmol/L    Chloride 115 (H) 97 - 108 mmol/L    CO2 20 (L) 21 - 32 mmol/L    Anion gap 5 5 - 15 mmol/L    Glucose 90 65 - 100 mg/dL    BUN 10 6 - 20 mg/dL    Creatinine 0.66 0.55 - 1.02 mg/dL    BUN/Creatinine ratio 15 12 - 20      GFR est AA >60 >60 ml/min/1.73m2    GFR est non-AA >60 >60 ml/min/1.73m2    Calcium 8.4 (L) 8.5 - 10.1 mg/dL   RENAL FUNCTION PANEL    Collection Time: 06/03/22  8:38 AM   Result Value Ref Range    Sodium 141 136 - 145 mmol/L    Potassium 3.6 3.5 - 5.1 mmol/L    Chloride 114 (H) 97 - 108 mmol/L    CO2 19 (L) 21 - 32 mmol/L    Anion gap 8 5 - 15 mmol/L    Glucose 92 65 - 100 mg/dL    BUN 9 6 - 20 mg/dL    Creatinine 0.80 0.55 - 1.02 mg/dL    BUN/Creatinine ratio 11 (L) 12 - 20      GFR est AA >60 >60 ml/min/1.73m2    GFR est non-AA >60 >60 ml/min/1.73m2    Calcium 8.2 (L) 8.5 - 10.1 mg/dL    Phosphorus 2.3 (L) 2.6 - 4.7 mg/dL    Albumin 2.8 (L) 3.5 - 5.0 g/dL   CK    Collection Time: 06/03/22  8:38 AM   Result Value Ref Range     (H) 26 - 192 U/L   COVID-19 RAPID TEST    Collection Time: 06/03/22  1:49 PM   Result Value Ref Range    COVID-19 rapid test Not Detected Not Detected          Assessment and Plan:     1. Acute Kidney Injury : probably prerenal azotemia secondary to borderline hypotension/?  Dehydration   Improved renal functions with IV hydration  Improved CPK levels  We will discontinue IV fluids  Continue to monitor renal functions    2. Rhabdomyolysis: Initial CPK level 1325, down to 384 today  Probably related to trauma with fall  No need for further follow up of cpks     3. Hypertension: Patient has borderline hypotension, might have contributed to her fall  Improved hemodynamic status  May dc propranolol if Bps are lower  continue to monitor blood pressures     4. History of breast cancer, on chemotherapy following with oncology     5. Hypothyroidism: On levothyroxine, normal TSH level    6.  Hypophos: add po neutrophos for 6 doses    Signed By: Alvaro Murray MD     [place] 3, 2022

## 2022-06-03 NOTE — PROGRESS NOTES
[id number] Hospitalist Progress Note  Erick Rick MD    GPAL:3/5[birth year]9818       [address]  Patient Inna Drake     YOB: 1945     Age:76 y.o.    22 admission course  Genaro Masterson is a 77 y.o. female with PMH of breast cancer, hypothyroidism, essential tremor, and HLP. Limited history obtained as patient is hard of hearing and mildly demented. Per ED note, Patient reports that she fell earlier today and could not get up. Fabienne Julien is walking minimal distances using a walker.  She lives by herself with home assistance. Associated with dizziness and generalized weakness. Denies any preceding symptoms of dizziness, chest pain, shortness of breath. Was recently diagnosed with breast cancer but missed her chemo last week. Oncologist in [facility].    In the ED, noted to have leukocytosis, DEWEY and UTI.     22 doing well with no new complaints  Rhabdomyolysis: CK remains markedly elevated, management with intravenous fluids  Urinary infection: ceftriaxone for now pending urine culture data        Subjective    Subjective:  Symptoms:  Stable. Review of Systems   All other systems reviewed and are negative. Objective         Vitals Last 24 Hours:  TEMPERATURE:  Temp  Av.9 °F (36.6 °C)  Min: 97.5 °F (36.4 °C)  Max: 98.5 °F (36.9 °C)  RESPIRATIONS RANGE: Resp  Av.3  Min: 12  Max: 20  PULSE OXIMETRY RANGE: SpO2  Av.5 %  Min: 96 %  Max: 99 %  PULSE RANGE: Pulse  Av.3  Min: 66  Max: 75  BLOOD PRESSURE RANGE: Systolic (70MZQ), HJJ:901 , Min:93 , GHK:500   ; Diastolic (56MGW), AVH:84, Min:52, Max:76    I/O (24Hr): No intake or output data in the 24 hours ending 22 1137  Objective:  General Appearance:  Comfortable. Vital signs: (most recent): Blood pressure 123/76, pulse 68, temperature 97.5 °F (36.4 °C), resp. rate 18, height 5' 2\" (1.575 m), weight 54.4 kg (120 lb), SpO2 96 %. HEENT: Normal HEENT exam.    Lungs:  Normal effort. Heart: Normal rate. Regular rhythm. Abdomen: Abdomen is soft. Bowel sounds are normal.     Extremities: Normal range of motion. Pulses: Distal pulses are intact. Neurological: Patient is alert. Skin:  Warm and dry. Labs/Imaging/Diagnostics    Labs:  CBC:  Recent Labs     06/03/22  0838 06/02/22  0044   WBC 7.9 13.7*   RBC 3.56* 4.46   HGB 11.3* 14.2   HCT 32.9* 40.8   MCV 92.4 91.5   RDW 13.2 13.1    301     CHEMISTRIES:  Recent Labs     06/03/22  0838 06/02/22  0044     141 138   K 3.7  3.6 4.2   *  114* 108   CO2 20*  19* 19*   BUN 10  9 18   CA 8.4*  8.2* 9.4   PHOS 2.3*  --    PT/INR:No results for input(s): INR, INREXT, INREXT in the last 72 hours. No lab exists for component: PROTIME  APTT:No results for input(s): APTT in the last 72 hours. LIVER PROFILE:  Recent Labs     06/02/22  0044   AST 55*   ALT 33     Lab Results   Component Value Date/Time    ALT (SGPT) 33 06/02/2022 12:44 AM    AST (SGOT) 55 (H) 06/02/2022 12:44 AM    Alk. phosphatase 90 06/02/2022 12:44 AM    Bilirubin, total 1.9 (H) 06/02/2022 12:44 AM       Imaging Last 24 Hours:  No results found. Assessment//Plan   Active Problems:    UTI (urinary tract infection) (5/10/2021)      CT Results  (Last 48 hours)               06/02/22 0110  CT HEAD WO CONT Final result    Impression:  1. No findings of acute intracranial abnormality. 2. Nonspecific white matter findings that are most commonly the result of   chronic microangiopathy. 3. 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 Narrative:  Exam: CT Head without contrast       TECHNIQUE: Multiple transaxial CT images of the head were obtained without   contrast. Coronal and sagittal reformatted images were provided.        Dose reduction: All CT scans at this facility are performed using dose reduction   optimization techniques as appropriate to a performed exam including the   following: Automated exposure control, adjustments of the mA and/or kV according   to patient size, or use of iterative reconstruction technique. HISTORY: fall       COMPARISON: CT head and neck angiogram 5/9/2021       FINDINGS:       Global parenchymal volume loss with associated proportional ex vacuo dilatation   of the ventricles and other extra-axial CSF spaces. Deep white matter   hypoattenuation, nonspecific, but most commonly the result of chronic   microangiopathy. There is likely a remote lacunar infarct within the right   subinsular region, similar to prior. No findings of acute large vessel territory   infarct. No findings of acute intracranial hemorrhage. Basilar cisterns appear   preserved. Mastoid air cells appear clear. There is mild mucosal thickening of the ethmoid   sinuses. Postoperative appearance of the globes with bilateral lens replacement. Calvarium appears intact without findings of acute or aggressive abnormality. Assessment & Plan     6/2/22 admission course  Kunal Conteh is a 77 y.o. female with PMH of breast cancer, hypothyroidism, essential tremor, and HLP. Limited history obtained as patient is hard of hearing and mildly demented. Per ED note, Patient reports that she fell earlier today and could not get up. Jaime Bunn is walking minimal distances using a walker.  She lives by herself with home assistance. Associated with dizziness and generalized weakness. Denies any preceding symptoms of dizziness, chest pain, shortness of breath. Was recently diagnosed with breast cancer but missed her chemo last week.  Oncologist in [facility].    In the ED, noted to have leukocytosis, DEWEY and UTI.     6/2/22 doing well with no new complaints  Rhabdomyolysis: CK remains markedly elevated, management with intravenous fluids  Urinary infection: ceftriaxone for now pending urine culture data    6/3/22 reports continued right shoulder pains, XR ordered    MICROBIOLOGY    6/2/22  Urine  Gram Negative Rods  6/2/22  Blood  Negative so far    ASSESSMENT AND PLAN    1) Sepsis at admission in the setting of urinary infection. Ceftriaxone for now pending further culture data    2) Debility, fall at home, complicated by mild rhabdomyolysis, dehydration.      Anticipate need for skilled nursing facility   CK improving   Physical therapy   XR shoulder R ordered    3) Breast cancer     Anastrozole   Outpatient follow up with oncology    4) Hypothyroidism on levothyroxine    5) DVT prophylaxis with enoxaparin        Electronically signed by Vidhya Hauser MD on 6/3/2022 at 7:49 AM

## 2022-06-03 NOTE — PROGRESS NOTES
PHYSICAL THERAPY TREATMENT  Patient: Corbin Muhammad (89 y.o. female)  Date: 6/3/2022  Diagnosis: UTI (urinary tract infection) [N39.0] <principal problem not specified>       Precautions:    Chart, physical therapy assessment, plan of care and goals were reviewed. ASSESSMENT  Patient received semi-supine in bed, agreeable for PT treatment . Pt requires SBA for rolling to L side , CGA for supine>sit transfers with additional time, demonstrates fair static/dynamic sitting balance with support . Completed there ex's in unsupported sitting position for b/l LE strengthening. Pt able to tolerate sitting at the EOB for ~ 10 minutes, c/o mild dizziness. Pt performed STS with Calista,  needs cues for hand placement. Pt took few side steps towards the HOB-2' with Rw, min/modA. Pt found with  soiled chux pad, required total assist for perineal/bowel hygiene. Pt with c/o increased dizziness on rolling from side to side. Overall, pt tolerates session fair today demonstrating improved ability to perform transfers and ambulation, however limited mobility sec to dizziness  . Pt continues with skilled PT services to address deficits with static/dynamic standing balance , activity tolerance and strength b/l Le impacting overall performance of  transfers/mobility. Recommend d/c to SNF when medically appropriate. Current Level of Function Impacting Discharge (mobility/balance): Pt requires Calista for transfers and min/modA for taking few steps with RW. Other factors to consider for discharge: severity of deficits, lives alone, time since onset       PLAN :  Patient continues to benefit from skilled intervention to address the above impairments. Continue treatment per established plan of care. to address goals.     Recommendation for discharge: (in order for the patient to meet his/her long term goals)  Buck Gomez    This discharge recommendation:  Has been made in collaboration with the attending provider and/or case management    IF patient discharges home will need the following DME: to be determined (TBD)       SUBJECTIVE:   Patient stated  I hear good on my R side       OBJECTIVE DATA SUMMARY:   Critical Behavior:  Neurologic State: Alert  Orientation Level: Oriented X4  Cognition: Follows commands     Functional Mobility Training:  Bed Mobility:  Rolling: Stand-by assistance  Supine to Sit: Contact guard assistance; Adaptive equipment  Sit to Supine: Minimum assistance  Scooting: Stand-by assistance    Transfers:  Sit to Stand: Minimum assistance  Stand to Sit: Minimum assistance    Balance:  Sitting: Impaired; With support  Sitting - Static: Fair (occasional)  Sitting - Dynamic: Fair (occasional)  Standing: Impaired; Without support;Pull to stand  Standing - Static: Fair;Constant support  Standing - Dynamic : Poor;Constant support  Ambulation/Gait Training:  Distance (ft): 2 Feet (ft) (Pt took few side steps towards the [facility] )  Assistive Device: Walker, rolling;Gait belt  Ambulation - Level of Assistance: Minimal assistance; Moderate assistance      Therapeutic Exercises:   AP, LAQ, seated marches- 10 reps     Pain Ratin/10    Activity Tolerance:   Fair  Please refer to the flowsheet for vital signs taken during this treatment. After treatment patient left in no apparent distress:   Supine in bed and Call bell within reach    COMMUNICATION/COLLABORATION:   The patients plan of care was discussed with: Registered nurse.      Acute PT Goals and plan of care  I with LE HEP x7 days  SBA with supine to sit EOB x 7 days  CGA with sit to stand x 7 days  CGA with stand pivot from bed to chair x 7 days  Progress to amb 25-50ft with RW and CGAx1 x7 days        Haris Bergman   Time Calculation: 38 mins

## 2022-06-03 NOTE — PROGRESS NOTES
Hematology/Oncology   Progress Note    Patient: Luna Pickard MRN: [id number]     YOB: 1945  Age: 77 y.o. Sex: female      Admit Date: 6/2/2022    LOS: 1 day     Chief Complaint: Admitted with generalized weakness and fall    Subjective:   Feels better today. Denies pain. Constitutional No fevers, chills, night sweats, excessive fatigue or weight loss. Allergic/Immunologic No recent allergic reactions   Eyes No significant visual difficulties. No diplopia. ENMT No problems with hearing, no sore throat, no sinus drainage. Endocrine No hot flashes or night sweats. No cold intolerance, polyuria, or polydipsia   Hematologic/Lymphatic No easy bruising or bleeding. The patient denies any tender or palpable lymph nodes   Breasts No abnormal masses of breast, nipple discharge or pain. Respiratory No dyspnea on exertion, orthopnea, chest pain, cough or hemoptysis. Cardiovascular No anginal chest pain, irregular heart beat, tachycardia, palpitations or orthopnea. Gastrointestinal No nausea, vomiting, diarrhea, constipation, cramping, dysphagia, reflux, heartburn, GI bleeding, or early satiety. No change in bowel habits. Genitourinary (M) No hematuria, dysuria, increased frequency, urgency, hesitancy or incontinence. Musculoskeletal No joint pain, swelling or redness. No decreased range of motion. Integumentary No chronic rashes, inflammation, ulcerations, pruritus, petechiae, purpura, ecchymoses, or skin changes. Neurologic No headache, blurred vision, and no areas of focal weakness or numbness. Normal gait. No sensory problems. Psychiatric No insomnia, depression, magalis or mood swings. No psychotropic drugs.         Current Facility-Administered Medications:     potassium, sodium phosphates (NEUTRA-PHOS) packet 1 Packet, 1 Packet, Oral, TID, German Gupta MD, 1 Packet at 06/03/22 4103    anastrozole (ARIMIDEX) tablet 1 mg, 1 mg, Oral, DAILY, Marleni Vasquez MD, 1 mg at 06/03/22 1042    atorvastatin (LIPITOR) tablet 40 mg, 40 mg, Oral, QHS, Easton Vasquez MD    [facility]) tablet 20 mg, 20 mg, Oral, BID, Easton Vasquez MD, 20 mg at 06/03/22 1029    levothyroxine (SYNTHROID) tablet 50 mcg, 50 mcg, Oral, ACB, Easton Vasquez MD, 50 mcg at 06/03/22 1028    famotidine (PEPCID) tablet 20 mg, 20 mg, Oral, DAILY, Easton Vasquez MD, 20 mg at 06/03/22 1028    propranoloL (INDERAL) tablet 10 mg, 10 mg, Oral, BID, Easton Vasquez MD, 10 mg at 06/03/22 1028    sodium chloride (NS) flush 5-40 mL, 5-40 mL, IntraVENous, Q8H, Easton Vasquez MD, 10 mL at 06/03/22 0539    sodium chloride (NS) flush 5-40 mL, 5-40 mL, IntraVENous, PRN, Easton Huertas MD    acetaminophen (TYLENOL) tablet 650 mg, 650 mg, Oral, Q6H PRN **OR** acetaminophen (TYLENOL) suppository 650 mg, 650 mg, Rectal, Q6H PRN, Easton Vasquez MD    polyethylene glycol (MIRALAX) packet 17 g, 17 g, Oral, DAILY PRN, Easton Vasquez MD    ondansetron (ZOFRAN ODT) tablet 4 mg, 4 mg, Oral, Q8H PRN **OR** ondansetron (ZOFRAN) injection 4 mg, 4 mg, IntraVENous, Q6H PRN, Easton Huertas MD, 4 mg at 06/02/22 0329    enoxaparin (LOVENOX) injection 40 mg, 40 mg, SubCUTAneous, DAILY, Easton Vasquez MD, 40 mg at 06/03/22 1040    cefTRIAXone (ROCEPHIN) 1 g in sterile water (preservative free) 10 mL IV syringe, 1 g, IntraVENous, Q24H, Nik Pruitt MD, 1 g at 06/03/22 0430     Objective:     Vitals:    06/02/22 1805 06/02/22 2000 06/02/22 2115 06/03/22 1033   BP: (!) 93/52 101/73 122/73 123/76   Pulse: 75 67 71 68   Resp: 20 18 18 18   Temp: 98 °F (36.7 °C)  97.5 °F (36.4 °C) 97.5 °F (36.4 °C)   SpO2: 98% 97% 97% 96%   Weight:       Height:              Physical Exam:   Constitutional Alert, cooperative, oriented. Mood and affect appropriate. Appears close to chronological age. Well nourished. Well developed. Head Normocephalic; no scars   Eyes Conjunctivae and sclerae are clear and without icterus.  Pupils are reactive and equal. ENMT Sinuses are nontender. No oral exudates, ulcers, masses, thrush or mucositis. Oropharynx clear. Tongue normal.   Neck Supple without masses or thyromegaly. No jugular venous distension. Hematologic/Lymphatic No petechiae or purpura. No tender or palpable lymph nodes in the cervical, supraclavicular, axillary or inguinal area. Respiratory Lungs are clear to auscultation without rhonchi or wheezing. Cardiovascular Regular rate and rhythm of heart without murmurs, gallops or rubs. Chest / Line Site Chest is symmetric with no chest wall deformities. Abdomen Non-tender, non-distended, no masses, ascites or hepatosplenomegaly. Good bowel sounds. No guarding or rebound tenderness. No pulsatile masses. Musculoskeletal No tenderness or swelling, normal range of motion without obvious weakness. Extremities No visible deformities, no cyanosis, clubbing or edema. Skin No rashes, scars, or lesions suggestive of malignancy. No petechiae, purpura, or ecchymoses. No excoriations. Neurologic No sensory or motor deficits, normal cerebellar function, normal gait, cranial nerves intact. Psychiatric Alert and oriented times three. Coherent speech. Verbalizes understanding of our discussions today. Lab/Data Review:  Recent Labs     06/03/22  0838 06/02/22  0044   WBC 7.9 13.7*   HGB 11.3* 14.2   HCT 32.9* 40.8    301     Recent Labs     06/03/22  0838 06/02/22 0044     141 138   K 3.7  3.6 4.2   *  114* 108   CO2 20*  19* 19*   GLU 90  92 113*   BUN 10  9 18   CREA 0.66  0.80 1.19*   CA 8.4*  8.2* 9.4   PHOS 2.3*  --    ALB 2.8* 3.9   TBILI  --  1.9*   ALT  --  33     No results for input(s): PH, PCO2, PO2, HCO3, FIO2 in the last 72 hours.   Recent Results (from the past 24 hour(s))   CBC WITH AUTOMATED DIFF    Collection Time: 06/03/22  8:38 AM   Result Value Ref Range    WBC 7.9 3.6 - 11.0 K/uL    RBC 3.56 (L) 3.80 - 5.20 M/uL    HGB 11.3 (L) 11.5 - 16.0 g/dL    HCT 32.9 (L) 35.0 - 47.0 %    MCV 92.4 80.0 - 99.0 FL    MCH 31.7 26.0 - 34.0 PG    MCHC 34.3 30.0 - 36.5 g/dL    RDW 13.2 11.5 - 14.5 %    PLATELET 491 258 - 359 K/uL    MPV 9.5 8.9 - 12.9 FL    NRBC 0.0 0.0  WBC    ABSOLUTE NRBC 0.00 0.00 - 0.01 K/uL    NEUTROPHILS 69 32 - 75 %    LYMPHOCYTES 22 12 - 49 %    MONOCYTES 6 5 - 13 %    EOSINOPHILS 2 0 - 7 %    BASOPHILS 1 0 - 1 %    IMMATURE GRANULOCYTES 0 0 - 0.5 %    ABS. NEUTROPHILS 5.6 1.8 - 8.0 K/UL    ABS. LYMPHOCYTES 1.7 0.8 - 3.5 K/UL    ABS. MONOCYTES 0.4 0.0 - 1.0 K/UL    ABS. EOSINOPHILS 0.1 0.0 - 0.4 K/UL    ABS. BASOPHILS 0.1 0.0 - 0.1 K/UL    ABS. IMM.  GRANS. 0.0 0.00 - 0.04 K/UL    DF AUTOMATED     METABOLIC PANEL, BASIC    Collection Time: 06/03/22  8:38 AM   Result Value Ref Range    Sodium 140 136 - 145 mmol/L    Potassium 3.7 3.5 - 5.1 mmol/L    Chloride 115 (H) 97 - 108 mmol/L    CO2 20 (L) 21 - 32 mmol/L    Anion gap 5 5 - 15 mmol/L    Glucose 90 65 - 100 mg/dL    BUN 10 6 - 20 mg/dL    Creatinine 0.66 0.55 - 1.02 mg/dL    BUN/Creatinine ratio 15 12 - 20      GFR est AA >60 >60 ml/min/1.73m2    GFR est non-AA >60 >60 ml/min/1.73m2    Calcium 8.4 (L) 8.5 - 10.1 mg/dL   RENAL FUNCTION PANEL    Collection Time: 06/03/22  8:38 AM   Result Value Ref Range    Sodium 141 136 - 145 mmol/L    Potassium 3.6 3.5 - 5.1 mmol/L    Chloride 114 (H) 97 - 108 mmol/L    CO2 19 (L) 21 - 32 mmol/L    Anion gap 8 5 - 15 mmol/L    Glucose 92 65 - 100 mg/dL    BUN 9 6 - 20 mg/dL    Creatinine 0.80 0.55 - 1.02 mg/dL    BUN/Creatinine ratio 11 (L) 12 - 20      GFR est AA >60 >60 ml/min/1.73m2    GFR est non-AA >60 >60 ml/min/1.73m2    Calcium 8.2 (L) 8.5 - 10.1 mg/dL    Phosphorus 2.3 (L) 2.6 - 4.7 mg/dL    Albumin 2.8 (L) 3.5 - 5.0 g/dL   CK    Collection Time: 06/03/22  8:38 AM   Result Value Ref Range     (H) 26 - 192 U/L   COVID-19 RAPID TEST    Collection Time: 06/03/22  1:49 PM   Result Value Ref Range    COVID-19 rapid test Not Detected Not Detected Radiology:   CT Results  (Last 48 hours)               06/02/22 0110  CT HEAD WO CONT Final result    Impression:  1. No findings of acute intracranial abnormality. 2. Nonspecific white matter findings that are most commonly the result of   chronic microangiopathy. 3. 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 Narrative:  Exam: CT Head without contrast       TECHNIQUE: Multiple transaxial CT images of the head were obtained without   contrast. Coronal and sagittal reformatted images were provided. Dose reduction: All CT scans at this facility are performed using dose reduction   optimization techniques as appropriate to a performed exam including the   following: Automated exposure control, adjustments of the mA and/or kV according   to patient size, or use of iterative reconstruction technique. HISTORY: fall       COMPARISON: CT head and neck angiogram 5/9/2021       FINDINGS:       Global parenchymal volume loss with associated proportional ex vacuo dilatation   of the ventricles and other extra-axial CSF spaces. Deep white matter   hypoattenuation, nonspecific, but most commonly the result of chronic   microangiopathy. There is likely a remote lacunar infarct within the right   subinsular region, similar to prior. No findings of acute large vessel territory   infarct. No findings of acute intracranial hemorrhage. Basilar cisterns appear   preserved. Mastoid air cells appear clear. There is mild mucosal thickening of the ethmoid   sinuses. Postoperative appearance of the globes with bilateral lens replacement. Calvarium appears intact without findings of acute or aggressive abnormality. Assessment and Plan:      Active Problems:    UTI (urinary tract infection) (5/10/2021)    Left-sided breast cancer:  -Follows with Dr. Herman Poe  -Status post lumpectomy sentinel lymph node biopsy on 1/2022 and noted to have multifocal T2 N0 tumor  -Declined adjuvant radiation therapy  -Currently on HER2 directed therapy with Herceptin and pertuzumab alone due to borderline performance status  -Received cycle 3 of treatment on 5/11/2022     UTI:  -On antibiotics per primary team     Rhabdomyolysis:  -Secondary to fall  -Management per primary team     Patient needs social work/case management consult as the patient lives alone. We will sign off. Follow-up with Dr. Shanon Alston following discharge.       Signed By: Manas Heredia MD     [place] 3, 2022

## 2022-06-04 LAB
ANION GAP SERPL CALC-SCNC: 7 MMOL/L (ref 5–15)
BACTERIA SPEC CULT: ABNORMAL
BASOPHILS # BLD: 0.1 K/UL (ref 0–0.1)
BASOPHILS NFR BLD: 1 % (ref 0–1)
BUN SERPL-MCNC: 7 MG/DL (ref 6–20)
BUN/CREAT SERPL: 11 (ref 12–20)
CA-I BLD-MCNC: 8.5 MG/DL (ref 8.5–10.1)
CHLORIDE SERPL-SCNC: 114 MMOL/L (ref 97–108)
CO2 SERPL-SCNC: 22 MMOL/L (ref 21–32)
COLONY COUNT,CNT: ABNORMAL
COLONY COUNT,CNT: ABNORMAL
CREAT SERPL-MCNC: 0.65 MG/DL (ref 0.55–1.02)
DIFFERENTIAL METHOD BLD: ABNORMAL
EOSINOPHIL # BLD: 0.2 K/UL (ref 0–0.4)
EOSINOPHIL NFR BLD: 3 % (ref 0–7)
ERYTHROCYTE [DISTWIDTH] IN BLOOD BY AUTOMATED COUNT: 13.2 % (ref 11.5–14.5)
GLUCOSE SERPL-MCNC: 84 MG/DL (ref 65–100)
HCT VFR BLD AUTO: 32.8 % (ref 35–47)
HGB BLD-MCNC: 11.3 G/DL (ref 11.5–16)
IMM GRANULOCYTES # BLD AUTO: 0 K/UL (ref 0–0.04)
IMM GRANULOCYTES NFR BLD AUTO: 0 % (ref 0–0.5)
LYMPHOCYTES # BLD: 1.6 K/UL (ref 0.8–3.5)
LYMPHOCYTES NFR BLD: 26 % (ref 12–49)
MCH RBC QN AUTO: 31.7 PG (ref 26–34)
MCHC RBC AUTO-ENTMCNC: 34.5 G/DL (ref 30–36.5)
MCV RBC AUTO: 92.1 FL (ref 80–99)
MONOCYTES # BLD: 0.4 K/UL (ref 0–1)
MONOCYTES NFR BLD: 6 % (ref 5–13)
NEUTS SEG # BLD: 4 K/UL (ref 1.8–8)
NEUTS SEG NFR BLD: 64 % (ref 32–75)
NRBC # BLD: 0 K/UL (ref 0–0.01)
NRBC BLD-RTO: 0 PER 100 WBC
PLATELET # BLD AUTO: 203 K/UL (ref 150–400)
PMV BLD AUTO: 9.4 FL (ref 8.9–12.9)
POTASSIUM SERPL-SCNC: 3.6 MMOL/L (ref 3.5–5.1)
RBC # BLD AUTO: 3.56 M/UL (ref 3.8–5.2)
SODIUM SERPL-SCNC: 143 MMOL/L (ref 136–145)
SPECIAL REQUESTS,SREQ: ABNORMAL
WBC # BLD AUTO: 6.2 K/UL (ref 3.6–11)

## 2022-06-04 PROCEDURE — 85025 COMPLETE CBC W/AUTO DIFF WBC: CPT

## 2022-06-04 PROCEDURE — 65270000029 HC RM PRIVATE

## 2022-06-04 PROCEDURE — 80048 BASIC METABOLIC PNL TOTAL CA: CPT

## 2022-06-04 PROCEDURE — 74011250636 HC RX REV CODE- 250/636: Performed by: INTERNAL MEDICINE

## 2022-06-04 PROCEDURE — 74011250637 HC RX REV CODE- 250/637: Performed by: INTERNAL MEDICINE

## 2022-06-04 PROCEDURE — 74011000250 HC RX REV CODE- 250: Performed by: INTERNAL MEDICINE

## 2022-06-04 PROCEDURE — 36415 COLL VENOUS BLD VENIPUNCTURE: CPT

## 2022-06-04 RX ORDER — MECLIZINE HYDROCHLORIDE 25 MG/1
25 TABLET ORAL
Status: DISCONTINUED | OUTPATIENT
Start: 2022-06-04 | End: 2022-06-06 | Stop reason: HOSPADM

## 2022-06-04 RX ADMIN — TROSPIUM CHLORIDE 20 MG: 20 TABLET, FILM COATED ORAL at 22:10

## 2022-06-04 RX ADMIN — FAMOTIDINE 20 MG: 20 TABLET ORAL at 08:34

## 2022-06-04 RX ADMIN — ENOXAPARIN SODIUM 40 MG: 100 INJECTION SUBCUTANEOUS at 08:34

## 2022-06-04 RX ADMIN — TROSPIUM CHLORIDE 20 MG: 20 TABLET, FILM COATED ORAL at 08:46

## 2022-06-04 RX ADMIN — PROPRANOLOL HYDROCHLORIDE 10 MG: 10 TABLET ORAL at 08:34

## 2022-06-04 RX ADMIN — SODIUM CHLORIDE, PRESERVATIVE FREE 10 ML: 5 INJECTION INTRAVENOUS at 22:11

## 2022-06-04 RX ADMIN — POTASSIUM & SODIUM PHOSPHATES POWDER PACK 280-160-250 MG 1 PACKET: 280-160-250 PACK at 16:00

## 2022-06-04 RX ADMIN — POTASSIUM & SODIUM PHOSPHATES POWDER PACK 280-160-250 MG 1 PACKET: 280-160-250 PACK at 22:10

## 2022-06-04 RX ADMIN — LEVOTHYROXINE SODIUM 50 MCG: 0.03 TABLET ORAL at 08:34

## 2022-06-04 RX ADMIN — PROPRANOLOL HYDROCHLORIDE 10 MG: 10 TABLET ORAL at 22:10

## 2022-06-04 RX ADMIN — ANASTROZOLE 1 MG: 1 TABLET, COATED ORAL at 08:49

## 2022-06-04 RX ADMIN — POTASSIUM & SODIUM PHOSPHATES POWDER PACK 280-160-250 MG 1 PACKET: 280-160-250 PACK at 08:34

## 2022-06-04 RX ADMIN — SODIUM CHLORIDE, PRESERVATIVE FREE 10 ML: 5 INJECTION INTRAVENOUS at 05:00

## 2022-06-04 RX ADMIN — WATER 1 G: 1 INJECTION INTRAMUSCULAR; INTRAVENOUS; SUBCUTANEOUS at 04:52

## 2022-06-04 RX ADMIN — ATORVASTATIN CALCIUM 40 MG: 40 TABLET, FILM COATED ORAL at 22:10

## 2022-06-04 RX ADMIN — SODIUM CHLORIDE, PRESERVATIVE FREE 10 ML: 5 INJECTION INTRAVENOUS at 14:00

## 2022-06-04 NOTE — PROGRESS NOTES
Hospitalist Progress Note         FRANCISCO JAVIER Mack FNP-C    Daily Progress Note: 6/4/2022      Subjective:   Subjective   Patient examined alert and confused lying in bed. Reports dizziness on examination. No acute distress noted on examination. No overnight events reported. Review of Systems:   Review of Systems   Constitutional: Negative for fever. Respiratory: Negative for cough. Cardiovascular: Negative for chest pain. Musculoskeletal: Positive for falls and myalgias. Neurological: Positive for dizziness and weakness. Objective:   Objective      Vitals:  Patient Vitals for the past 12 hrs:   Temp Pulse Resp BP SpO2   06/04/22 0743 97.8 °F (36.6 °C) 80 16 122/67 97 %   06/03/22 2108 97.8 °F (36.6 °C) 64 18 (!) 145/83 97 %        Physical Exam:  Physical Exam  Vitals and nursing note reviewed. Constitutional:       Appearance: Normal appearance. Eyes:      Extraocular Movements: Extraocular movements intact. Cardiovascular:      Rate and Rhythm: Normal rate. Heart sounds: Normal heart sounds. Pulmonary:      Breath sounds: Normal breath sounds. Abdominal:      General: Bowel sounds are normal.      Palpations: Abdomen is soft. Skin:     General: Skin is warm and dry. Capillary Refill: Capillary refill takes less than 2 seconds. Neurological:      Mental Status: She is alert. She is disoriented.    Psychiatric:      Comments: Reports seeing a man standing out her window looking at her all night          Lab Results:  Recent Results (from the past 24 hour(s))   CBC WITH AUTOMATED DIFF    Collection Time: 06/03/22  8:38 AM   Result Value Ref Range    WBC 7.9 3.6 - 11.0 K/uL    RBC 3.56 (L) 3.80 - 5.20 M/uL    HGB 11.3 (L) 11.5 - 16.0 g/dL    HCT 32.9 (L) 35.0 - 47.0 %    MCV 92.4 80.0 - 99.0 FL    MCH 31.7 26.0 - 34.0 PG    MCHC 34.3 30.0 - 36.5 g/dL    RDW 13.2 11.5 - 14.5 %    PLATELET 452 246 - 253 K/uL    MPV 9.5 8.9 - 12.9 FL    NRBC 0.0 0.0  WBC ABSOLUTE NRBC 0.00 0.00 - 0.01 K/uL    NEUTROPHILS 69 32 - 75 %    LYMPHOCYTES 22 12 - 49 %    MONOCYTES 6 5 - 13 %    EOSINOPHILS 2 0 - 7 %    BASOPHILS 1 0 - 1 %    IMMATURE GRANULOCYTES 0 0 - 0.5 %    ABS. NEUTROPHILS 5.6 1.8 - 8.0 K/UL    ABS. LYMPHOCYTES 1.7 0.8 - 3.5 K/UL    ABS. MONOCYTES 0.4 0.0 - 1.0 K/UL    ABS. EOSINOPHILS 0.1 0.0 - 0.4 K/UL    ABS. BASOPHILS 0.1 0.0 - 0.1 K/UL    ABS. IMM.  GRANS. 0.0 0.00 - 0.04 K/UL    DF AUTOMATED     METABOLIC PANEL, BASIC    Collection Time: 06/03/22  8:38 AM   Result Value Ref Range    Sodium 140 136 - 145 mmol/L    Potassium 3.7 3.5 - 5.1 mmol/L    Chloride 115 (H) 97 - 108 mmol/L    CO2 20 (L) 21 - 32 mmol/L    Anion gap 5 5 - 15 mmol/L    Glucose 90 65 - 100 mg/dL    BUN 10 6 - 20 mg/dL    Creatinine 0.66 0.55 - 1.02 mg/dL    BUN/Creatinine ratio 15 12 - 20      GFR est AA >60 >60 ml/min/1.73m2    GFR est non-AA >60 >60 ml/min/1.73m2    Calcium 8.4 (L) 8.5 - 10.1 mg/dL   RENAL FUNCTION PANEL    Collection Time: 06/03/22  8:38 AM   Result Value Ref Range    Sodium 141 136 - 145 mmol/L    Potassium 3.6 3.5 - 5.1 mmol/L    Chloride 114 (H) 97 - 108 mmol/L    CO2 19 (L) 21 - 32 mmol/L    Anion gap 8 5 - 15 mmol/L    Glucose 92 65 - 100 mg/dL    BUN 9 6 - 20 mg/dL    Creatinine 0.80 0.55 - 1.02 mg/dL    BUN/Creatinine ratio 11 (L) 12 - 20      GFR est AA >60 >60 ml/min/1.73m2    GFR est non-AA >60 >60 ml/min/1.73m2    Calcium 8.2 (L) 8.5 - 10.1 mg/dL    Phosphorus 2.3 (L) 2.6 - 4.7 mg/dL    Albumin 2.8 (L) 3.5 - 5.0 g/dL   CK    Collection Time: 06/03/22  8:38 AM   Result Value Ref Range     (H) 26 - 192 U/L   COVID-19 RAPID TEST    Collection Time: 06/03/22  1:49 PM   Result Value Ref Range    COVID-19 rapid test Not Detected Not Detected            Diagnostic Images:  CT Results  (Last 48 hours)    None          Current Medications:    Current Facility-Administered Medications:     potassium, sodium phosphates (NEUTRA-PHOS) packet 1 Packet, 1 Packet, Oral, TID, Taty Holguin MD, 1 Packet at 06/03/22 2120    anastrozole (ARIMIDEX) tablet 1 mg, 1 mg, Oral, DAILY, Vivienne Vasquez MD, 1 mg at 06/03/22 1042    atorvastatin (LIPITOR) tablet 40 mg, 40 mg, Oral, QHS, Vivienne Vasquez MD, 40 mg at 06/03/22 2119    trospium (SANCTURA) tablet 20 mg, 20 mg, Oral, BID, Vivienne Vasquez MD, 20 mg at 06/03/22 2249    levothyroxine (SYNTHROID) tablet 50 mcg, 50 mcg, Oral, ACB, Vivienne Vasquez MD, 50 mcg at 06/03/22 1028    famotidine (PEPCID) tablet 20 mg, 20 mg, Oral, DAILY, Vivienne Vasquez MD, 20 mg at 06/03/22 1028    propranoloL (INDERAL) tablet 10 mg, 10 mg, Oral, BID, Vivienne Vasquez MD, 10 mg at 06/03/22 2119    sodium chloride (NS) flush 5-40 mL, 5-40 mL, IntraVENous, Q8H, Dillon Vasquez MD, 10 mL at 06/04/22 0500    sodium chloride (NS) flush 5-40 mL, 5-40 mL, IntraVENous, PRN, Vivienne Vasquez MD    acetaminophen (TYLENOL) tablet 650 mg, 650 mg, Oral, Q6H PRN **OR** acetaminophen (TYLENOL) suppository 650 mg, 650 mg, Rectal, Q6H PRN, Vivienne Vasquez MD    polyethylene glycol (MIRALAX) packet 17 g, 17 g, Oral, DAILY PRN, Vivienne Vasquez MD    ondansetron (ZOFRAN ODT) tablet 4 mg, 4 mg, Oral, Q8H PRN **OR** ondansetron (ZOFRAN) injection 4 mg, 4 mg, IntraVENous, Q6H PRN, Vivienne Vasquez MD, 4 mg at 06/03/22 2120    enoxaparin (LOVENOX) injection 40 mg, 40 mg, SubCUTAneous, DAILY, Vivienne Vasquez MD, 40 mg at 06/03/22 1040    cefTRIAXone (ROCEPHIN) 1 g in sterile water (preservative free) 10 mL IV syringe, 1 g, IntraVENous, Q24H, Josefina Humphrey MD, 1 g at 06/04/22 0312       ASSESSMENT:   Rosenda Ramirez a 77 y. o. female with PMH of breast cancer, hypothyroidism, essential tremor, and HLP. Limited history obtained as patient is hard of hearing and mildly demented.  Per ED note, Patient reports that she fell earlier today and could not get up. Fabienne Julien is walking minimal distances using a walker.  She lives by herself with home assistance. Associated with dizziness and generalized weakness. Denies any preceding symptoms of dizziness, chest pain, shortness of breath. Was recently diagnosed with breast cancer but missed her chemo last week. [place] [facility] [place], noted to have leukocytosis, DEWEY and UTI. 6/2/22 doing well with no new complaints  Rhabdomyolysis: CK remains markedly elevated, management with intravenous fluids. Urinary infection: ceftriaxone for now pending urine culture data. 6/3/22 reports continued right shoulder pains, XR shows degenerative changes no fx. 1. UTI  UA shows nitrites, ly est., bacteria, hyaline cast  Follow ucx, prelim gram neg   Continue IV antbx therapy    2. Rhabdomyolysis secondary to fall  Improved s/p IV hydration  Continue to monitor    3. Acute kidney injury  Secondary to above  Improved s/p IV hydration    4. Hypertension  BP currently at goal  Continue propanolol    5. Left Breast cancer on chemo  S/p lumpectomy  \"Currently on HER2 directed therapy with Herceptin and pertuzumab alone\"  Oncology following     6. Hypothyroidism  Managed with synthroid 50mcg    7. Remote CVA  Noted on head CT  Obtain neuro eval  PT/OT eval and treat        PLAN:  Follow ucx  IV antbx therapy  Neuro evaluation  Therapy evaluation  CM dispo planning  Meclizine prn for dizziness        Full Code  Dvt Prophylaxis lovenox  GI Prophylaxis pepcid        Above treatment plan reviewed and discussed with patient and family Janet Ramosheim in detail at bedside, all questions answered. Care Plan discussed with: Interdisciplinary team    Total time spent with patient: 35 minutes.     Maynor Glover NP

## 2022-06-04 NOTE — PROGRESS NOTES
Renal Progress Note    Patient: Corbin Muhammad MRN: [id number]  SSN: [id number]    YOB: 1945  Age: 77 y.o. Sex: female      Admit Date: 6/2/2022    LOS: 2 days     Subjective:   Patient seen at bedside. Alert and awake, no acute distress. Confused, No swelling in lower extremities. Creat down to 0.6        Current Facility-Administered Medications   Medication Dose Route Frequency    meclizine (ANTIVERT) tablet 25 mg  25 mg Oral Q6H PRN    potassium, sodium phosphates (NEUTRA-PHOS) packet 1 Packet  1 Packet Oral TID    anastrozole (ARIMIDEX) tablet 1 mg  1 mg Oral DAILY    atorvastatin (LIPITOR) tablet 40 mg  40 mg Oral QHS    trospium (SANCTURA) tablet 20 mg  20 mg Oral BID    levothyroxine (SYNTHROID) tablet 50 mcg  50 mcg Oral ACB    famotidine (PEPCID) tablet 20 mg  20 mg Oral DAILY    propranoloL (INDERAL) tablet 10 mg  10 mg Oral BID    sodium chloride (NS) flush 5-40 mL  5-40 mL IntraVENous Q8H    sodium chloride (NS) flush 5-40 mL  5-40 mL IntraVENous PRN    acetaminophen (TYLENOL) tablet 650 mg  650 mg Oral Q6H PRN    Or    acetaminophen (TYLENOL) suppository 650 mg  650 mg Rectal Q6H PRN    polyethylene glycol (MIRALAX) packet 17 g  17 g Oral DAILY PRN    ondansetron (ZOFRAN ODT) tablet 4 mg  4 mg Oral Q8H PRN    Or    ondansetron (ZOFRAN) injection 4 mg  4 mg IntraVENous Q6H PRN    enoxaparin (LOVENOX) injection 40 mg  40 mg SubCUTAneous DAILY    cefTRIAXone (ROCEPHIN) 1 g in sterile water (preservative free) 10 mL IV syringe  1 g IntraVENous Q24H        Vitals:    06/03/22 1033 06/03/22 2108 06/04/22 0743 06/04/22 1417   BP: 123/76 (!) 145/83 122/67 116/77   Pulse: 68 64 80 74   Resp: 18 18 16 18   Temp: 97.5 °F (36.4 °C) 97.8 °F (36.6 °C) 97.8 °F (36.6 °C) 97.8 °F (36.6 °C)   SpO2: 96% 97% 97% 98%   Weight:       Height:         Objective:   General: alert awake , no acute distress.   HEENT: EOMI, no Icterus, no Pallor, pupils reactive, mucosa dry,   Neck: Neck is supple, No JVD,   Lungs: breathsounds normal,  no rhonchi, no rales,  CVS: heart sounds normal,  no murmurs, no rubs. GI: soft, nontender, normal BS, no palpable organomegaly  Extremeties: no cyanosis, no edema,  Neuro: Alert, awake, hard of hearing but understanding conversation, following commands , confusion+  skin: normal skin turgor, no skin rashes. Intake and Output:  Current Shift: No intake/output data recorded. Last three shifts: 06/03 0701 - 06/04 1900  In: -   Out: 650 [Urine:650]      Lab/Data Review:  Recent Labs     06/04/22  0804 06/03/22  0838 06/02/22  0044   WBC 6.2 7.9 13.7*   HGB 11.3* 11.3* 14.2   HCT 32.8* 32.9* 40.8    211 301     Recent Labs     06/04/22  0804 06/03/22  0838 06/02/22  0044    140  141 138   K 3.6 3.7  3.6 4.2   * 115*  114* 108   CO2 22 20*  19* 19*   GLU 84 90  92 113*   BUN 7 10  9 18   CREA 0.65 0.66  0.80 1.19*   CA 8.5 8.4*  8.2* 9.4   PHOS  --  2.3*  --    ALB  --  2.8* 3.9   TBILI  --   --  1.9*   ALT  --   --  33     No results for input(s): PH, PCO2, PO2, HCO3, FIO2 in the last 72 hours. Recent Results (from the past 24 hour(s))   CBC WITH AUTOMATED DIFF    Collection Time: 06/04/22  8:04 AM   Result Value Ref Range    WBC 6.2 3.6 - 11.0 K/uL    RBC 3.56 (L) 3.80 - 5.20 M/uL    HGB 11.3 (L) 11.5 - 16.0 g/dL    HCT 32.8 (L) 35.0 - 47.0 %    MCV 92.1 80.0 - 99.0 FL    MCH 31.7 26.0 - 34.0 PG    MCHC 34.5 30.0 - 36.5 g/dL    RDW 13.2 11.5 - 14.5 %    PLATELET 127 256 - 919 K/uL    MPV 9.4 8.9 - 12.9 FL    NRBC 0.0 0.0  WBC    ABSOLUTE NRBC 0.00 0.00 - 0.01 K/uL    NEUTROPHILS 64 32 - 75 %    LYMPHOCYTES 26 12 - 49 %    MONOCYTES 6 5 - 13 %    EOSINOPHILS 3 0 - 7 %    BASOPHILS 1 0 - 1 %    IMMATURE GRANULOCYTES 0 0 - 0.5 %    ABS. NEUTROPHILS 4.0 1.8 - 8.0 K/UL    ABS. LYMPHOCYTES 1.6 0.8 - 3.5 K/UL    ABS. MONOCYTES 0.4 0.0 - 1.0 K/UL    ABS. EOSINOPHILS 0.2 0.0 - 0.4 K/UL    ABS. BASOPHILS 0.1 0.0 - 0.1 K/UL    ABS. IMM. GRANS. 0.0 0.00 - 0.04 K/UL    DF AUTOMATED     METABOLIC PANEL, BASIC    Collection Time: 06/04/22  8:04 AM   Result Value Ref Range    Sodium 143 136 - 145 mmol/L    Potassium 3.6 3.5 - 5.1 mmol/L    Chloride 114 (H) 97 - 108 mmol/L    CO2 22 21 - 32 mmol/L    Anion gap 7 5 - 15 mmol/L    Glucose 84 65 - 100 mg/dL    BUN 7 6 - 20 mg/dL    Creatinine 0.65 0.55 - 1.02 mg/dL    BUN/Creatinine ratio 11 (L) 12 - 20      GFR est AA >60 >60 ml/min/1.73m2    GFR est non-AA >60 >60 ml/min/1.73m2    Calcium 8.5 8.5 - 10.1 mg/dL        Assessment and Plan:     1. Acute Kidney Injury : probably prerenal azotemia secondary to borderline hypotension/?  Dehydration   Improved renal functions with IV hydration  Improved CPK levels  off IV fluids  Continue to monitor renal functions    2. Rhabdomyolysis: Initial CPK level 1325, down to 384   Probably related to trauma with fall  No need for further follow up of cpks     3. Hypertension: Patient has borderline hypotension, might have contributed to her fall  Improved hemodynamic status  continue to monitor blood pressures     4. History of breast cancer, on chemotherapy following with oncology     5. Hypothyroidism: On levothyroxine, normal TSH level    6.  Hypophos: added po neutrophos for 6 doses      Signed By: Delilah Hays MD     June 4, 2022

## 2022-06-04 NOTE — PROGRESS NOTES
Problem: Falls - Risk of  Goal: *Absence of Falls  Description: Document Shannon Mcburney Fall Risk and appropriate interventions in the flowsheet.   Outcome: Progressing Towards Goal  Note: Fall Risk Interventions:  Mobility Interventions: Bed/chair exit alarm,OT consult for ADLs,Patient to call before getting OOB,PT Consult for mobility concerns,Strengthening exercises (ROM-active/passive),Utilize walker, cane, or other assistive device              Elimination Interventions: Bed/chair exit alarm,Call light in reach,Toileting schedule/hourly rounds    History of Falls Interventions: Bed/chair exit alarm,Door open when patient unattended

## 2022-06-05 LAB
ALBUMIN SERPL-MCNC: 2.8 G/DL (ref 3.5–5)
ANION GAP SERPL CALC-SCNC: 5 MMOL/L (ref 5–15)
BASOPHILS # BLD: 0.1 K/UL (ref 0–0.1)
BASOPHILS NFR BLD: 1 % (ref 0–1)
BUN SERPL-MCNC: 5 MG/DL (ref 6–20)
BUN/CREAT SERPL: 7 (ref 12–20)
CA-I BLD-MCNC: 8.5 MG/DL (ref 8.5–10.1)
CHLORIDE SERPL-SCNC: 111 MMOL/L (ref 97–108)
CO2 SERPL-SCNC: 25 MMOL/L (ref 21–32)
CREAT SERPL-MCNC: 0.74 MG/DL (ref 0.55–1.02)
DIFFERENTIAL METHOD BLD: ABNORMAL
EOSINOPHIL # BLD: 0.2 K/UL (ref 0–0.4)
EOSINOPHIL NFR BLD: 3 % (ref 0–7)
ERYTHROCYTE [DISTWIDTH] IN BLOOD BY AUTOMATED COUNT: 13.2 % (ref 11.5–14.5)
GLUCOSE SERPL-MCNC: 88 MG/DL (ref 65–100)
HCT VFR BLD AUTO: 34.7 % (ref 35–47)
HGB BLD-MCNC: 12 G/DL (ref 11.5–16)
IMM GRANULOCYTES # BLD AUTO: 0 K/UL (ref 0–0.04)
IMM GRANULOCYTES NFR BLD AUTO: 0 % (ref 0–0.5)
LYMPHOCYTES # BLD: 1.7 K/UL (ref 0.8–3.5)
LYMPHOCYTES NFR BLD: 31 % (ref 12–49)
MCH RBC QN AUTO: 31.7 PG (ref 26–34)
MCHC RBC AUTO-ENTMCNC: 34.6 G/DL (ref 30–36.5)
MCV RBC AUTO: 91.8 FL (ref 80–99)
MONOCYTES # BLD: 0.4 K/UL (ref 0–1)
MONOCYTES NFR BLD: 8 % (ref 5–13)
NEUTS SEG # BLD: 3.1 K/UL (ref 1.8–8)
NEUTS SEG NFR BLD: 57 % (ref 32–75)
NRBC # BLD: 0 K/UL (ref 0–0.01)
NRBC BLD-RTO: 0 PER 100 WBC
PHOSPHATE SERPL-MCNC: 2.8 MG/DL (ref 2.6–4.7)
PLATELET # BLD AUTO: 232 K/UL (ref 150–400)
PMV BLD AUTO: 9.2 FL (ref 8.9–12.9)
POTASSIUM SERPL-SCNC: 3.9 MMOL/L (ref 3.5–5.1)
RBC # BLD AUTO: 3.78 M/UL (ref 3.8–5.2)
SODIUM SERPL-SCNC: 141 MMOL/L (ref 136–145)
WBC # BLD AUTO: 5.4 K/UL (ref 3.6–11)

## 2022-06-05 PROCEDURE — 74011250637 HC RX REV CODE- 250/637: Performed by: INTERNAL MEDICINE

## 2022-06-05 PROCEDURE — 51798 US URINE CAPACITY MEASURE: CPT

## 2022-06-05 PROCEDURE — 74011250636 HC RX REV CODE- 250/636: Performed by: INTERNAL MEDICINE

## 2022-06-05 PROCEDURE — 80069 RENAL FUNCTION PANEL: CPT

## 2022-06-05 PROCEDURE — 85025 COMPLETE CBC W/AUTO DIFF WBC: CPT

## 2022-06-05 PROCEDURE — 36415 COLL VENOUS BLD VENIPUNCTURE: CPT

## 2022-06-05 PROCEDURE — 74011000250 HC RX REV CODE- 250: Performed by: INTERNAL MEDICINE

## 2022-06-05 PROCEDURE — 65270000029 HC RM PRIVATE

## 2022-06-05 RX ORDER — GUAIFENESIN 100 MG/5ML
81 LIQUID (ML) ORAL DAILY
Status: DISCONTINUED | OUTPATIENT
Start: 2022-06-06 | End: 2022-06-06 | Stop reason: HOSPADM

## 2022-06-05 RX ADMIN — TROSPIUM CHLORIDE 20 MG: 20 TABLET, FILM COATED ORAL at 23:03

## 2022-06-05 RX ADMIN — POTASSIUM & SODIUM PHOSPHATES POWDER PACK 280-160-250 MG 1 PACKET: 280-160-250 PACK at 08:03

## 2022-06-05 RX ADMIN — ANASTROZOLE 1 MG: 1 TABLET, COATED ORAL at 08:14

## 2022-06-05 RX ADMIN — SODIUM CHLORIDE, PRESERVATIVE FREE 10 ML: 5 INJECTION INTRAVENOUS at 05:00

## 2022-06-05 RX ADMIN — WATER 1 G: 1 INJECTION INTRAMUSCULAR; INTRAVENOUS; SUBCUTANEOUS at 04:53

## 2022-06-05 RX ADMIN — TROSPIUM CHLORIDE 20 MG: 20 TABLET, FILM COATED ORAL at 12:42

## 2022-06-05 RX ADMIN — LEVOTHYROXINE SODIUM 50 MCG: 0.03 TABLET ORAL at 08:03

## 2022-06-05 RX ADMIN — ENOXAPARIN SODIUM 40 MG: 100 INJECTION SUBCUTANEOUS at 08:03

## 2022-06-05 RX ADMIN — PROPRANOLOL HYDROCHLORIDE 10 MG: 10 TABLET ORAL at 08:03

## 2022-06-05 RX ADMIN — FAMOTIDINE 20 MG: 20 TABLET ORAL at 08:03

## 2022-06-05 RX ADMIN — SODIUM CHLORIDE, PRESERVATIVE FREE 10 ML: 5 INJECTION INTRAVENOUS at 23:03

## 2022-06-05 RX ADMIN — ATORVASTATIN CALCIUM 40 MG: 40 TABLET, FILM COATED ORAL at 23:03

## 2022-06-05 NOTE — PROGRESS NOTES
Hospitalist Progress Note         FRANCISCO JAVIER Lucio, FNP-C    Daily Progress Note: 6/5/2022      Subjective:   Subjective   Patient examined alert and confused lying in bed. No acute distress noted on examination. No overnight events reported. Review of Systems:   Review of Systems   Constitutional: Negative for fever. Respiratory: Negative for cough. Cardiovascular: Negative for chest pain. Musculoskeletal: Positive for falls and myalgias. Neurological: Positive for dizziness and weakness. Objective:   Objective      Vitals:  Patient Vitals for the past 12 hrs:   Temp Pulse Resp BP SpO2   06/05/22 0754 98.4 °F (36.9 °C) 78 18 138/78 95 %        Physical Exam:  Physical Exam  Vitals and nursing note reviewed. Constitutional:       Appearance: Normal appearance. Eyes:      Extraocular Movements: Extraocular movements intact. Cardiovascular:      Rate and Rhythm: Normal rate. Pulmonary:      Breath sounds: Normal breath sounds. Abdominal:      General: Bowel sounds are normal.      Palpations: Abdomen is soft. Skin:     General: Skin is warm and dry. Capillary Refill: Capillary refill takes less than 2 seconds. Neurological:      Mental Status: She is alert. She is disoriented. Lab Results:  Recent Results (from the past 24 hour(s))   CBC WITH AUTOMATED DIFF    Collection Time: 06/05/22  8:24 AM   Result Value Ref Range    WBC 5.4 3.6 - 11.0 K/uL    RBC 3.78 (L) 3.80 - 5.20 M/uL    HGB 12.0 11.5 - 16.0 g/dL    HCT 34.7 (L) 35.0 - 47.0 %    MCV 91.8 80.0 - 99.0 FL    MCH 31.7 26.0 - 34.0 PG    MCHC 34.6 30.0 - 36.5 g/dL    RDW 13.2 11.5 - 14.5 %    PLATELET 192 031 - 346 K/uL    MPV 9.2 8.9 - 12.9 FL    NRBC 0.0 0.0  WBC    ABSOLUTE NRBC 0.00 0.00 - 0.01 K/uL    NEUTROPHILS 57 32 - 75 %    LYMPHOCYTES 31 12 - 49 %    MONOCYTES 8 5 - 13 %    EOSINOPHILS 3 0 - 7 %    BASOPHILS 1 0 - 1 %    IMMATURE GRANULOCYTES 0 0 - 0.5 %    ABS.  NEUTROPHILS 3.1 1.8 - 8.0 K/UL ABS. LYMPHOCYTES 1.7 0.8 - 3.5 K/UL    ABS. MONOCYTES 0.4 0.0 - 1.0 K/UL    ABS. EOSINOPHILS 0.2 0.0 - 0.4 K/UL    ABS. BASOPHILS 0.1 0.0 - 0.1 K/UL    ABS. IMM.  GRANS. 0.0 0.00 - 0.04 K/UL    DF AUTOMATED     RENAL FUNCTION PANEL    Collection Time: 06/05/22  8:24 AM   Result Value Ref Range    Sodium 141 136 - 145 mmol/L    Potassium 3.9 3.5 - 5.1 mmol/L    Chloride 111 (H) 97 - 108 mmol/L    CO2 25 21 - 32 mmol/L    Anion gap 5 5 - 15 mmol/L    Glucose 88 65 - 100 mg/dL    BUN 5 (L) 6 - 20 mg/dL    Creatinine 0.74 0.55 - 1.02 mg/dL    BUN/Creatinine ratio 7 (L) 12 - 20      GFR est AA >60 >60 ml/min/1.73m2    GFR est non-AA >60 >60 ml/min/1.73m2    Calcium 8.5 8.5 - 10.1 mg/dL    Phosphorus 2.8 2.6 - 4.7 mg/dL    Albumin 2.8 (L) 3.5 - 5.0 g/dL          Diagnostic Images:  CT Results  (Last 48 hours)    None          Current Medications:    Current Facility-Administered Medications:     meclizine (ANTIVERT) tablet 25 mg, 25 mg, Oral, Q6H PRN, Cristal Martin, MITCH    anastrozole (ARIMIDEX) tablet 1 mg, 1 mg, Oral, DAILY, Kyra Vasquez MD, 1 mg at 06/05/22 1631    atorvastatin (LIPITOR) tablet 40 mg, 40 mg, Oral, QHS, Cha, Ming Sydell Goodell, MD, 40 mg at 06/04/22 2210    trospium (SANCTURA) tablet 20 mg, 20 mg, Oral, BID, Dillon Vasquez MD, 20 mg at 06/04/22 2210    levothyroxine (SYNTHROID) tablet 50 mcg, 50 mcg, Oral, ACB, Kyra Vasquez MD, 50 mcg at 06/05/22 0803    famotidine (PEPCID) tablet 20 mg, 20 mg, Oral, DAILY, Kyra Vasquez MD, 20 mg at 06/05/22 0803    [Held by provider] propranoloL (INDERAL) tablet 10 mg, 10 mg, Oral, BID, Dillon Vasquez MD, 10 mg at 06/05/22 0803    sodium chloride (NS) flush 5-40 mL, 5-40 mL, IntraVENous, Q8H, Dillon Vasquez MD, 10 mL at 06/05/22 0500    sodium chloride (NS) flush 5-40 mL, 5-40 mL, IntraVENous, PRN, Christina, Kyra Poe MD    acetaminophen (TYLENOL) tablet 650 mg, 650 mg, Oral, Q6H PRN **OR** acetaminophen (TYLENOL) suppository 650 mg, 650 mg, Rectal, Q6H PRN, Christina, Carly Portillo MD    polyethylene glycol [facility]) packet 17 g, 17 g, Oral, DAILY PRN, Carly Vasquez MD    ondansetron (ZOFRAN ODT) tablet 4 mg, 4 mg, Oral, Q8H PRN **OR** ondansetron (ZOFRAN) injection 4 mg, 4 mg, IntraVENous, Q6H PRN, Carly Vasquez MD, 4 mg at 06/03/22 2120    enoxaparin (LOVENOX) injection 40 mg, 40 mg, SubCUTAneous, DAILY, Carly Vasquez MD, 40 mg at 06/05/22 0803    cefTRIAXone (ROCEPHIN) 1 g in sterile water (preservative free) 10 mL IV syringe, 1 g, IntraVENous, Q24H, Mickey Parker MD, 1 g at 06/05/22 1186       ASSESSMENT:   Boris Brambila a 77 y. o. female with PMH of breast cancer, hypothyroidism, essential tremor, and HLP. Limited history obtained as patient is hard of hearing and mildly demented. Per ED note, Patient reports that she fell earlier today and could not get up. Juhi Ayon is walking minimal distances using a walker.  She lives by herself with home assistance. Associated with dizziness and generalized weakness. Denies any preceding symptoms of dizziness, chest pain, shortness of breath. Was recently diagnosed with breast cancer but missed her chemo last week. [place] in [facility] [place] In [place], noted to have leukocytosis, DEWEY and UTI. 6/2/22 doing well with no new complaints  Rhabdomyolysis: CK remains markedly elevated, management with intravenous fluids. Urinary infection: ceftriaxone for now pending urine culture data. 6/3/22 reports continued right shoulder pains, XR shows degenerative changes no fx. UA shows nitrites, ly est., bacteria, hyaline cast. UCX grew klebsiella, bcx neg. Remote CVA noted on head CT. Obtain neuro eval. PT/OT reccs [facility]. CM for dispo planning        1. UTI  UA shows nitrites, ly est., bacteria, hyaline cast  UCX grew klebsiella, bcx neg   Continue IV antbx therapy    2. Vertigo/dizziness  Obtain neuro eval  Continue meclizine  May warrant ENT eval    3. Rhabdomyolysis secondary to fall  Improved s/p IV hydration  Continue to monitor    4.  Acute kidney injury  Secondary to above  Improved s/p IV hydration    5. Hypertension  BP currently at goal  Continue propanolol    6. Left Breast cancer on chemo  S/p lumpectomy  \"Currently on HER2 directed therapy with Herceptin and pertuzumab alone\"  Oncology following     7. Hypothyroidism  Managed with synthroid 50mcg    8. Remote CVA  Noted on head CT  Obtain neuro eval  PT/OT reccs SNF        PLAN:  IV antbx therapy  Neuro evaluation  CM dispo planning SNF  Meclizine prn for dizziness        Full Code  Dvt Prophylaxis lovenox  GI Prophylaxis pepcid        Above treatment plan reviewed and discussed with patient in detail at bedside, all questions answered. Care Plan discussed with: Interdisciplinary team    Total time spent with patient: 35 minutes.     Villa Chisholm NP

## 2022-06-05 NOTE — CONSULTS
NEUROLOGY CONSULT    Name Antony Myers Age 77 y.o. MRN [id number]  1945     Referring Physician: Toni Forte MD    Chief Complaint: Remote infarct on CT scan of the head     This is a 77 y.o.  female extremely hard of hearing who was admitted on 22 after a fall and had a CT scan of the head which showed an old lacunar infarct in the right subinsular cortex and the primary team requested a consult on 2022 about this. It is very difficult to get any significant history from the patient but she denied any stroke to her knowledge. She does have these uncontrolled movements of the extremities much more pronounced on the right side than the left which she could not tell that time and he has been having those. Assessment and Plan:  1. Old lacunar infarct in the right subinsular cortex: This is an incidental finding and does not need any additional neurological work-up. The patient is on aspirin 81 mg a day which we can continue. 2.  Poor coordination with dysmetria of the upper extremities more than the lowers and right upper is much worse than the left, since probably due to the cerebellar atrophy. The details of her dysmetria are not available and if it is a congenital condition there is no specific available treatments. 3.  Rhabdomyolysis secondary to fall  4. History of left breast cancer on chemo  5.   Acute kidney injury improved with hydration    I will sign off the case for now, however, please do not hesitate to call if needed again    Thank you for the consult    Allergies   Allergen Reactions    Codeine Unknown (comments)     Current Facility-Administered Medications   Medication Dose Route Frequency    [START ON 2022] aspirin chewable tablet 81 mg  81 mg Oral DAILY    meclizine (ANTIVERT) tablet 25 mg  25 mg Oral Q6H PRN    anastrozole (ARIMIDEX) tablet 1 mg  1 mg Oral DAILY    atorvastatin (LIPITOR) tablet 40 mg  40 mg Oral QHS    trospium (SANCTURA) tablet 20 mg  20 mg Oral BID    levothyroxine (SYNTHROID) tablet 50 mcg  50 mcg Oral ACB    famotidine (PEPCID) tablet 20 mg  20 mg Oral DAILY    [Held by provider] propranoloL (INDERAL) tablet 10 mg  10 mg Oral BID    sodium chloride (NS) flush 5-40 mL  5-40 mL IntraVENous Q8H    sodium chloride (NS) flush 5-40 mL  5-40 mL IntraVENous PRN    acetaminophen (TYLENOL) tablet 650 mg  650 mg Oral Q6H PRN    Or    acetaminophen (TYLENOL) suppository 650 mg  650 mg Rectal Q6H PRN    polyethylene glycol (MIRALAX) packet 17 g  17 g Oral DAILY PRN    ondansetron (ZOFRAN ODT) tablet 4 mg  4 mg Oral Q8H PRN    Or    ondansetron (ZOFRAN) injection 4 mg  4 mg IntraVENous Q6H PRN    enoxaparin (LOVENOX) injection 40 mg  40 mg SubCUTAneous DAILY    cefTRIAXone (ROCEPHIN) 1 g in sterile water (preservative free) 10 mL IV syringe  1 g IntraVENous Q24H     Past Medical History:   Diagnosis Date    Essential tremor     Hypercholesterolemia     Hypothyroidism     Urinary incontinence      Social History     Tobacco Use    Smoking status: Never Smoker    Smokeless tobacco: Never Used   Substance Use Topics    Alcohol use: Not Currently    Drug use: Not Currently     Exam  Visit Vitals  /78   Pulse 78   Temp 98.4 °F (36.9 °C)   Resp 18   Ht 5' 2\" (1.575 m)   Wt 54.4 kg (120 lb)   SpO2 95%   Breastfeeding No   BMI 21.95 kg/m²     General: Well developed, well nourished. Patient in no distress   Head: Normocephalic, atraumatic, anicteric sclera   Neck Normal ROM, No thyromegally   Lungs:  Clear to auscultation    Cardiac: Regular rate and rhythm with no murmurs. Abd: Bowel sounds were audible   Ext: No pedal edema   Skin: Supple no rash     NeurologicExam:  Mental Status: Alert, awake and extremely hard of hearing   Speech: Fluent no aphasia or dysarthria.    Cranial Nerves:   Pupils are equal round and reactive to light and accommodation, extraocular movements are intact and full, visual fields are intact by confrontation, no nystagmus noted, face is symmetric, sensation in face is intact and symmetric, hearing is intact and symmetric, tongue and uvula are in midline with normal movements, palate is elevating equally, shoulder shrug is intact and symmetric. Motor:  Full and symmetric strength of upper and lower ext. Normal bulk and tone. Reflexes:   Deep tendon reflexes 2/4 and symmetric. Sensory:   Symmetric and intact    Gait:  Gait is deferred   Tremor:   No tremor noted. Cerebellar:  Coordination: Significant dysmetria of both upper extremities right more than the left noted. Neurovascular: No carotid bruits.  No JVD   Lab Review  Lab Results   Component Value Date/Time    WBC 5.4 06/05/2022 08:24 AM    HCT 34.7 (L) 06/05/2022 08:24 AM    HGB 12.0 06/05/2022 08:24 AM    PLATELET 230 18/15/8829 08:24 AM     Lab Results   Component Value Date/Time    Sodium 141 06/05/2022 08:24 AM    Potassium 3.9 06/05/2022 08:24 AM    Chloride 111 (H) 06/05/2022 08:24 AM    CO2 25 06/05/2022 08:24 AM    Glucose 88 06/05/2022 08:24 AM    BUN 5 (L) 06/05/2022 08:24 AM    Creatinine 0.74 06/05/2022 08:24 AM    Calcium 8.5 06/05/2022 08:24 AM     No results found for: B12LT, FOL, RBCF  Lab Results   Component Value Date/Time    LDL, calculated 44.6 05/10/2021 11:48 AM     No results found for: HBA1C, CFI5BALH, TKN5SPWN  No components found for: TROPQUANT  No results found for: BONNIE

## 2022-06-05 NOTE — PROGRESS NOTES
Renal Progress Note    Patient: Kunal Conteh MRN: [id number]  SSN: [id number]    YOB: 1945  Age: 77 y.o. Sex: female      Admit Date: 6/2/2022    LOS: 3 days     Subjective:   Patient seen at bedside. Alert and awake, no acute distress. Confused, No swelling in lower extremities. Creat down to 0.7  No new c/o today        Current Facility-Administered Medications   Medication Dose Route Frequency    [START ON 6/6/2022] aspirin chewable tablet 81 mg  81 mg Oral DAILY    meclizine (ANTIVERT) tablet 25 mg  25 mg Oral Q6H PRN    anastrozole (ARIMIDEX) tablet 1 mg  1 mg Oral DAILY    atorvastatin (LIPITOR) tablet 40 mg  40 mg Oral QHS    trospium (SANCTURA) tablet 20 mg  20 mg Oral BID    levothyroxine (SYNTHROID) tablet 50 mcg  50 mcg Oral ACB    famotidine (PEPCID) tablet 20 mg  20 mg Oral DAILY    [Held by provider] propranoloL (INDERAL) tablet 10 mg  10 mg Oral BID    sodium chloride (NS) flush 5-40 mL  5-40 mL IntraVENous Q8H    sodium chloride (NS) flush 5-40 mL  5-40 mL IntraVENous PRN    acetaminophen (TYLENOL) tablet 650 mg  650 mg Oral Q6H PRN    Or    acetaminophen (TYLENOL) suppository 650 mg  650 mg Rectal Q6H PRN    polyethylene glycol (MIRALAX) packet 17 g  17 g Oral DAILY PRN    ondansetron (ZOFRAN ODT) tablet 4 mg  4 mg Oral Q8H PRN    Or    ondansetron (ZOFRAN) injection 4 mg  4 mg IntraVENous Q6H PRN    enoxaparin (LOVENOX) injection 40 mg  40 mg SubCUTAneous DAILY    cefTRIAXone (ROCEPHIN) 1 g in sterile water (preservative free) 10 mL IV syringe  1 g IntraVENous Q24H        Vitals:    06/04/22 0743 06/04/22 1417 06/04/22 2033 06/05/22 0754   BP: 122/67 116/77 121/72 138/78   Pulse: 80 74 71 78   Resp: 16 18 20 18   Temp: 97.8 °F (36.6 °C) 97.8 °F (36.6 °C) 98.3 °F (36.8 °C) 98.4 °F (36.9 °C)   SpO2: 97% 98% 98% 95%   Weight:       Height:         Objective:   General: alert awake , no acute distress.   HEENT: EOMI, no Icterus, no Pallor, pupils reactive, mucosa dry,   Neck: Neck is supple, No JVD,   Lungs: breathsounds normal,  no rhonchi, no rales,  CVS: heart sounds normal,  no murmurs, no rubs. GI: soft, nontender, normal BS, no palpable organomegaly  Extremeties: no cyanosis, no edema,  Neuro: Alert, awake, hard of hearing but understanding conversation, following commands , confusion+  skin: normal skin turgor, no skin rashes. Intake and Output:  Current Shift: No intake/output data recorded. Last three shifts: 06/03 1901 - 06/05 0700  In: -   Out: 0 [Urine:650]      Lab/Data Review:  Recent Labs     06/05/22  0824 06/04/22  0804 06/03/22  0838   WBC 5.4 6.2 7.9   HGB 12.0 11.3* 11.3*   HCT 34.7* 32.8* 32.9*    203 211     Recent Labs     06/05/22  0824 06/04/22  0804 06/03/22  0838    143 140  141   K 3.9 3.6 3.7  3.6   * 114* 115*  114*   CO2 25 22 20*  19*   GLU 88 84 90  92   BUN 5* 7 10  9   CREA 0.74 0.65 0.66  0.80   CA 8.5 8.5 8.4*  8.2*   PHOS 2.8  --  2.3*   ALB 2.8*  --  2.8*     No results for input(s): PH, PCO2, PO2, HCO3, FIO2 in the last 72 hours. Recent Results (from the past 24 hour(s))   CBC WITH AUTOMATED DIFF    Collection Time: 06/05/22  8:24 AM   Result Value Ref Range    WBC 5.4 3.6 - 11.0 K/uL    RBC 3.78 (L) 3.80 - 5.20 M/uL    HGB 12.0 11.5 - 16.0 g/dL    HCT 34.7 (L) 35.0 - 47.0 %    MCV 91.8 80.0 - 99.0 FL    MCH 31.7 26.0 - 34.0 PG    MCHC 34.6 30.0 - 36.5 g/dL    RDW 13.2 11.5 - 14.5 %    PLATELET 179 651 - 369 K/uL    MPV 9.2 8.9 - 12.9 FL    NRBC 0.0 0.0  WBC    ABSOLUTE NRBC 0.00 0.00 - 0.01 K/uL    NEUTROPHILS 57 32 - 75 %    LYMPHOCYTES 31 12 - 49 %    MONOCYTES 8 5 - 13 %    EOSINOPHILS 3 0 - 7 %    BASOPHILS 1 0 - 1 %    IMMATURE GRANULOCYTES 0 0 - 0.5 %    ABS. NEUTROPHILS 3.1 1.8 - 8.0 K/UL    ABS. LYMPHOCYTES 1.7 0.8 - 3.5 K/UL    ABS. MONOCYTES 0.4 0.0 - 1.0 K/UL    ABS. EOSINOPHILS 0.2 0.0 - 0.4 K/UL    ABS. BASOPHILS 0.1 0.0 - 0.1 K/UL    ABS. IMM.  GRANS. 0.0 0.00 - 0.04 K/UL    DF AUTOMATED     RENAL FUNCTION PANEL    Collection Time: 06/05/22  8:24 AM   Result Value Ref Range    Sodium 141 136 - 145 mmol/L    Potassium 3.9 3.5 - 5.1 mmol/L    Chloride 111 (H) 97 - 108 mmol/L    CO2 25 21 - 32 mmol/L    Anion gap 5 5 - 15 mmol/L    Glucose 88 65 - 100 mg/dL    BUN 5 (L) 6 - 20 mg/dL    Creatinine 0.74 0.55 - 1.02 mg/dL    BUN/Creatinine ratio 7 (L) 12 - 20      GFR est AA >60 >60 ml/min/1.73m2    GFR est non-AA >60 >60 ml/min/1.73m2    Calcium 8.5 8.5 - 10.1 mg/dL    Phosphorus 2.8 2.6 - 4.7 mg/dL    Albumin 2.8 (L) 3.5 - 5.0 g/dL        Assessment and Plan:     1. Acute Kidney Injury : probably prerenal azotemia secondary to borderline hypotension/?  Dehydration   Improved renal functions with IV hydration  Improved CPK levels  off IV fluids  Continue to monitor renal functions    2. Rhabdomyolysis: Initial CPK level 1325, down to 384   Probably related to trauma with fall  No need for further follow up of cpks     3. Hypertension: Patient has borderline hypotension, might have contributed to her fall  Improved hemodynamic status  continue to monitor blood pressures     4. History of breast cancer, on chemotherapy following with oncology     5. Hypothyroidism: On levothyroxine, normal TSH level    6.  Hypophos:  Improved with supplements complete po neutrophos for 6 doses    Will sign off please call with any new issues    Signed By: Michele Ortiz MD     June 5, 2022

## 2022-06-06 VITALS
DIASTOLIC BLOOD PRESSURE: 92 MMHG | HEIGHT: 62 IN | OXYGEN SATURATION: 96 % | SYSTOLIC BLOOD PRESSURE: 136 MMHG | TEMPERATURE: 98 F | HEART RATE: 109 BPM | BODY MASS INDEX: 22.08 KG/M2 | RESPIRATION RATE: 20 BRPM | WEIGHT: 120 LBS

## 2022-06-06 PROCEDURE — 97530 THERAPEUTIC ACTIVITIES: CPT

## 2022-06-06 PROCEDURE — 74011000250 HC RX REV CODE- 250: Performed by: INTERNAL MEDICINE

## 2022-06-06 PROCEDURE — 74011250636 HC RX REV CODE- 250/636: Performed by: INTERNAL MEDICINE

## 2022-06-06 PROCEDURE — 74011250637 HC RX REV CODE- 250/637: Performed by: INTERNAL MEDICINE

## 2022-06-06 PROCEDURE — 74011250637 HC RX REV CODE- 250/637: Performed by: NURSE PRACTITIONER

## 2022-06-06 RX ORDER — TROSPIUM CHLORIDE 20 MG/1
20 TABLET, FILM COATED ORAL 2 TIMES DAILY
Qty: 60 TABLET | Refills: 0 | Status: SHIPPED
Start: 2022-06-06 | End: 2022-07-06

## 2022-06-06 RX ORDER — MECLIZINE HYDROCHLORIDE 25 MG/1
25 TABLET ORAL
Qty: 40 TABLET | Refills: 0 | Status: SHIPPED | OUTPATIENT
Start: 2022-06-06 | End: 2022-06-16

## 2022-06-06 RX ORDER — GUAIFENESIN 100 MG/5ML
81 LIQUID (ML) ORAL DAILY
Qty: 30 TABLET | Refills: 0 | Status: SHIPPED | OUTPATIENT
Start: 2022-06-06 | End: 2022-07-06

## 2022-06-06 RX ADMIN — ASPIRIN 81 MG CHEWABLE TABLET 81 MG: 81 TABLET CHEWABLE at 10:09

## 2022-06-06 RX ADMIN — ANASTROZOLE 1 MG: 1 TABLET, COATED ORAL at 10:09

## 2022-06-06 RX ADMIN — SODIUM CHLORIDE, PRESERVATIVE FREE 10 ML: 5 INJECTION INTRAVENOUS at 16:05

## 2022-06-06 RX ADMIN — FAMOTIDINE 20 MG: 20 TABLET ORAL at 10:09

## 2022-06-06 RX ADMIN — WATER 1 G: 1 INJECTION INTRAMUSCULAR; INTRAVENOUS; SUBCUTANEOUS at 05:47

## 2022-06-06 RX ADMIN — LEVOTHYROXINE SODIUM 50 MCG: 0.03 TABLET ORAL at 10:09

## 2022-06-06 RX ADMIN — ENOXAPARIN SODIUM 40 MG: 100 INJECTION SUBCUTANEOUS at 10:09

## 2022-06-06 RX ADMIN — SODIUM CHLORIDE, PRESERVATIVE FREE 10 ML: 5 INJECTION INTRAVENOUS at 05:47

## 2022-06-06 RX ADMIN — TROSPIUM CHLORIDE 20 MG: 20 TABLET, FILM COATED ORAL at 10:29

## 2022-06-06 NOTE — DISCHARGE SUMMARY
Admit date: 6/2/2022   Admitting Provider: Conchita Rios MD    Discharge date: 6/6/2022  Discharging Provider: Villa Chisholm NP      * Admission Diagnoses: UTI (urinary tract infection) [N39.0]    * Discharge Diagnoses:    Hospital Problems as of 6/6/2022 Never Reviewed          Codes Class Noted - Resolved POA    Complicated UTI (urinary tract infection) ICD-10-CM: N39.0  ICD-9-CM: 599.0  5/13/2021 - Present Yes        Rhabdomyolysis ICD-10-CM: M62.82  ICD-9-CM: 728.88  5/13/2021 - Present Yes        Falls frequently ICD-10-CM: R29.6  ICD-9-CM: V15.88  5/13/2021 - Present Yes        * (Principal) UTI (urinary tract infection) ICD-10-CM: N39.0  ICD-9-CM: 599.0  5/10/2021 - Present Unknown              * Hospital Course: Lorenza Chimera a 77 y. o. female with PMH of breast cancer, hypothyroidism, essential tremor, and HLP. Limited history obtained as patient is hard of hearing and mildly demented. Per ED note, Patient reports that she fell earlier today and could not get up. Elba Peraza is walking minimal distances using a walker.  She lives by herself with home assistance. Associated with dizziness and generalized weakness. Denies any preceding symptoms of dizziness, chest pain, shortness of breath. Was recently diagnosed with breast cancer but missed her chemo last week. Oncologist in [facility]. In the ED, noted to have leukocytosis, DEWEY and UTI. 6/2/22 doing well with no new complaints  Rhabdomyolysis: CK remains markedly elevated, management with intravenous fluids. Urinary infection: ceftriaxone for now pending urine culture data. 6/3/22 reports continued right shoulder pains, XR shows degenerative changes no fx. UA shows nitrites, ly est., bacteria, hyaline cast. UCX grew klebsiella, bcx neg. Old remote CVA noted on head CT, no further neuro workup. Epley procedure performed at bedside for vertigo, tolerated well. If further vertigo persists ENT eval warranted. Continue asa and statin. PT/OT reccs SNF.  CM for dispo planning for SNF. 1. UTI  UA shows nitrites, ly est., bacteria, hyaline cast  UCX grew klebsiella, bcx neg   Continue IV antbx therapy     2. Vertigo  Suspect BPPV  Epley procedure performed at bedside  Continue meclizine  If persists ENT eval warranted    3. Rhabdomyolysis secondary to fall  Improved s/p IV hydration  Continue to monitor     3. Acute kidney injury  Secondary to above  Improved s/p IV hydration     4. Hypertension  BP currently at goal  Continue propanolol     5. Left Breast cancer on chemo  S/p lumpectomy  \"Currently on HER2 directed therapy with Herceptin and pertuzumab alone\"  Oncology following      6. Hypothyroidism  Managed with synthroid 50mcg     7. Remote CVA  Noted on head CT  Neuro evaluated no further workup  PT/OT reccs SNF    * Procedures:   * No surgery found *      Consults: Nephrology and Neurology    Significant Diagnostic Studies:   No results found for this or any previous visit (from the past 24 hour(s)). Results from [place] encounter on 06/02/22     CT HEAD WO CONT     Narrative  Exam: CT Head without contrast     TECHNIQUE: Multiple transaxial CT images of the head were obtained without  contrast. Coronal and sagittal reformatted images were provided.     Dose reduction: All CT scans at this facility are performed using dose reduction  optimization techniques as appropriate to a performed exam including the  following: Automated exposure control, adjustments of the mA and/or kV according  to patient size, or use of iterative reconstruction technique.     HISTORY: fall     COMPARISON: CT head and neck angiogram 5/9/2021     FINDINGS:     Global parenchymal volume loss with associated proportional ex vacuo dilatation  of the ventricles and other extra-axial CSF spaces. Deep white matter  hypoattenuation, nonspecific, but most commonly the result of chronic  microangiopathy.  There is likely a remote lacunar infarct within the right  subinsular region, similar to prior. No findings of acute large vessel territory  infarct. No findings of acute intracranial hemorrhage. Basilar cisterns appear  preserved.     Mastoid air cells appear clear. There is mild mucosal thickening of the ethmoid  sinuses. Postoperative appearance of the globes with bilateral lens replacement. Calvarium appears intact without findings of acute or aggressive abnormality.     Impression  1. No findings of acute intracranial abnormality. 2. Nonspecific white matter findings that are most commonly the result of  chronic microangiopathy. 3. 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 Discharge Exam:  Vitals and nursing note reviewed. Constitutional:       Appearance: Normal appearance. Eyes:      Extraocular Movements: Extraocular movements intact. Cardiovascular:      Rate and Rhythm: Normal rate. Pulmonary:      Breath sounds: Normal breath sounds. Abdominal:      General: Bowel sounds are normal.      Palpations: Abdomen is soft. Skin:     General: Skin is warm and dry. Capillary Refill: Capillary refill takes less than 2 seconds. Neurological:      Mental Status: She is alert. She is disoriented. * Discharge Condition: stable  * Disposition: [facility])    Discharge Medications:    Current Discharge Medication List      START taking these medications    Details   aspirin 81 mg chewable tablet Take 1 Tablet by mouth daily for 30 days. Qty: 30 Tablet, Refills: 0  Start date: 6/6/2022, End date: 7/6/2022      trospium (SANCTURA) 20 mg tablet Take 1 Tablet by mouth two (2) times a day for 30 days. Qty: 60 Tablet, Refills: 0  Start date: 6/6/2022, End date: 7/6/2022      meclizine (ANTIVERT) 25 mg tablet Take 1 Tablet by mouth every six (6) hours as needed for Dizziness for up to 10 days.   Qty: 40 Tablet, Refills: 0  Start date: 6/6/2022, End date: 6/16/2022         CONTINUE these medications which have NOT CHANGED    Details famotidine (PEPCID) 20 mg tablet Take 20 mg by mouth daily. anastrozole (ARIMIDEX) 1 mg tablet Take 1 mg by mouth daily. levothyroxine (SYNTHROID) 50 mcg tablet Take 1 Tab by mouth daily. Qty: 30 Tab, Refills: 1      atorvastatin (LIPITOR) 40 mg tablet Take 40 mg by mouth nightly. STOP taking these medications       tolterodine (DETROL) 2 mg tablet Comments:   Reason for Stopping:         propranoloL (INDERAL) 10 mg tablet Comments:   Reason for Stopping:         Detrol LA 2 mg ER capsule Comments:   Reason for Stopping:                * Follow-up Care/Patient Instructions:   Activity: Activity as tolerated and PT/OT Eval and Treat  Diet: Regular Diet  Wound Care: None needed      Follow-up Information     Follow up With Specialties Details Why Contact Info    Nicholas Johnson MD Internal Medicine Physician In 1 week  [facility]      Demetrius Iniguez MD Neurology In 2 months  [address] [address]  [phone]      Renea Odom MD Otolaryngology Schedule an appointment as soon as possible for a visit If vertigo persists [address] [address]  [phone]             Time Spent: > 35 minutes    CC: Demetrius Iniguez MD    Signed:  Keara Vargas NP  6/6/2022  9:04 AM

## 2022-06-06 NOTE — CONSULTS
NEUROLOGY CONSULT    Name Yomi Candelario Age 77 y.o. MRN [id number]  1945     Referring Physician: Hola Rahman MD    Chief Complaint: Remote infarct on CT scan of the head     This is a 77 y.o.  female extremely hard of hearing who was admitted on 22 after a fall and had a CT scan of the head which showed an old lacunar infarct in the right subinsular cortex and the primary team requested a consult on 2022 about this. It is very difficult to get any significant history from the patient but she denied any stroke to her knowledge. She does have these uncontrolled movements of the extremities much more pronounced on the right side than the left which she could not tell that time and he has been having those. Assessment and Plan:  1. Old lacunar infarct in the right subinsular cortex: This is an incidental finding and does not need any additional neurological work-up. The patient is on aspirin 81 g. She has no focal neurological deficits.      I will sign off the case for now, however, please do not hesitate to call if needed again    Thank you for the consult    Allergies   Allergen Reactions    Codeine Unknown (comments)     Current Facility-Administered Medications   Medication Dose Route Frequency    aspirin chewable tablet 81 mg  81 mg Oral DAILY    meclizine (ANTIVERT) tablet 25 mg  25 mg Oral Q6H PRN    anastrozole (ARIMIDEX) tablet 1 mg  1 mg Oral DAILY    atorvastatin (LIPITOR) tablet 40 mg  40 mg Oral QHS    trospium (SANCTURA) tablet 20 mg  20 mg Oral BID    levothyroxine (SYNTHROID) tablet 50 mcg  50 mcg Oral ACB    famotidine (PEPCID) tablet 20 mg  20 mg Oral DAILY    [Held by provider] propranoloL (INDERAL) tablet 10 mg  10 mg Oral BID    sodium chloride (NS) flush 5-40 mL  5-40 mL IntraVENous Q8H    sodium chloride (NS) flush 5-40 mL  5-40 mL IntraVENous PRN    acetaminophen (TYLENOL) tablet 650 mg  650 mg Oral Q6H PRN    Or    acetaminophen (TYLENOL) suppository 650 mg  650 mg Rectal Q6H PRN    polyethylene glycol (MIRALAX) packet 17 g  17 g Oral DAILY PRN    ondansetron (ZOFRAN ODT) tablet 4 mg  4 mg Oral Q8H PRN    Or    ondansetron (ZOFRAN) injection 4 mg  4 mg IntraVENous Q6H PRN    enoxaparin (LOVENOX) injection 40 mg  40 mg SubCUTAneous DAILY     Past Medical History:   Diagnosis Date    Essential tremor     Hypercholesterolemia     Hypothyroidism     Urinary incontinence      Social History     Tobacco Use    Smoking status: Never Smoker    Smokeless tobacco: Never Used   Substance Use Topics    Alcohol use: Not Currently    Drug use: Not Currently     Exam  Visit Vitals  /72 (BP 1 Location: Right upper arm, BP Patient Position: Reclining)   Pulse 70   Temp 98 °F (36.7 °C)   Resp 20   Ht 5' 2\" (1.575 m)   Wt 54.4 kg (120 lb)   SpO2 99%   Breastfeeding No   BMI 21.95 kg/m²     General: Well developed, well nourished. Patient in no distress   Head: Normocephalic, atraumatic, anicteric sclera   Neck Normal ROM, No thyromegally   Lungs:  Clear to auscultation    Cardiac: Regular rate and rhythm with no murmurs. Abd: Bowel sounds were audible   Ext: No pedal edema   Skin: Supple no rash     NeurologicExam:  Mental Status: Alert, awake and extremely hard of hearing   Speech: Fluent no aphasia or dysarthria. Cranial Nerves:   Pupils are equal round and reactive to light and accommodation, extraocular movements are intact and full, visual fields are intact by confrontation, no nystagmus noted, face is symmetric, sensation in face is intact and symmetric, hearing is intact and symmetric, tongue and uvula are in midline with normal movements, palate is elevating equally, shoulder shrug is intact and symmetric. Motor:  Full and symmetric strength of upper and lower ext. Normal bulk and tone. Reflexes:   Deep tendon reflexes 2/4 and symmetric. Sensory:   Symmetric and intact    Gait:  Gait is deferred   Tremor:   No tremor noted. Cerebellar:  Coordination: Significant dysmetria of both upper extremities right more than the left noted. Neurovascular: No carotid bruits.  No JVD   Lab Review  Lab Results   Component Value Date/Time    WBC 5.4 06/05/2022 08:24 AM    HCT 34.7 (L) 06/05/2022 08:24 AM    HGB 12.0 06/05/2022 08:24 AM    PLATELET 008 65/25/7119 08:24 AM     Lab Results   Component Value Date/Time    Sodium 141 06/05/2022 08:24 AM    Potassium 3.9 06/05/2022 08:24 AM    Chloride 111 (H) 06/05/2022 08:24 AM    CO2 25 06/05/2022 08:24 AM    Glucose 88 06/05/2022 08:24 AM    BUN 5 (L) 06/05/2022 08:24 AM    Creatinine 0.74 06/05/2022 08:24 AM    Calcium 8.5 06/05/2022 08:24 AM     No results found for: B12LT, FOL, RBCF  Lab Results   Component Value Date/Time    LDL, calculated 44.6 05/10/2021 11:48 AM     No results found for: HBA1C, OYO9XXRN, VIA3ATQT, MYZ9FSIZ  No components found for: TROPQUANT  No results found for: BONNIE

## 2022-06-06 NOTE — PROGRESS NOTES
Reviewed plan for the day with pt. Pt states she has not been out of the bed since she has been here. Pt was reminded that PT/OT has been trying to work with her but because of the dizziness, she has not been able to get completely out of the bed. She stated she has not seen anyone except for the doctor. Pt insists that she has not been moved in the bed. She does not remember anyone working with her. Pt assured that she has had therapy and that is why she is going to have SNF placement to work on additional strengthening. Call light in reach.

## 2022-06-06 NOTE — PROGRESS NOTES
DC Plan: [facility]       Room# 073D       Report: [phone]       Transportation: 4pm via stretcher with [place] [place] met with pt and pt's BRENTA - Ck Queen at the bedside. [facility] and [facility] accepted. Pt chose to go to [facility]. Transportation was discussed. Cm to arrange stretcher transportation. Attending spoke with POA via phone and provided her with an update. Medicare pt has received, reviewed, and signed 2nd IM letter informing them of their right to appeal the discharge. Signed copied has been placed on pt bedside chart. Cm called Mrs. Kj Wu and provided her with ETA for transportation. Discharge plan of care/case management plan validated with provider's discharge order.

## 2022-06-06 NOTE — PROGRESS NOTES
OCCUPATIONAL THERAPY TREATMENT  Patient: Isma Aguayo (20 y.o. female)  Date: 6/6/2022  Diagnosis: UTI (urinary tract infection) [N39.0] UTI (urinary tract infection)       Precautions: fall risk    Chart, occupational therapy assessment, plan of care, and goals were reviewed. ASSESSMENT  Patient continues with skilled OT services and is progressing towards goals. Pt received semi-supine in bed upon arrival and agreeable to OT tx at this time. Pt currently requires SBA with additional time for rolling, min A for trunk elevation with supine>sit; fair static sitting balance noted with UE support on bed rails and max A to scoot to EOB. Pt completed basic grooming (washing face) s/p setup and combing hair (max A) 2/2 limited overhead reach and poor dynamic sitting balance requiring continued UE support on bed rail. Attempted sit><stand with gt belt and RW, however pt's feet noted to slide and unable to be placed flat on ground 2/2 to height of bed (bed lowered all the way down) therefore deferred on this date due to safety of pt/therapist. Would benefit from x2 assist for safety with OOB transfers next session. Pt scooted to [facility] max A and returned to supine CGA with additional time. Chux pad noted to be wet upon rolling (clean chux pad provided) and pt total A for vanessa care in side lying. Pt left resting comfortably in semi-supine with call bell/needs in reach at end of session. Overall, pt continues to present with deficits in generalized strength/AROM, coordination (UE shakiness noted R>L), bed mobility, static/dynamic sitting balance and functional activity tolerance during performance of ADLs/mobility; c/o dizziness with quick change in position during session today, however is making steady progress towards noted OT goals. Will continue to progress. Recommend d/c to SNF once medically appropriate.      Other factors to consider for discharge: family support, DME, time since onset, severity of deficits PLAN :  Patient continues to benefit from skilled intervention to address the above impairments. Continue treatment per established plan of care. to address goals. Recommend with staff: Encourage participation bed level ADLs as tolerated    Recommend next OT session: LB dressing     Recommendation for discharge: (in order for the patient to meet his/her long term goals)  Buck Gomez    This discharge recommendation:  Has been made in collaboration with the attending provider and/or case management       SUBJECTIVE:   Patient stated I get dizzy if I move too fast.    OBJECTIVE DATA SUMMARY:   Cognitive/Behavioral Status:  Neurologic State: Alert  Orientation Level: Appropriate for age;Oriented X4  Cognition: Follows commands             Functional Mobility and Transfers for ADLs:  Bed Mobility:  Rolling: Stand-by assistance  Supine to Sit: Minimum assistance; Additional time  Sit to Supine: Contact guard assistance; Additional time  Scooting: Maximum assistance    Balance:  Sitting: Impaired; With support  Sitting - Static: Fair (occasional)  Sitting - Dynamic: Poor (constant support)    ADL Intervention:  Feeding  Food to Mouth: Modified independent    Grooming  Grooming Assistance: Moderate assistance  Washing Face: Set-up; Stand-by assistance  Brushing/Combing Hair: Maximum assistance                        Toileting  Toileting Assistance: Total assistance(dependent)  Bowel Hygiene: Total assistance (dependent)         Therapeutic Exercises:   Pt would benefit from UE HEP to improve overall UE AROM/strength and can be further educated in next treatment session.     Pain:  0/10    Activity Tolerance:   Fair and requires rest breaks    After treatment patient left in no apparent distress:   Supine in bed, Call bell within reach, Bed / chair alarm activated and Side rails x 3    COMMUNICATION/COLLABORATION:   The patients plan of care was discussed with: Physical therapy assistant and Registered nurse. Hafsa Dominguez  Time Calculation: 35 mins    Problem: Self Care Deficits Care Plan (Adult)  Goal: *Acute Goals and Plan of Care (Insert Text)  Description: Pt stated goal \"I want to get up\"  Pt will be MI sup<->sit in prep for EOB ADL's  Pt will be MI  LB dressing EOB level  Pt will be MI  sit EOB 10 minutes in prep for EOB ADL's  Pt will be MI  grooming EOB level  Pt will be MI  sit<-> prep for toilet transfer  Pt will be MI  BSC progress to standard toilet transfer with LRAD  Pt will be MI  toileting/cloth mgmt LRAD  Pt will be MI  grooming standing sink  Pt will be MI bathing sitting/standing sink LRAD  Pt will be MI wilber UE HEP in prep for self care tasks    Outcome: Progressing Towards Goal

## 2022-06-06 NOTE — PROGRESS NOTES
Pt report called to [address] RN at [facility].  Pt going to room 407W. [phone]

## 2022-06-06 NOTE — DISCHARGE INSTRUCTIONS
* Follow-up Care/Patient Instructions:   Activity: Activity as tolerated and PT/OT Eval and Treat  Diet: Regular Diet  Wound Care: None needed    Follow-up Information     Follow up With Specialties Details Why Contact Info    Jose Manuel Lyons MD Internal Medicine Physician In 1 week  [facility]      Myriam Redman MD Neurology In 2 months  [address]  [facility]  [address]  [phone]

## 2022-06-06 NOTE — PROGRESS NOTES
Reviewed discharge order. Patient cleared to discharge. Printed discharge summary. Removed telemetry and all iv accesses. Reviewed contents of the discharge summary too include medication review and follow up appointments. Discharge plan of care/case management plan validated with provider discharge order.

## 2022-06-06 NOTE — PROGRESS NOTES
Renal Progress Note    Patient: Tamala Goldmann MRN: [id number]  SSN: [id number]    YOB: 1945  Age: 77 y.o. Sex: female      Admit Date: 6/2/2022    LOS: 4 days     Subjective:   Patient seen at bedside. Alert and awake, no acute distress. Confused, No swelling in lower extremities. Creat down to 0.7  No new c/o today  Being discharged home today        Current Facility-Administered Medications   Medication Dose Route Frequency    aspirin chewable tablet 81 mg  81 mg Oral DAILY    meclizine (ANTIVERT) tablet 25 mg  25 mg Oral Q6H PRN    anastrozole (ARIMIDEX) tablet 1 mg  1 mg Oral DAILY    atorvastatin (LIPITOR) tablet 40 mg  40 mg Oral QHS    trospium (SANCTURA) tablet 20 mg  20 mg Oral BID    levothyroxine (SYNTHROID) tablet 50 mcg  50 mcg Oral ACB    famotidine (PEPCID) tablet 20 mg  20 mg Oral DAILY    [Held by provider] propranoloL (INDERAL) tablet 10 mg  10 mg Oral BID    sodium chloride (NS) flush 5-40 mL  5-40 mL IntraVENous Q8H    sodium chloride (NS) flush 5-40 mL  5-40 mL IntraVENous PRN    acetaminophen (TYLENOL) tablet 650 mg  650 mg Oral Q6H PRN    Or    acetaminophen (TYLENOL) suppository 650 mg  650 mg Rectal Q6H PRN    polyethylene glycol (MIRALAX) packet 17 g  17 g Oral DAILY PRN    ondansetron (ZOFRAN ODT) tablet 4 mg  4 mg Oral Q8H PRN    Or    ondansetron (ZOFRAN) injection 4 mg  4 mg IntraVENous Q6H PRN    enoxaparin (LOVENOX) injection 40 mg  40 mg SubCUTAneous DAILY        Vitals:    06/04/22 2033 06/05/22 0754 06/05/22 2101 06/06/22 0736   BP: 121/72 138/78 121/78 118/72   Pulse: 71 78 72 70   Resp: 20 18 18 20   Temp: 98.3 °F (36.8 °C) 98.4 °F (36.9 °C) 98.1 °F (36.7 °C) 98 °F (36.7 °C)   SpO2: 98% 95% 98% 99%   Weight:       Height:         Objective:   General: alert awake , no acute distress.   HEENT: EOMI, no Icterus, no Pallor, pupils reactive, mucosa dry,   Neck: Neck is supple, No JVD,   Lungs: breathsounds normal,  no rhonchi, no rales,  CVS: heart sounds normal,  no murmurs, no rubs. GI: soft, nontender, normal BS, no palpable organomegaly  Extremeties: no cyanosis, no edema,  Neuro: Alert, awake, hard of hearing but understanding conversation, following commands , confusion+  skin: normal skin turgor, no skin rashes. Intake and Output:  Current Shift: No intake/output data recorded. Last three shifts: 06/04 1901 - 06/06 0700  In: -   Out: 425 [Urine:425]      Lab/Data Review:  Recent Labs     06/05/22 0824 06/04/22  0804   WBC 5.4 6.2   HGB 12.0 11.3*   HCT 34.7* 32.8*    203     Recent Labs     06/05/22 0824 06/04/22  0804    143   K 3.9 3.6   * 114*   CO2 25 22   GLU 88 84   BUN 5* 7   CREA 0.74 0.65   CA 8.5 8.5   PHOS 2.8  --    ALB 2.8*  --      No results for input(s): PH, PCO2, PO2, HCO3, FIO2 in the last 72 hours. No results found for this or any previous visit (from the past 24 hour(s)). Assessment and Plan:     1. Acute Kidney Injury : probably prerenal azotemia secondary to borderline hypotension/?  Dehydration   Improved renal functions with IV hydration  Improved CPK levels  off IV fluids  Continue to monitor renal functions    2. Rhabdomyolysis: Initial CPK level 1325, down to 384   Probably related to trauma with fall  No need for further follow up of cpks     3. Hypertension: Patient has borderline hypotension, might have contributed to her fall  Improved hemodynamic status  continue to monitor blood pressures     4. History of breast cancer, on chemotherapy following with oncology     5. Hypothyroidism: On levothyroxine, normal TSH level    6.  Hypophos:  Improved with supplements complete po neutrophos for 6 doses  Phosphorus today is 2.8    Will sign off please call with any new issues    Signed By: Gifty Tim MD     June 6, 2022

## 2022-06-06 NOTE — PROGRESS NOTES
PHYSICAL THERAPY TREATMENT  Patient: Donnell Sweet (65 y.o. female)  Date: 6/6/2022  Diagnosis: UTI (urinary tract infection) [N39.0] UTI (urinary tract infection)       Precautions:    Chart, physical therapy assessment, plan of care and goals were reviewed. ASSESSMENT  Patient continues with skilled PT services and is progressing towards goals. Patient supine in bed upon approach and agreed to therapy session today. Patient was SBA for bed mobility, min A for supine<>sit and max A for scooting to EOB. Patient hesitant about standing and requiring VC  encouragement to perform standing. OT entered room to assist with STS. Patient was max Ax2 for STS to RW. Patient required max Ax2 to remain standing for 30 seconds. Patient demonstrated poor standing balance with forward flexion at his and uncontrolled shaking. Due to shaking and forward flexion patient unable to take any steps in any direction. Patient then returned to seated EOB at max Ax2. Patient returned to supine in bed at min A and left supine in bed with call bell within reach and all needs meet. Current Level of Function Impacting Discharge (mobility/balance): impaired balance, uncontrolled shaking, general weakness,    Other factors to consider for discharge: PLOF, assistance at home, live alone, dizziness with positional change. PLAN :  Patient continues to benefit from skilled intervention to address the above impairments. Continue treatment per established plan of care. to address goals. Recommendation for discharge: (in order for the patient to meet his/her long term goals)  Buck Gomez    This discharge recommendation:  Has been made in collaboration with the attending provider and/or case management    IF patient discharges home will need the following DME: gait belt and rolling walker       SUBJECTIVE:   Patient stated i haven't stood since I got here.     OBJECTIVE DATA SUMMARY:   Critical Behavior:  Neurologic State: Alert  Orientation Level: Appropriate for age,Oriented X4  Cognition: Follows commands     Functional Mobility Training:  Bed Mobility:  Rolling: Stand-by assistance  Supine to Sit: Minimum assistance  Sit to Supine: Minimum assistance  Scooting: Maximum assistance  Transfers:  Sit to Stand: Maximum assistance;Assist x2  Stand to Sit: Maximum assistance;Assist x2  Balance:  Sitting: Impaired; With support  Sitting - Static: Fair (occasional)  Sitting - Dynamic: Poor (constant support)  Standing: Impaired; With support  Standing - Static: Constant support;Poor  Pain Rating:  No pain reported but dizziness with positional changes     Activity Tolerance:   Fair  Please refer to the flowsheet for vital signs taken during this treatment. After treatment patient left in no apparent distress:   Supine in bed, Call bell within reach, Bed / chair alarm activated, and Side rails x 3    COMMUNICATION/COLLABORATION:   The patients plan of care was discussed with: Registered nurse.        Problem: Mobility Impaired (Adult and Pediatric)  Goal: *Acute Goals and Plan of Care (Insert Text)  Description: I with LE HEP x7 days  SBA with supine to sit EOB x 7 days  CGA with sit to stand x 7 days  CGA with stand pivot from bed to chair x 7 days  Progress to amb 25-50ft with RW and CGAx1 x7 days  Outcome: Progressing Towards Goal       Almaz Young PTA   Time Calculation: 19 mins

## 2022-06-06 NOTE — PROGRESS NOTES
110    Cm attempted to contact pt's [facility] [phone]. Cm left a voice message. Awaiting phone call.      CM messaged attending via [address] [place] [address] to order a rapid COVID test.

## 2022-06-06 NOTE — PROGRESS NOTES
Problem: Falls - Risk of  Goal: *Absence of Falls  Description: Document Rubina  Fall Risk and appropriate interventions in the flowsheet. Outcome: Progressing Towards Goal  Note: Fall Risk Interventions:  Mobility Interventions: Bed/chair exit alarm    Mentation Interventions: Bed/chair exit alarm         Elimination Interventions: Call light in reach    History of Falls Interventions: Bed/chair exit alarm         Problem: Patient Education: Go to Patient Education Activity  Goal: Patient/Family Education  Outcome: Progressing Towards Goal     Problem: Patient Education: Go to Patient Education Activity  Goal: Patient/Family Education  Outcome: Progressing Towards Goal     Problem: Patient Education: Go to Patient Education Activity  Goal: Patient/Family Education  Outcome: Progressing Towards Goal     Problem: Urinary Tract Infection - Adult  Goal: *Absence of infection signs and symptoms  Outcome: Progressing Towards Goal     Problem: Patient Education: Go to Patient Education Activity  Goal: Patient/Family Education  Outcome: Progressing Towards Goal     Problem: Pressure Injury - Risk of  Goal: *Prevention of pressure injury  Description: Document Garth Scale and appropriate interventions in the flowsheet.   Outcome: Progressing Towards Goal  Note: Pressure Injury Interventions:       Moisture Interventions: Minimize layers    Activity Interventions: PT/OT evaluation    Mobility Interventions: Float heels    Nutrition Interventions: Document food/fluid/supplement intake    Friction and Shear Interventions: Apply protective barrier, creams and emollients                Problem: Patient Education: Go to Patient Education Activity  Goal: Patient/Family Education  Outcome: Progressing Towards Goal

## 2022-06-08 LAB
BACTERIA SPEC CULT: NORMAL
SPECIAL REQUESTS,SREQ: NORMAL

## 2022-06-14 NOTE — PROGRESS NOTES
Physician Progress Note      Abdirashid Feng  [facility] #:                  [id number]  :                       1945  ADMIT DATE:       2022 12:31 AM  DISCH DATE:        2022 4:16 PM  RESPONDING  PROVIDER #:        Emperatriz Bland NP [address] [facility] NP          QUERY TEXT:    Patient admitted with UTI, DEWEY. Noted documentation of sepsis in SEPSIS on H&P and PN's. In order to support the diagnosis of sepsis, please include additional clinical indicators in your documentation. Or please document if the diagnosis of sepsis has been ruled out after further study. The medical record reflects the following:  Risk Factors: DEWEY, UTI, Hx left Breast CA, HLD, Dehydration  Clinical Indicators: WB 13.7, HR & O2 sats WNL,  No LAC, Procal or CRP drawn. Negative BCx. H&P: \"Likely dehydration from poor oral intake and sepsis. \" 6/3 PN: \" Sepsis at admission in the setting of urinary infection. \"  Treatment: Neuro & nephron consulted, BCx obtain, IV Abx, Lab monitoring. Thank PAN Khalil, RN, CDI Specialist  [email or web address] or [phone]  Options provided:  -- Sepsis present as evidenced by, Please document evidence. -- Sepsis was ruled out after study  -- Other - I will add my own diagnosis  -- Disagree - Not applicable / Not valid  -- Disagree - Clinically unable to determine / Unknown  -- Refer to Clinical Documentation Reviewer    PROVIDER RESPONSE TEXT:    Sepsis was ruled out after study.     Query created by: Seth Abarca on 2022 3:04 PM      Electronically signed by:  Emperatriz Bland NP [address] [facility] NP 2022 3:13 PM

## 2022-07-04 PROBLEM — N39.0 UTI (URINARY TRACT INFECTION): Status: RESOLVED | Noted: 2021-05-10 | Resolved: 2022-07-04

## 2022-07-19 ENCOUNTER — TRANSCRIBE ORDER (OUTPATIENT)
Dept: SCHEDULING | Age: 77
End: 2022-07-19

## 2022-07-19 DIAGNOSIS — Z51.11 ENCOUNTER FOR ANTINEOPLASTIC CHEMOTHERAPY: ICD-10-CM

## 2022-07-19 DIAGNOSIS — Z51.89 ENCOUNTER FOR VOCATIONAL THERAPY: ICD-10-CM

## 2022-07-19 DIAGNOSIS — C50.912 MALIGNANT NEOPLASM OF LEFT BREAST (HCC): Primary | ICD-10-CM

## 2022-07-19 DIAGNOSIS — Z01.89 RADIOLOGICAL EXAMINATION, NOT ELSEWHERE CLASSIFIED: ICD-10-CM

## 2023-05-22 RX ORDER — LEVOTHYROXINE SODIUM 0.05 MG/1
50 TABLET ORAL DAILY
COMMUNITY
Start: 2021-05-13

## 2023-05-22 RX ORDER — FAMOTIDINE 20 MG/1
20 TABLET, FILM COATED ORAL DAILY
COMMUNITY
Start: 2022-03-09

## 2023-05-22 RX ORDER — ATORVASTATIN CALCIUM 40 MG/1
40 TABLET, FILM COATED ORAL NIGHTLY
COMMUNITY
Start: 2021-05-03

## 2023-05-22 RX ORDER — ANASTROZOLE 1 MG/1
1 TABLET ORAL DAILY
COMMUNITY
Start: 2022-03-30